# Patient Record
Sex: FEMALE | Race: WHITE | NOT HISPANIC OR LATINO | Employment: FULL TIME | URBAN - METROPOLITAN AREA
[De-identification: names, ages, dates, MRNs, and addresses within clinical notes are randomized per-mention and may not be internally consistent; named-entity substitution may affect disease eponyms.]

---

## 2017-01-05 ENCOUNTER — HOSPITAL ENCOUNTER (INPATIENT)
Facility: CLINIC | Age: 32
LOS: 3 days | Discharge: HOME HEALTH | End: 2017-01-08
Attending: OBSTETRICS & GYNECOLOGY | Admitting: OBSTETRICS & GYNECOLOGY
Payer: COMMERCIAL

## 2017-01-05 ENCOUNTER — TELEPHONE (OUTPATIENT)
Dept: NURSING | Facility: CLINIC | Age: 32
End: 2017-01-05

## 2017-01-05 DIAGNOSIS — Z98.891 S/P PRIMARY LOW TRANSVERSE C-SECTION: Primary | ICD-10-CM

## 2017-01-05 PROBLEM — Z36.89 ENCOUNTER FOR TRIAGE IN PREGNANT PATIENT: Status: ACTIVE | Noted: 2017-01-05

## 2017-01-05 LAB
ERYTHROCYTE [DISTWIDTH] IN BLOOD BY AUTOMATED COUNT: 13.7 % (ref 10–15)
FETAL HGB STAIN: NORMAL
FIBRINOGEN PPP-MCNC: 392 MG/DL (ref 200–420)
HCT VFR BLD AUTO: 37.1 % (ref 35–47)
HGB BLD-MCNC: 12.3 G/DL (ref 11.7–15.7)
HGB F BLD QL KLEIH BETKE: NORMAL
INR PPP: 0.91 (ref 0.86–1.14)
MCH RBC QN AUTO: 30.1 PG (ref 26.5–33)
MCHC RBC AUTO-ENTMCNC: 33.2 G/DL (ref 31.5–36.5)
MCV RBC AUTO: 91 FL (ref 78–100)
PLATELET # BLD AUTO: 126 10E9/L (ref 150–450)
RBC # BLD AUTO: 4.08 10E12/L (ref 3.8–5.2)
WBC # BLD AUTO: 8.5 10E9/L (ref 4–11)

## 2017-01-05 PROCEDURE — 86900 BLOOD TYPING SEROLOGIC ABO: CPT | Performed by: OBSTETRICS & GYNECOLOGY

## 2017-01-05 PROCEDURE — 85460 HEMOGLOBIN FETAL: CPT | Performed by: OBSTETRICS & GYNECOLOGY

## 2017-01-05 PROCEDURE — 86850 RBC ANTIBODY SCREEN: CPT | Performed by: OBSTETRICS & GYNECOLOGY

## 2017-01-05 PROCEDURE — 86923 COMPATIBILITY TEST ELECTRIC: CPT | Performed by: OBSTETRICS & GYNECOLOGY

## 2017-01-05 PROCEDURE — 25000125 ZZHC RX 250: Performed by: OBSTETRICS & GYNECOLOGY

## 2017-01-05 PROCEDURE — 36415 COLL VENOUS BLD VENIPUNCTURE: CPT | Performed by: OBSTETRICS & GYNECOLOGY

## 2017-01-05 PROCEDURE — 85384 FIBRINOGEN ACTIVITY: CPT | Performed by: OBSTETRICS & GYNECOLOGY

## 2017-01-05 PROCEDURE — 86901 BLOOD TYPING SEROLOGIC RH(D): CPT | Performed by: OBSTETRICS & GYNECOLOGY

## 2017-01-05 PROCEDURE — 25800025 ZZH RX 258: Performed by: OBSTETRICS & GYNECOLOGY

## 2017-01-05 PROCEDURE — 85610 PROTHROMBIN TIME: CPT | Performed by: OBSTETRICS & GYNECOLOGY

## 2017-01-05 PROCEDURE — 85027 COMPLETE CBC AUTOMATED: CPT | Performed by: OBSTETRICS & GYNECOLOGY

## 2017-01-05 PROCEDURE — 12000028 ZZH R&B OB UMMC

## 2017-01-05 RX ORDER — NUTRITIONAL TX FOR PKU NO.31 10G-42/13G
2 POWDER (GRAM) ORAL
Status: DISCONTINUED | OUTPATIENT
Start: 2017-01-05 | End: 2017-01-06

## 2017-01-05 RX ORDER — PENICILLIN G POTASSIUM 5000000 [IU]/1
5 INJECTION, POWDER, FOR SOLUTION INTRAMUSCULAR; INTRAVENOUS ONCE
Status: COMPLETED | OUTPATIENT
Start: 2017-01-05 | End: 2017-01-05

## 2017-01-05 RX ORDER — LIDOCAINE 40 MG/G
CREAM TOPICAL
Status: DISCONTINUED | OUTPATIENT
Start: 2017-01-05 | End: 2017-01-06

## 2017-01-05 RX ORDER — OXYTOCIN 10 [USP'U]/ML
10 INJECTION, SOLUTION INTRAMUSCULAR; INTRAVENOUS
Status: DISCONTINUED | OUTPATIENT
Start: 2017-01-05 | End: 2017-01-06

## 2017-01-05 RX ORDER — NALOXONE HYDROCHLORIDE 0.4 MG/ML
.1-.4 INJECTION, SOLUTION INTRAMUSCULAR; INTRAVENOUS; SUBCUTANEOUS
Status: DISCONTINUED | OUTPATIENT
Start: 2017-01-05 | End: 2017-01-06

## 2017-01-05 RX ORDER — IBUPROFEN 800 MG/1
800 TABLET, FILM COATED ORAL
Status: DISCONTINUED | OUTPATIENT
Start: 2017-01-05 | End: 2017-01-06

## 2017-01-05 RX ORDER — METHYLERGONOVINE MALEATE 0.2 MG/ML
200 INJECTION INTRAVENOUS
Status: DISCONTINUED | OUTPATIENT
Start: 2017-01-05 | End: 2017-01-06

## 2017-01-05 RX ORDER — SODIUM CHLORIDE, SODIUM LACTATE, POTASSIUM CHLORIDE, CALCIUM CHLORIDE 600; 310; 30; 20 MG/100ML; MG/100ML; MG/100ML; MG/100ML
INJECTION, SOLUTION INTRAVENOUS CONTINUOUS
Status: DISCONTINUED | OUTPATIENT
Start: 2017-01-05 | End: 2017-01-06

## 2017-01-05 RX ORDER — OXYTOCIN/0.9 % SODIUM CHLORIDE 30/500 ML
100-340 PLASTIC BAG, INJECTION (ML) INTRAVENOUS CONTINUOUS PRN
Status: DISCONTINUED | OUTPATIENT
Start: 2017-01-05 | End: 2017-01-06

## 2017-01-05 RX ORDER — OXYTOCIN/0.9 % SODIUM CHLORIDE 30/500 ML
1-24 PLASTIC BAG, INJECTION (ML) INTRAVENOUS CONTINUOUS
Status: DISCONTINUED | OUTPATIENT
Start: 2017-01-05 | End: 2017-01-06

## 2017-01-05 RX ORDER — FENTANYL CITRATE 50 UG/ML
50-100 INJECTION, SOLUTION INTRAMUSCULAR; INTRAVENOUS
Status: DISCONTINUED | OUTPATIENT
Start: 2017-01-05 | End: 2017-01-06

## 2017-01-05 RX ORDER — CARBOPROST TROMETHAMINE 250 UG/ML
250 INJECTION, SOLUTION INTRAMUSCULAR
Status: DISCONTINUED | OUTPATIENT
Start: 2017-01-05 | End: 2017-01-06

## 2017-01-05 RX ORDER — ACETAMINOPHEN 325 MG/1
650 TABLET ORAL EVERY 4 HOURS PRN
Status: DISCONTINUED | OUTPATIENT
Start: 2017-01-05 | End: 2017-01-06

## 2017-01-05 RX ORDER — OXYCODONE AND ACETAMINOPHEN 5; 325 MG/1; MG/1
1 TABLET ORAL
Status: DISCONTINUED | OUTPATIENT
Start: 2017-01-05 | End: 2017-01-06

## 2017-01-05 RX ORDER — ONDANSETRON 2 MG/ML
4 INJECTION INTRAMUSCULAR; INTRAVENOUS EVERY 6 HOURS PRN
Status: DISCONTINUED | OUTPATIENT
Start: 2017-01-05 | End: 2017-01-06

## 2017-01-05 RX ADMIN — OXYTOCIN-SODIUM CHLORIDE 0.9% IV SOLN 30 UNIT/500ML 2 MILLI-UNITS/MIN: 30-0.9/5 SOLUTION at 11:31

## 2017-01-05 RX ADMIN — PENICILLIN G POTASSIUM 5 MILLION UNITS: 5000000 POWDER, FOR SOLUTION INTRAMUSCULAR; INTRAPLEURAL; INTRATHECAL; INTRAVENOUS at 06:30

## 2017-01-05 RX ADMIN — Medication 2.5 MILLION UNITS: at 10:34

## 2017-01-05 RX ADMIN — Medication 2.5 MILLION UNITS: at 23:31

## 2017-01-05 RX ADMIN — SODIUM CHLORIDE, POTASSIUM CHLORIDE, SODIUM LACTATE AND CALCIUM CHLORIDE: 600; 310; 30; 20 INJECTION, SOLUTION INTRAVENOUS at 22:56

## 2017-01-05 RX ADMIN — Medication 2.5 MILLION UNITS: at 18:34

## 2017-01-05 RX ADMIN — SODIUM CHLORIDE, POTASSIUM CHLORIDE, SODIUM LACTATE AND CALCIUM CHLORIDE: 600; 310; 30; 20 INJECTION, SOLUTION INTRAVENOUS at 13:22

## 2017-01-05 RX ADMIN — Medication 2.5 MILLION UNITS: at 14:31

## 2017-01-05 RX ADMIN — SODIUM CHLORIDE, POTASSIUM CHLORIDE, SODIUM LACTATE AND CALCIUM CHLORIDE: 600; 310; 30; 20 INJECTION, SOLUTION INTRAVENOUS at 06:30

## 2017-01-05 NOTE — H&P
"[facility]  History and Physical    CC: Labor admission    HPI: Laila Cuvea is a 31 year old  who presents at 39w4d by 8w4d the US in a pregnancy complicated by PKU and, possible von Willebrand disease (See below for details).     She is feeling well today. She presents with two day history of vaginal bleeding that has shown up as a 50 cent piece size of blood on a pantyliner. She feels some contractions. Notes thin, watery discharge. Denies bit gush of fluid.    Complications of pregnancy:  1. PKU - levels WNL at 2-4 mg/dL  2. Possible vWD, history of heavy periods, patient with low normal levels in pregnancy, concerning for possible vWD, need to re-check levels at 6-8 weeks pp.  3. Thrombocytopenia - plt of 126, likely gestational thrombocytopenia    US review:  2016: Dating US @8w4d  8/10/2016: Anatomy US @ 18w3d, anterior placenta, normal anatomy, cervix 4.13 cm  2016: F/u for PKU, LUCERO 15, EFW 3834g at 37w3d      Lab review:  Rh +, Antibody Negative, Rubella immune, HIV NR, RPR Neg, ,    OB Hx:  G1: current    Review Of Systems  Eyes: No vision changes  Ears/Nose/Throat: No rhinorrhea, throat soreness  Respiratory: No shortness of breath, dyspnea on exertion, cough, or hemoptysis  Cardiovascular: negative for, chest pain and exertional chest pain or pressure  Gastrointestinal: negative for, nausea, vomiting, constipation and diarrhea  Genitourinary: negative for and dysuria  Musculoskeletal: negative for, back pain and joint pain  Neurologic: negative for headaches  Hematologic/Lymphatic/Immunologic: negative for, anemia and bleeding disorder  Endocrine: negative for, thyroid disorder and diabetes    PMH:  PKU    PSH:   [place] teeth, toenails    Social Hx:  Lives with  sidra    Meds:   [place], PNV    Allergies:  Splenda    Physical Exam:  /88 mmHg  Temp(Src) 98.3  F (36.8  C) (Oral)  Resp 16  Ht 1.609 m (5' 3.35\")  Wt 76 kg (167 lb 8.8 oz)  BMI " 29.36 kg/m2  LMP 04/10/2016 (Approximate)   Constitutional: healthy, alert and no distress  Head: Normocephalic. No masses, lesions, tenderness or abnormalities  Cardiovascular: negative, RRR. No murmurs, clicks gallops or rub  Respiratory: negative. Good diaphragmatic excursion. Lungs clear  Gastrointestinal: Abdomen soft, non-tender. Gravid  : Pelvic - EG: normal female, vulva reveals no erythema or lesions.   BUS: within normal limits.  Vagina: well rugated, no lesions polyps. +Pooling, thin fluid, with brown discharge.  Cervix: no lesions, polyps discharge or CMT.  Musculoskeletal: extremities normal- no gross deformities noted and normal muscle tone  Psychiatric: mentation appears normal and affect normal/bright    Results for orders placed or performed during the hospital encounter of 17   CBC with platelets   Result Value Ref Range    WBC 8.5 4.0 - 11.0 10e9/L    RBC Count 4.08 3.8 - 5.2 10e12/L    Hemoglobin 12.3 11.7 - 15.7 g/dL    Hematocrit 37.1 35.0 - 47.0 %    MCV 91 78 - 100 fl    MCH 30.1 26.5 - 33.0 pg    MCHC 33.2 31.5 - 36.5 g/dL    RDW 13.7 10.0 - 15.0 %    Platelet Count 126 (L) 150 - 450 10e9/L   Fibrinogen activity   Result Value Ref Range    Fibrinogen 392 200 - 420 mg/dL   INR   Result Value Ref Range    INR 0.91 0.86 - 1.14   ABO and Rh   Result Value Ref Range    ABO Pending     RH(D) Pending     Specimen Expires 2017      BSUS: Cephalic, LUCERO 4    FHT: Baseline 145 Bpm, moderat variability, + accels, single, prolonged decel  [place]: ctx q2 min      A/P:  31 year old  at 39w4d here with bleeding and likely PROM.    1. PROM:  - currently valarie too frequently to augment, will monitor  - GBS +, start PCN now  - Cephalic BSUS    2. Frequent contractions, r/u abruption:  - fibrinogen low normal at 329, will watch for KB and continue close monitoring, FHT overall reassuring    3. PKU  - continue Kuvan    4. Thrombocytopenia, if patient is having abruption, could be  consumptive, but otherwise, likely gestational  - cbc PP    5. Possible vWD, avoid operative delivery  - vWD testing at 6-8 weeks pp.    Patient Discussed with Dr. Carver    Anticipated stay >2 days    Beverly Harvey MD 5:14 AM 1/5/2017

## 2017-01-05 NOTE — PLAN OF CARE
Problem: Labor (Cervical Ripen, Induct, Augment) (Adult,Obstetrics,Pediatric)  Goal: Signs and Symptoms of Listed Potential Problems Will be Absent or Manageable (Labor)  Signs and symptoms of listed potential problems will be absent or manageable by discharge/transition of care (reference Labor (Cervical Ripen, Induct, Augment) (Adult,Obstetrics,Pediatric) CPG).  Outcome: Improving  Data: [facility]   Reason for maternal/fetal assessment per patient is Vaginal Bleeding  .  Patient is a . Prenatal record reviewed.      Obstetric History       T0      TAB0   SAB0   E0   M0   L0        # Outcome Date GA Lbr Tarik/2nd Weight Sex Delivery Anes PTL Lv   1 Current                         . Medical history:   Past Medical History   Diagnosis Date     Atypical PKU (H)         since birth, manage with diet      Von Willebrand disease (H)         affects mainly menses     Raynaud phenomenon         affects fingers and toes     Von Willebrand disease (H)     . Gestational Age 39w4d. VSS. Fetal movement present. Patient denies cramping, backache, vaginal discharge, pelvic pressure, UTI symptoms, GI problems, edema, headache, visual disturbances, epigastric or URQ pain, abdominal pain, rupture of membranes. Support persons Tray present.  Action: Verbal consent for EFM. Triage assessment completed. EFM applied for labor eval. Uterine assessment frequent contractions every 2 minutes. Fetal assessment: Presumed adequate fetal oxygenation documented (see flow record).   Response: Dr. Carver informed of arrival and complaints. Plan per provider is admit for low juan/srom. Patient verbalized agreement with plan. Patient transferred to room 481 ambulatory, oriented to room and call light. Begin antibiotics for gbs+.

## 2017-01-05 NOTE — IP AVS SNAPSHOT
[facility]    [address] [address]    Phone:  [phone]                                       After Visit Summary   1/5/2017    Laila Cueva    MRN: [id number]           After Visit Summary Signature Page     I have received my discharge instructions, and my questions have been answered. I have discussed any challenges I see with this plan with the nurse or doctor.    ..........................................................................................................................................  Patient/Patient Representative Signature      ..........................................................................................................................................  Patient Representative Print Name and Relationship to Patient    ..................................................               ................................................  Date                                            Time    ..........................................................................................................................................  Reviewed by Signature/Title    ...................................................              ..............................................  Date                                                            Time

## 2017-01-05 NOTE — IP AVS SNAPSHOT
MRN:[id number]                      After Visit Summary   2017    Laila Cueva    MRN: [id number]           Thank you!     Thank you for choosing [facility] for your care. Our goal is always to provide you with excellent care. Hearing back from our patients is one way we can continue to improve our services. Please take a few minutes to complete the written survey that you may receive in the mail after you visit with us. Thank you!        Patient Information     Date Of Birth          1985        About your hospital stay     You were [facility] on:  [facility]    You were discharged on:  2017       Who to Call     For medical emergencies, please call 911.  For non-urgent questions about your medical care, please call your primary care provider or clinic, [phone]  For questions related to your surgery, please call your surgery clinic        Attending Provider     Provider    Kelly Carver MD Eckhardt, Elizabeth A, MD       Primary Care Provider Office Phone # Fax #    Billie Phillips JEREMY Mathe[facility] [phone]       [address] [address]        Your next 10 appointments already scheduled     Mar 27, 2017 12:30 PM   Return Metabolic Visit with Bishop Monroy MD   Peds Metabolism ([facility])    [facility]  [address] [address]  [address] [place] [id number] [phone]              Further instructions from your care team       Postop  Birth Instructions    Activity       Do not lift more than 10 pounds for 6 weeks after surgery.  Ask family and friends for help when you need it.    No driving until you have stopped taking your pain medications (usually two weeks after surgery).    No heavy exercise or activity for 6 weeks.  Don't do anything that will put a strain on your surgery site.    Don't strain when using the toilet.   Your care team may prescribe a stool softener if you have problems with your bowel movements.     To care for your incision:       Keep the incision clean and dry.    Do not soak your incision in water. No swimming or hot tubs until it has fully healed. You may soak in the bathtub if the water level is below your incision.    Do not use peroxide, gel, cream, lotion, or ointment on your incision.    Adjust your clothes to avoid pressure on your surgery site (check the elastic in your underwear for example).     You may see a small amount of clear or pink drainage and this is normal.  Check with your health care provider:       If the drainage increases or has an odor.    If the incision reddens, you have swelling, or develop a rash.    If you have increased pain and the medicine we prescribed doesn't help.    If you have a fever above 100.4 F (38 C) with or without chills when placing thermometer under your tongue.   The area around your incision (surgery wound), will feel numb.  This is normal. The numbness should go away in less than a year.     Keep your hands clean:  Always wash your hands before touching your incision (surgery wound). This helps reduce your risk of infection. If your hands aren't dirty, you may use an alcohol hand-rub to clean your hands. Keep your nails clean and short.    Call your healthcare provider if you have any of these symptoms:       You soak a sanitary pad with blood within 1 hour, or you see blood clots larger than a golf ball.    Bleeding that lasts more than 6 weeks.    Vaginal discharge that smells bad.    Severe pain, cramping or tenderness in your lower belly area.    A need to urinate more frequently (use the toilet more often), more urgently (use the toilet very quickly), or it burns when you urinate.    Nausea and vomiting.    Redness, swelling or pain around a vein in your leg.    Problems breastfeeding or a red or painful area on your breast.    Chest pain and cough or are  "gasping for air.    Problems with coping with sadness, anxiety or depression. If you have concerns about hurting yourself or the baby, call your provider immediately.      You have questions or concerns after you return home.                  Pending Results     No orders found from 2017 to 2017.            Statement of Approval     Ordered          17 0905  I have reviewed and agree with all the recommendations and orders detailed in this document.   EFFECTIVE NOW     Approved and electronically signed by:  Erika De La Paz MD             Admission Information        Provider Department Dept Phone    2017 JUAN C CABALLERO MD [facility] [phone]      Your Vitals Were     Blood Pressure Pulse Temperature Respirations    135/81 mmHg 75 98.6  F (37  C) (Oral) 16    Height Weight BMI (Body Mass Index) Pulse Oximetry    1.609 m (5' 3.35\") 76 kg (167 lb 8.8 oz) 29.36 kg/m2 100%    Last Period             04/10/2016 (Approximate)         MyChart Information     [facility] lets you send messages to your doctor, view your test results, renew your prescriptions, schedule appointments and more. To sign up, go to [email or web address] . Click on \"Log in\" on the left side of the screen, which will take you to the Welcome page. Then click on \"Sign up Now\" on the right side of the page.     You will be asked to enter the access code listed below, as well as some personal information. Please follow the directions to create your username and password.     Your access code is: [email or web address]  Expires: 3/28/2017  4:02 PM     Your access code will  in 90 days. If you need help or a new code, please call your [facility] clinic or [phone].        Care EveryWhere ID     This is your Care EveryWhere ID. This could be used by other organizations to access your [facility] medical records  [id number]           Review of your medicines      UNREVIEWED medicines. Ask your doctor about these medicines        Dose / " Directions    PHENYLADE ESSENTIAL DRINK MIX Pack   Used for:  Classical phenylketonuria (H)        Dose:  2 Package   Take 2 Packages by mouth daily   Quantity:  62 packet   Refills:  3         START taking        Dose / Directions    ferrous sulfate 325 (65 FE) MG tablet   Commonly known as:  IRON   Used for:  S/P primary low transverse         Dose:  325 mg   Take 1 tablet (325 mg) by mouth daily (with breakfast)   Quantity:  60 tablet   Refills:  0       ibuprofen 600 MG tablet   Commonly known as:  ADVIL/MOTRIN   Used for:  S/P primary low transverse         Dose:  600 mg   Take 1 tablet (600 mg) by mouth every 6 hours as needed for moderate pain   Quantity:  60 tablet   Refills:  1       oxyCODONE-acetaminophen 5-325 MG per tablet   Commonly known as:  PERCOCET   Used for:  S/P primary low transverse         Dose:  1-2 tablet   Take 1-2 tablets by mouth every 4 hours as needed for moderate to severe pain   Quantity:  30 tablet   Refills:  0       senna-docusate 8.6-50 MG per tablet   Commonly known as:  SENOKOT-S;PERICOLACE   Used for:  S/P primary low transverse         Dose:  1-2 tablet   Take 1-2 tablets by mouth 2 times daily as needed for constipation   Quantity:  100 tablet   Refills:  0         CONTINUE these medicines which have NOT CHANGED        Dose / Directions    prenatal multivitamin  plus iron 27-0.8 MG Tabs per tablet   Indication:  Pregnancy        Dose:  1 tablet   Take 1 tablet by mouth daily   Refills:  0       sapropterin dihydrochloride 100 MG Tbso   Commonly known as:  KUVAN   Used for:  Phenylketonuria (PKU) (H)        Take 12 tablets by mouth once daily with food for a total daily dose of 1200 mg. Take each dose with a meal.   Quantity:  360 tablet   Refills:  11            Where to get your medicines      These medications were sent to [facility] [place], [place] - [address]  [address] [address]      Phone:  [phone]    - ferrous sulfate 325 (65 FE) MG tablet  - ibuprofen 600 MG tablet  - senna-docusate 8.6-50 MG per tablet      Some of these will need a paper prescription and others can be bought over the counter. Ask your nurse if you have questions.     Bring a paper prescription for each of these medications    - oxyCODONE-acetaminophen 5-325 MG per tablet             Protect others around you: Learn how to safely use, store and throw away your medicines at www.disposemymeds.org.             Medication List: This is a list of all your medications and when to take them. Check marks below indicate your daily home schedule. Keep this list as a reference.      Medications           Morning Afternoon Evening Bedtime As Needed    ferrous sulfate 325 (65 FE) MG tablet   Commonly known as:  IRON   Take 1 tablet (325 mg) by mouth daily (with breakfast)                                ibuprofen 600 MG tablet   Commonly known as:  ADVIL/MOTRIN   Take 1 tablet (600 mg) by mouth every 6 hours as needed for moderate pain   Last time this was given:  800 mg on 1/8/2017  6:38 AM                                oxyCODONE-acetaminophen 5-325 MG per tablet   Commonly known as:  PERCOCET   Take 1-2 tablets by mouth every 4 hours as needed for moderate to severe pain                                PHENYLADE ESSENTIAL DRINK MIX Pack   Take 2 Packages by mouth daily                                prenatal multivitamin  plus iron 27-0.8 MG Tabs per tablet   Take 1 tablet by mouth daily                                sapropterin dihydrochloride 100 MG Tbso   Commonly known as:  KUVAN   Take 12 tablets by mouth once daily with food for a total daily dose of 1200 mg. Take each dose with a meal.   Last time this was given:  1,200 mg on 1/7/2017 12:59 PM                                senna-docusate 8.6-50 MG per tablet   Commonly known as:  SENOKOT-S;PERICOLACE   Take 1-2 tablets by mouth 2 times daily as needed for constipation    Last time this was given:  2 tablets on 1/8/2017  8:15 AM

## 2017-01-05 NOTE — TELEPHONE ENCOUNTER
Call Type: Triage Call    Presenting Problem: 39.5 weeks pregnant, noticed some vaginal  bleeding starting two days ago, has been gradually increasing.  Continued fetal movement.  Triage Note:  Guideline Title: Pregnancy: Vaginal Bleeding, More Than 20 Weeks  Recommended Disposition: See ED Immediately  Original Inclination: Wanted to speak with a nurse  Override Disposition:  Intended Action: Go to Hospital / ED  Physician Contacted: No  Vaginal bleeding more than bloody show ?  YES  Regular contractions less than 5 minutes apart ? NO  Vaginal bleeding AND less than 20 weeks gestation ? NO  Continuous hardening (non-relaxing) of uterus or abdomen, regardless of presence  or severity of pain ? NO  New or worsening signs and symptoms that may indicate shock ? NO  Feeling of baby coming or wanting to push (urge to bear down) ? NO  Unbearable abdominal/pelvic pain ? NO  Umbilical cord or any part of the baby (head, bottom, arm or leg) at the opening  of the vagina ? NO  Continuous bright red vaginal bleeding for more than 15 minutes (more than  spotting) ? NO  Profuse vaginal bleeding not contained by pads ? NO  Physician Instructions:  Care Advice: Call  if any of these occur: profuse bright red vaginal  bleeding  continuous (without relaxation) abdominal pain  the umbilical cord or any fetal part in vagina  bag of jean coming through vagina  feeling of wanting to push or have a bowel movement.

## 2017-01-05 NOTE — PROGRESS NOTES
"Subjective:   Contractions: very comfortable  Leakage of fluids: ?blood vs fluid        Objective:  Patient Vitals for the past 8 hrs:   BP Temp Temp src Pulse Resp Height Weight   01/05/17 0740 - (!) 87.7  F (30.9  C) Oral - 18 - -   01/05/17 0545 139/84 mmHg 98.3  F (36.8  C) - 67 16 - -   01/05/17 0345 131/88 mmHg - - - - - -   01/05/17 0335 (!) 137/96 mmHg 98  F (36.7  C) Oral - 18 1.609 m (5' 3.35\") 76 kg (167 lb 8.8 oz)     Cervix: not checked, had been closed and long  Membranes: SROM ?2 am today    EFM:   130 baseline, moderate variablility, + accels, no decels, Category I  Tocometer: external monitor and frequency q 2-7 minutes    Assessment:/Plan:   Labor: will be augmented with Pitocin  On PCN for GBS+ and presumed PROM since LUCERO was 15 and now is 4 cm.  Discussed >39 wks and better to get baby out when not sure than wait.  Pitocin discussed and questions answered. She is hoping to avoid epidural.    JUAN C CABALLERO      "

## 2017-01-05 NOTE — PROGRESS NOTES
[facility]  Labor Progress Note    S: FHT strip review    O:   Patient Vitals for the past 4 hrs:   BP Temp Temp src Resp   17 1425 - 97.8  F (36.6  C) Axillary -   17 1207 114/70 mmHg 97.9  F (36.6  C) Oral 16       FHT: Baseline 135, moderate variability, + accelerations, no decelerations  [place]: Every 2-5 minutes     A/P:  Ms. Laila Cueva is a 31 year old  at 39w4d by 8w4d US, here with presumed PROM given decreased LUCERO and pooling.    Labor: - Will augment with pitocin when contractions consistently space   - Pain: PRN per patient wishes.    - Anticipate .  Avoid operative delivery d/t possible vWD and AC>99%ile  FWB: - Category 1 FHT, reactive  PNC: - Rh positive, Rubella immune, GBS pos- PCN @ 0630, , Placenta anterior    Марина Louise Campos MD   OBGYN, PGY-3  2017 3:02 PM

## 2017-01-05 NOTE — PLAN OF CARE
Problem: Labor (Cervical Ripen, Induct, Augment) (Adult,Obstetrics,Pediatric)  Goal: Signs and Symptoms of Listed Potential Problems Will be Absent or Manageable (Labor)  Signs and symptoms of listed potential problems will be absent or manageable by discharge/transition of care (reference Labor (Cervical Ripen, Induct, Augment) (Adult,Obstetrics,Pediatric) CPG).   Pt feeling more contractions, ambulating in halls, and using the birthing ball. On 3 milliunits of Pitocin.  Continue present cares.

## 2017-01-05 NOTE — PROGRESS NOTES
[facility]  Labor Progress Note    S:Patient is feeling some cramping.    O:   Patient Vitals for the past 4 hrs:   BP Temp Temp src Resp   17 1036 115/75 mmHg 98.2  F (36.8  C) Oral 16     SVE: Deferred    FHT: Baseline 135, moderate variability, + accelerations, no decelerations  [place]: Every 2 minute contractions, uterine irritability    A/P:  Ms. Laila Ceuva is a 31 year old  at 39w4d by 8w4d US, here with presumed PROM given decreased LUCERO and pooling.    Labor: - Expectant management at this time, as patient continues to contract too frequently.  Will start IV pitocin as able.  Patient agrees to plan.   - Pain: PRN per patient wishes.    - Anticipate .  Avoid operative delivery d/t possible vWD.   FWB: - Category 1 FHT, reactive  PNC: - Rh positive, Rubella immune, GBS pos- PCN @ 0630, , Placenta anterior    Laxmi Ngo MD  Ob/Gyn, PGY-2  2017, 11:50 AM

## 2017-01-05 NOTE — PLAN OF CARE
Problem: Labor (Cervical Ripen, Induct, Augment) (Adult,Obstetrics,Pediatric)  Goal: Signs and Symptoms of Listed Potential Problems Will be Absent or Manageable (Labor)  Signs and symptoms of listed potential problems will be absent or manageable by discharge/transition of care (reference Labor (Cervical Ripen, Induct, Augment) (Adult,Obstetrics,Pediatric) CPG).   Outcome: Therapy, progress toward functional goals as expected  Pt feeling more crampy with her contractions

## 2017-01-06 ENCOUNTER — ANESTHESIA (OUTPATIENT)
Dept: OBGYN | Facility: CLINIC | Age: 32
End: 2017-01-06
Payer: COMMERCIAL

## 2017-01-06 ENCOUNTER — ANESTHESIA EVENT (OUTPATIENT)
Dept: OBGYN | Facility: CLINIC | Age: 32
End: 2017-01-06
Payer: COMMERCIAL

## 2017-01-06 PROBLEM — Z98.891 S/P PRIMARY LOW TRANSVERSE C-SECTION: Status: ACTIVE | Noted: 2017-01-06

## 2017-01-06 LAB
ABO + RH BLD: NORMAL
BLD GP AB SCN SERPL QL: NORMAL
BLD GP AB SCN SERPL QL: NORMAL
BLD PROD TYP BPU: NORMAL
BLOOD BANK CMNT PATIENT-IMP: NORMAL
NUM BPU REQUESTED: 1
SPECIMEN EXP DATE BLD: NORMAL

## 2017-01-06 PROCEDURE — 25000132 ZZH RX MED GY IP 250 OP 250 PS 637

## 2017-01-06 PROCEDURE — 25800025 ZZH RX 258: Performed by: STUDENT IN AN ORGANIZED HEALTH CARE EDUCATION/TRAINING PROGRAM

## 2017-01-06 PROCEDURE — 27210794 ZZH OR GENERAL SUPPLY STERILE: Performed by: OBSTETRICS & GYNECOLOGY

## 2017-01-06 PROCEDURE — 36000057 ZZH SURGERY LEVEL 3 1ST 30 MIN - UMMC: Performed by: OBSTETRICS & GYNECOLOGY

## 2017-01-06 PROCEDURE — 12000028 ZZH R&B OB UMMC

## 2017-01-06 PROCEDURE — 25000132 ZZH RX MED GY IP 250 OP 250 PS 637: Performed by: OBSTETRICS & GYNECOLOGY

## 2017-01-06 PROCEDURE — 40000977 ZZH STATISTIC ATTENDANCE AT DELIVERY

## 2017-01-06 PROCEDURE — 25000125 ZZHC RX 250: Performed by: ANESTHESIOLOGY

## 2017-01-06 PROCEDURE — 37000009 ZZH ANESTHESIA TECHNICAL FEE, EACH ADDTL 15 MIN: Performed by: OBSTETRICS & GYNECOLOGY

## 2017-01-06 PROCEDURE — 27110028 ZZH OR GENERAL SUPPLY NON-STERILE: Performed by: OBSTETRICS & GYNECOLOGY

## 2017-01-06 PROCEDURE — 71000015 ZZH RECOVERY PHASE 1 LEVEL 2 EA ADDTL HR: Performed by: OBSTETRICS & GYNECOLOGY

## 2017-01-06 PROCEDURE — 37000008 ZZH ANESTHESIA TECHNICAL FEE, 1ST 30 MIN: Performed by: OBSTETRICS & GYNECOLOGY

## 2017-01-06 PROCEDURE — 25000125 ZZHC RX 250: Performed by: STUDENT IN AN ORGANIZED HEALTH CARE EDUCATION/TRAINING PROGRAM

## 2017-01-06 PROCEDURE — 25000125 ZZHC RX 250: Performed by: OBSTETRICS & GYNECOLOGY

## 2017-01-06 PROCEDURE — 59510 CESAREAN DELIVERY: CPT | Mod: GC | Performed by: OBSTETRICS & GYNECOLOGY

## 2017-01-06 PROCEDURE — 71000014 ZZH RECOVERY PHASE 1 LEVEL 2 FIRST HR: Performed by: OBSTETRICS & GYNECOLOGY

## 2017-01-06 PROCEDURE — 40000170 ZZH STATISTIC PRE-PROCEDURE ASSESSMENT II: Performed by: OBSTETRICS & GYNECOLOGY

## 2017-01-06 PROCEDURE — 36000059 ZZH SURGERY LEVEL 3 EA 15 ADDTL MIN UMMC: Performed by: OBSTETRICS & GYNECOLOGY

## 2017-01-06 PROCEDURE — 25000134 H RX MED IP 250 OP 636 PS 250: Performed by: STUDENT IN AN ORGANIZED HEALTH CARE EDUCATION/TRAINING PROGRAM

## 2017-01-06 PROCEDURE — C1765 ADHESION BARRIER: HCPCS | Performed by: OBSTETRICS & GYNECOLOGY

## 2017-01-06 PROCEDURE — 25000125 ZZHC RX 250

## 2017-01-06 PROCEDURE — 25800025 ZZH RX 258: Performed by: OBSTETRICS & GYNECOLOGY

## 2017-01-06 RX ORDER — NALOXONE HYDROCHLORIDE 0.4 MG/ML
.1-.4 INJECTION, SOLUTION INTRAMUSCULAR; INTRAVENOUS; SUBCUTANEOUS
Status: DISCONTINUED | OUTPATIENT
Start: 2017-01-06 | End: 2017-01-08 | Stop reason: HOSPADM

## 2017-01-06 RX ORDER — HYDROMORPHONE HYDROCHLORIDE 1 MG/ML
.3-.5 INJECTION, SOLUTION INTRAMUSCULAR; INTRAVENOUS; SUBCUTANEOUS EVERY 5 MIN PRN
Status: DISCONTINUED | OUTPATIENT
Start: 2017-01-06 | End: 2017-01-06 | Stop reason: HOSPADM

## 2017-01-06 RX ORDER — LIDOCAINE HYDROCHLORIDE AND EPINEPHRINE 15; 5 MG/ML; UG/ML
INJECTION, SOLUTION EPIDURAL PRN
Status: DISCONTINUED | OUTPATIENT
Start: 2017-01-06 | End: 2018-12-11 | Stop reason: HOSPADM

## 2017-01-06 RX ORDER — LANOLIN 100 %
OINTMENT (GRAM) TOPICAL
Status: DISCONTINUED | OUTPATIENT
Start: 2017-01-06 | End: 2017-01-08 | Stop reason: HOSPADM

## 2017-01-06 RX ORDER — LIDOCAINE HCL/EPINEPHRINE/PF 2%-1:200K
VIAL (ML) INJECTION PRN
Status: DISCONTINUED | OUTPATIENT
Start: 2017-01-06 | End: 2017-01-06

## 2017-01-06 RX ORDER — DIPHENHYDRAMINE HCL 25 MG
25 CAPSULE ORAL EVERY 6 HOURS PRN
Status: DISCONTINUED | OUTPATIENT
Start: 2017-01-06 | End: 2017-01-08 | Stop reason: HOSPADM

## 2017-01-06 RX ORDER — SIMETHICONE 80 MG
80 TABLET,CHEWABLE ORAL 4 TIMES DAILY PRN
Status: DISCONTINUED | OUTPATIENT
Start: 2017-01-06 | End: 2017-01-08 | Stop reason: HOSPADM

## 2017-01-06 RX ORDER — ONDANSETRON 2 MG/ML
INJECTION INTRAMUSCULAR; INTRAVENOUS PRN
Status: DISCONTINUED | OUTPATIENT
Start: 2017-01-06 | End: 2017-01-06

## 2017-01-06 RX ORDER — SODIUM CHLORIDE, SODIUM LACTATE, POTASSIUM CHLORIDE, CALCIUM CHLORIDE 600; 310; 30; 20 MG/100ML; MG/100ML; MG/100ML; MG/100ML
INJECTION, SOLUTION INTRAVENOUS CONTINUOUS PRN
Status: DISCONTINUED | OUTPATIENT
Start: 2017-01-06 | End: 2017-01-06

## 2017-01-06 RX ORDER — CEFAZOLIN SODIUM 1 G/3ML
INJECTION, POWDER, FOR SOLUTION INTRAMUSCULAR; INTRAVENOUS PRN
Status: DISCONTINUED | OUTPATIENT
Start: 2017-01-06 | End: 2017-01-06

## 2017-01-06 RX ORDER — NALOXONE HYDROCHLORIDE 0.4 MG/ML
.1-.4 INJECTION, SOLUTION INTRAMUSCULAR; INTRAVENOUS; SUBCUTANEOUS
Status: DISCONTINUED | OUTPATIENT
Start: 2017-01-06 | End: 2017-01-06

## 2017-01-06 RX ORDER — AMOXICILLIN 250 MG
1-2 CAPSULE ORAL 2 TIMES DAILY
Status: DISCONTINUED | OUTPATIENT
Start: 2017-01-06 | End: 2017-01-08 | Stop reason: HOSPADM

## 2017-01-06 RX ORDER — OXYCODONE HYDROCHLORIDE 5 MG/1
5-10 TABLET ORAL
Status: DISCONTINUED | OUTPATIENT
Start: 2017-01-06 | End: 2017-01-08 | Stop reason: HOSPADM

## 2017-01-06 RX ORDER — NALBUPHINE HYDROCHLORIDE 10 MG/ML
2.5-5 INJECTION, SOLUTION INTRAMUSCULAR; INTRAVENOUS; SUBCUTANEOUS EVERY 6 HOURS PRN
Status: DISCONTINUED | OUTPATIENT
Start: 2017-01-06 | End: 2017-01-06

## 2017-01-06 RX ORDER — MORPHINE SULFATE 1 MG/ML
INJECTION, SOLUTION EPIDURAL; INTRATHECAL; INTRAVENOUS PRN
Status: DISCONTINUED | OUTPATIENT
Start: 2017-01-06 | End: 2017-01-06

## 2017-01-06 RX ORDER — FENTANYL CITRATE 50 UG/ML
25-50 INJECTION, SOLUTION INTRAMUSCULAR; INTRAVENOUS
Status: DISCONTINUED | OUTPATIENT
Start: 2017-01-06 | End: 2017-01-06 | Stop reason: HOSPADM

## 2017-01-06 RX ORDER — KETOROLAC TROMETHAMINE 30 MG/ML
INJECTION, SOLUTION INTRAMUSCULAR; INTRAVENOUS PRN
Status: DISCONTINUED | OUTPATIENT
Start: 2017-01-06 | End: 2017-01-06

## 2017-01-06 RX ORDER — HYDROCORTISONE 2.5 %
CREAM (GRAM) TOPICAL 3 TIMES DAILY PRN
Status: DISCONTINUED | OUTPATIENT
Start: 2017-01-06 | End: 2017-01-08 | Stop reason: HOSPADM

## 2017-01-06 RX ORDER — DEXTROSE, SODIUM CHLORIDE, SODIUM LACTATE, POTASSIUM CHLORIDE, AND CALCIUM CHLORIDE 5; .6; .31; .03; .02 G/100ML; G/100ML; G/100ML; G/100ML; G/100ML
INJECTION, SOLUTION INTRAVENOUS CONTINUOUS
Status: DISCONTINUED | OUTPATIENT
Start: 2017-01-06 | End: 2017-01-08 | Stop reason: HOSPADM

## 2017-01-06 RX ORDER — DIPHENHYDRAMINE HYDROCHLORIDE 50 MG/ML
25 INJECTION INTRAMUSCULAR; INTRAVENOUS EVERY 6 HOURS PRN
Status: DISCONTINUED | OUTPATIENT
Start: 2017-01-06 | End: 2017-01-08 | Stop reason: HOSPADM

## 2017-01-06 RX ORDER — EPHEDRINE SULFATE 50 MG/ML
INJECTION, SOLUTION INTRAMUSCULAR; INTRAVENOUS; SUBCUTANEOUS
Status: DISCONTINUED
Start: 2017-01-06 | End: 2017-01-06 | Stop reason: HOSPADM

## 2017-01-06 RX ORDER — DIMENHYDRINATE 50 MG/ML
25 INJECTION, SOLUTION INTRAMUSCULAR; INTRAVENOUS
Status: DISCONTINUED | OUTPATIENT
Start: 2017-01-06 | End: 2017-01-06 | Stop reason: HOSPADM

## 2017-01-06 RX ORDER — ACETAMINOPHEN 325 MG/1
650 TABLET ORAL EVERY 4 HOURS PRN
Status: DISCONTINUED | OUTPATIENT
Start: 2017-01-09 | End: 2017-01-08 | Stop reason: HOSPADM

## 2017-01-06 RX ORDER — MISOPROSTOL 200 UG/1
400 TABLET ORAL
Status: DISCONTINUED | OUTPATIENT
Start: 2017-01-06 | End: 2017-01-08 | Stop reason: HOSPADM

## 2017-01-06 RX ORDER — ACETAMINOPHEN 325 MG/1
975 TABLET ORAL EVERY 8 HOURS
Status: DISCONTINUED | OUTPATIENT
Start: 2017-01-06 | End: 2017-01-08 | Stop reason: HOSPADM

## 2017-01-06 RX ORDER — EPHEDRINE SULFATE 50 MG/ML
5 INJECTION, SOLUTION INTRAMUSCULAR; INTRAVENOUS; SUBCUTANEOUS
Status: DISCONTINUED | OUTPATIENT
Start: 2017-01-06 | End: 2017-01-06

## 2017-01-06 RX ORDER — LIDOCAINE 40 MG/G
CREAM TOPICAL
Status: DISCONTINUED | OUTPATIENT
Start: 2017-01-06 | End: 2017-01-06

## 2017-01-06 RX ORDER — IBUPROFEN 400 MG/1
400-800 TABLET, FILM COATED ORAL EVERY 6 HOURS PRN
Status: DISCONTINUED | OUTPATIENT
Start: 2017-01-07 | End: 2017-01-08 | Stop reason: HOSPADM

## 2017-01-06 RX ORDER — CHLOROPROCAINE HYDROCHLORIDE 30 MG/ML
INJECTION, SOLUTION EPIDURAL; INFILTRATION; INTRACAUDAL; PERINEURAL PRN
Status: DISCONTINUED | OUTPATIENT
Start: 2017-01-06 | End: 2017-01-06

## 2017-01-06 RX ORDER — OXYTOCIN/0.9 % SODIUM CHLORIDE 30/500 ML
100 PLASTIC BAG, INJECTION (ML) INTRAVENOUS CONTINUOUS
Status: DISCONTINUED | OUTPATIENT
Start: 2017-01-06 | End: 2017-01-08 | Stop reason: HOSPADM

## 2017-01-06 RX ORDER — ONDANSETRON 2 MG/ML
4 INJECTION INTRAMUSCULAR; INTRAVENOUS EVERY 6 HOURS PRN
Status: DISCONTINUED | OUTPATIENT
Start: 2017-01-06 | End: 2017-01-08 | Stop reason: HOSPADM

## 2017-01-06 RX ORDER — KETOROLAC TROMETHAMINE 30 MG/ML
30 INJECTION, SOLUTION INTRAMUSCULAR; INTRAVENOUS EVERY 6 HOURS
Status: DISPENSED | OUTPATIENT
Start: 2017-01-06 | End: 2017-01-07

## 2017-01-06 RX ORDER — LABETALOL HYDROCHLORIDE 5 MG/ML
10 INJECTION, SOLUTION INTRAVENOUS
Status: DISCONTINUED | OUTPATIENT
Start: 2017-01-06 | End: 2017-01-06 | Stop reason: HOSPADM

## 2017-01-06 RX ORDER — SODIUM CHLORIDE, SODIUM LACTATE, POTASSIUM CHLORIDE, CALCIUM CHLORIDE 600; 310; 30; 20 MG/100ML; MG/100ML; MG/100ML; MG/100ML
INJECTION, SOLUTION INTRAVENOUS CONTINUOUS
Status: DISCONTINUED | OUTPATIENT
Start: 2017-01-06 | End: 2017-01-06 | Stop reason: HOSPADM

## 2017-01-06 RX ORDER — BISACODYL 10 MG
10 SUPPOSITORY, RECTAL RECTAL DAILY PRN
Status: DISCONTINUED | OUTPATIENT
Start: 2017-01-08 | End: 2017-01-08 | Stop reason: HOSPADM

## 2017-01-06 RX ORDER — OXYTOCIN 10 [USP'U]/ML
10 INJECTION, SOLUTION INTRAMUSCULAR; INTRAVENOUS
Status: DISCONTINUED | OUTPATIENT
Start: 2017-01-06 | End: 2017-01-08 | Stop reason: HOSPADM

## 2017-01-06 RX ORDER — OXYTOCIN/0.9 % SODIUM CHLORIDE 30/500 ML
340 PLASTIC BAG, INJECTION (ML) INTRAVENOUS CONTINUOUS PRN
Status: DISCONTINUED | OUTPATIENT
Start: 2017-01-06 | End: 2017-01-08 | Stop reason: HOSPADM

## 2017-01-06 RX ADMIN — ONDANSETRON 4 MG: 2 INJECTION INTRAMUSCULAR; INTRAVENOUS at 05:09

## 2017-01-06 RX ADMIN — SODIUM CHLORIDE, POTASSIUM CHLORIDE, SODIUM LACTATE AND CALCIUM CHLORIDE: 600; 310; 30; 20 INJECTION, SOLUTION INTRAVENOUS at 01:57

## 2017-01-06 RX ADMIN — CEFAZOLIN 2 G: 1 INJECTION, POWDER, FOR SOLUTION INTRAMUSCULAR; INTRAVENOUS at 05:00

## 2017-01-06 RX ADMIN — KETOROLAC TROMETHAMINE 30 MG: 30 INJECTION, SOLUTION INTRAMUSCULAR at 12:40

## 2017-01-06 RX ADMIN — Medication 5 ML: at 02:38

## 2017-01-06 RX ADMIN — LIDOCAINE HYDROCHLORIDE,EPINEPHRINE BITARTRATE 3 ML: 15; .005 INJECTION, SOLUTION EPIDURAL; INFILTRATION; INTRACAUDAL; PERINEURAL at 02:34

## 2017-01-06 RX ADMIN — PHENYLEPHRINE HYDROCHLORIDE 150 MCG: 10 INJECTION, SOLUTION INTRAMUSCULAR; INTRAVENOUS; SUBCUTANEOUS at 05:36

## 2017-01-06 RX ADMIN — SODIUM CHLORIDE, POTASSIUM CHLORIDE, SODIUM LACTATE AND CALCIUM CHLORIDE 1000 ML: 600; 310; 30; 20 INJECTION, SOLUTION INTRAVENOUS at 00:55

## 2017-01-06 RX ADMIN — OXYTOCIN-SODIUM CHLORIDE 0.9% IV SOLN 30 UNIT/500ML 300 UNITS/HR: 30-0.9/5 SOLUTION at 05:08

## 2017-01-06 RX ADMIN — KETOROLAC TROMETHAMINE 30 MG: 30 INJECTION, SOLUTION INTRAMUSCULAR at 18:38

## 2017-01-06 RX ADMIN — Medication 10 ML/HR: at 02:44

## 2017-01-06 RX ADMIN — SENNOSIDES AND DOCUSATE SODIUM 1 TABLET: 8.6; 5 TABLET ORAL at 20:10

## 2017-01-06 RX ADMIN — KETOROLAC TROMETHAMINE 30 MG: 30 INJECTION, SOLUTION INTRAMUSCULAR at 05:54

## 2017-01-06 RX ADMIN — SODIUM CHLORIDE, POTASSIUM CHLORIDE, SODIUM LACTATE AND CALCIUM CHLORIDE: 600; 310; 30; 20 INJECTION, SOLUTION INTRAVENOUS at 05:38

## 2017-01-06 RX ADMIN — CHLOROPROCAINE HYDROCHLORIDE 10 ML: 30 INJECTION, SOLUTION EPIDURAL; INFILTRATION; INTRACAUDAL; PERINEURAL at 04:47

## 2017-01-06 RX ADMIN — Medication 5 ML: at 02:43

## 2017-01-06 RX ADMIN — LIDOCAINE HYDROCHLORIDE,EPINEPHRINE BITARTRATE 5 ML: 20; .005 INJECTION, SOLUTION EPIDURAL; INFILTRATION; INTRACAUDAL; PERINEURAL at 05:12

## 2017-01-06 RX ADMIN — Medication 2.5 MILLION UNITS: at 03:34

## 2017-01-06 RX ADMIN — SODIUM CHLORIDE, POTASSIUM CHLORIDE, SODIUM LACTATE AND CALCIUM CHLORIDE: 600; 310; 30; 20 INJECTION, SOLUTION INTRAVENOUS at 04:49

## 2017-01-06 RX ADMIN — OXYTOCIN-SODIUM CHLORIDE 0.9% IV SOLN 30 UNIT/500ML 100 ML/HR: 30-0.9/5 SOLUTION at 07:43

## 2017-01-06 RX ADMIN — ACETAMINOPHEN 975 MG: 325 TABLET, FILM COATED ORAL at 18:38

## 2017-01-06 RX ADMIN — PHENYLEPHRINE HYDROCHLORIDE 150 MCG: 10 INJECTION, SOLUTION INTRAMUSCULAR; INTRAVENOUS; SUBCUTANEOUS at 05:27

## 2017-01-06 RX ADMIN — CHLOROPROCAINE HYDROCHLORIDE 10 ML: 30 INJECTION, SOLUTION EPIDURAL; INFILTRATION; INTRACAUDAL; PERINEURAL at 04:57

## 2017-01-06 RX ADMIN — ACETAMINOPHEN 975 MG: 325 TABLET, FILM COATED ORAL at 10:30

## 2017-01-06 RX ADMIN — SODIUM CITRATE AND CITRIC ACID MONOHYDRATE 30 ML: 500; 334 SOLUTION ORAL at 04:47

## 2017-01-06 RX ADMIN — MORPHINE SULFATE 2 MG: 1 INJECTION EPIDURAL; INTRATHECAL; INTRAVENOUS at 05:26

## 2017-01-06 NOTE — PROVIDER NOTIFICATION
17 0440   FHR   Monitor External US;FSE  (FSE placed)   Variability 6-25 BPM   Baseline Rate (Fetus A) 135 bpm   Baseline Classification Normal   Accelerations Not present   Decelerations PD     Decel noted on central monitor at 0433, 2 RNs entered pt room at 0433. Pt repositioned from left lateral to right lateral, O2 therapy started, pt repositioned back to left lateral and then hands and knees. FHR remained 60s-80s. IV bolus started during this time. FHR inconsistent tracing but audible in 60s-80s with external US. MD Candice and third RN arrived at bedside and informed of prolonged decel with liliana to 60s. SVE per Candice 1.5 cm. MD Jhonatan arrived at bedside. Pitocin stopped. FSE placed per MD Jhonatan. MDs discussed with pt the importance and necessity of doing a  section. Pt signed consent for  section, SCDs placed and bicitra given in pt room. Report given to Aileen GODINEZ RN at 0445. Pt brought to OR. Braun catheter placed in OR per Dr. Harvey, surgical area shaved and simone wiped per Aileen GODINEZ RN in OR.

## 2017-01-06 NOTE — ANESTHESIA PROCEDURE NOTES
Peripheral Nerve Block Procedure Note    Staff:     Anesthesiologist:  CAROL BRAXTON    Resident/CRNA:  ROMULO DAVIS    Block performed by resident/CRNA in the presence of a teaching physician    Location: OR AFTER induction  Procedure Start/Stop TImes:      1/6/2017 5:40 AM     1/6/2017 5:50 AM    patient identified, IV checked, site marked, risks and benefits discussed, informed consent, monitors and equipment checked, pre-op evaluation, at physician/surgeon's request and post-op pain management      Correct Patient: Yes      Correct Position: Yes      Correct Site: Yes      Correct Procedure: Yes      Correct Laterality:  N/A    Site Marked:  N/A  Procedure details:     Procedure:  TAP    ASA:  2    Laterality:  Bilateral    Position:  Supine    Sterile Prep: chloraprep      Local skin infiltration:  None    Needle:  Touhy needle    Needle gauge:  20    Needle length (inches):  4    Ultrasound: Yes      Ultrasound used to identify targeted nerve, plexus, or vascular structure and placed a needle adjacent to it      Permanent Image entered into patiient's record      Abnormal pain on injection: No      Blood Aspirated: No      Paresthesias:  No    Bleeding at site: No      Test dose negative for signs of intravascular injection: Yes      Bolus via:  Needle    Infusion Method:  Single Shot    Blood aspirated via catheter: No      Complications:  None  Assessment/Narrative:      10mL 0.25% bupivacaine with 1:200k epi and 10mL Exparel diluted in 10mL NS injected into each site.    Call with questions or concerns,    Romulo Zuluaga MD  Anesthesiology Resident   [phone]

## 2017-01-06 NOTE — PROGRESS NOTES
"Labor progress note    S: Uncomfortable with contractions, using nitrous oxide    O:   Patient Vitals for the past 24 hrs:   BP Temp Temp src Pulse Resp Height Weight   17 1930 131/81 mmHg 98.4  F (36.9  C) Oral - 16 - -   17 1825 142/84 mmHg 97.8  F (36.6  C) Oral - 16 - -   17 1625 - 98.2  F (36.8  C) Oral - - - -   17 1525 116/66 mmHg - - - 16 - -   17 1425 - 97.8  F (36.6  C) Axillary - - - -   17 1207 114/70 mmHg 97.9  F (36.6  C) Oral - 16 - -   17 1036 115/75 mmHg 98.2  F (36.8  C) Oral - 16 - -   17 0740 - 97.7  F (36.5  C) Oral - 18 - -   17 0545 139/84 mmHg 98.3  F (36.8  C) - 67 16 - -   17 0345 131/88 mmHg - - - - - -   17 0335 (!) 137/96 mmHg 98  F (36.7  C) Oral - 18 1.609 m (5' 3.35\") 76 kg (167 lb 8.8 oz)      Gen: Alert, NAD, easily mobile  Cervix: 1/80/-3  FHT:  BPM, moderate variability, accels present, spontaneous decel  and  to 110-120 BPM, self-limiting with moderate variability    A/P:  Ms. Laila Cueva is a 31 year old  at 39w4d by 8w4d US, here with presumed PROM given decreased LUCERO and pooling. She continues to contract spontaneously.     Labor:   - Now progressing following PROM. Excellent contraction pattern, Pitocin started 1130 today.               - Pain: PRN per patient wishes. Currently nitrous oxide.               - Anticipate .  Avoid operative delivery d/t possible vWD and AC>99%ile   - With prolonged ROM, monitor closely for fevers, fetal/maternal tachycardia  PNC:    - Rh positive, Rubella immune, GBS pos- PCN at 0630, , Placenta anterior    2. Fetal well being  - primarily category I FHT with brief periods of category II. Generally reactive.     3. Elevated blood pressures  - Continue to monitor. Consider HELLP labs if consitently elevated or with other symptoms. Discussed with Dr. Israel.     Amaya Marcelino, PGY2  OB/Gyn  2017, 8:28 PM          "

## 2017-01-06 NOTE — PROGRESS NOTES
Called in to see patient for fetal deceleration.    At 433, patient had a 7 minute deceleration that showed a delayed improvement following re-positioning to hands and knees. cvx 1.5 cm, 90%. FSE applied. Discussed with patient recommendation of c/s. RBA discussed and patient agreed to proceed.    Beverly Harvey MD 1/6/2017 4:45 AM

## 2017-01-06 NOTE — PLAN OF CARE
Problem: Postpartum ( Delivery) (Adult)  Goal: Signs and Symptoms of Listed Potential Problems Will be Absent or Manageable (Postpartum)  Signs and symptoms of listed potential problems will be absent or manageable by discharge/transition of care (reference Postpartum ( Delivery) (Adult) CPG).   Outcome: Improving  Data: Laila Cueva transferred to 71 via cart at 0800. Baby transferred via parent's arms.  Action: Receiving unit notified of transfer: Yes. Patient and family notified of room change. Report given to Rebecca CASTELLANOS RN at 0800. Belongings sent to receiving unit. Accompanied by Registered Nurse. Oriented patient to surroundings. Call light within reach. ID bands double-checked with receiving RN.  Response: Patient tolerated transfer and is stable.

## 2017-01-06 NOTE — PLAN OF CARE
Problem: Goal Outcome Summary  Goal: Goal Outcome Summary  Outcome: Improving  Patient stable through out shift. VSS. Adequate output in young, tolerating orals well and eating well. Patient states adequate pain control. Patient up to side of bed and stood at bedside well. Patient needs a little assistance with breastfeeding, mainly latching and positioning. Patient denies any needs at this time. Continue with plan of care.

## 2017-01-06 NOTE — OR NURSING
Pt gave birth to male via c/s at 0506.  Had TAPS block in OR.  Arrived in PACU at 0558.  Oxytocin placed on pump at 100 mL/hr.  VSS.  Fundus firm, midline, u/1 with scant bleeding.  Braun draining adequate amount of clear urine.  at bedside. Bonding with infant.

## 2017-01-06 NOTE — PLAN OF CARE
Problem: Labor (Cervical Ripen, Induct, Augment) (Adult,Obstetrics,Pediatric)  Goal: Signs and Symptoms of Listed Potential Problems Will be Absent or Manageable (Labor)  Signs and symptoms of listed potential problems will be absent or manageable by discharge/transition of care (reference Labor (Cervical Ripen, Induct, Augment) (Adult,Obstetrics,Pediatric) CPG).   Outcome: Improving  Labor Pain Intervention  DATA:  Patient reports increased labor pain at 0030. Requested to have cervical exam to determine progress and decide if ready for epidural.  INTERVENTIONS:   Epidural administered per MDA at 0240.  ASSESSMENT:   Tolerated well.  Relief complete. Pt reports no labor pain. Able to sleep.  PLAN:  Continue intrapartum cares and assessments.

## 2017-01-06 NOTE — OP NOTE
[facility]  Full Operative Progress Note     Surgery Date:  2017   Surgeon:  Ana Maria Israel MD  Assistants:  Amaya Marcelino MD, PGY-2      Pre-op Diagnosis:    1. Intrauterine pregnancy at 39w5d by 8w4d US   2. PROM at term  3. Category II FHT remote from delivery   4. 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 Possible von Willebrand's disease  6. Mild thrombocytopenia, 126    Post-op Diagnosis:    1. Same    2. Liveborn male infant   Procedure:  Primary low-transverse  section with double layer uterine closure via Pfannenstiel incision    Anesthesia: epidural  EBL:  1000 mL  IVF:  1000 mL crystalloid  UOP:  200 mL clear urine at the end of the case  Drains: Braun Catheter   Specimens:  None  Complications: None     Indications:   Laila Cueva is a 31 year old  at 39w4d admitted for IOL following PROM at term. She received Pitocin for augmentation for 16 hours. Her cervical exam had minimal progression. At 2 cm dilation, she had a prolonged deceleration, ultimately with recovery to baseline, but remote from delivery. The risks, benefits, and alternatives of  section were discussed with the patient, and she agreed to proceed.     Findings:   1. Meconium stained amniotic fluid  2. Livebornmale infant in CODY presentation with nuchal cord. Apgars 8 at 1 minute & 9 at 5 minutes. Weight 3969 g.  3. Normal uterus, fallopian tubes, and ovaries.     Procedure Details:   The patient was brought to the OR, where adequate epidural anesthesia was administered.  She was placed in the dorsal supine position with a slight leftward tilt. She was prepped and draped in the usual sterile fashion. A surgical time out was performed. A Pfannenstiel skin incision was made with the scalpel, and carried down to the underlying fascia with sharp and blunt dissection. The fascia was incised in the midline, and the incision was extended laterally with the Chin scissors. The superior  aspect of the fascia was grasped with the Kocher clamps and dissected off of the underlying rectus muscles with blunt and sharp dissection. Attention was then turned to the inferior aspect of the fascia, which was similarly dissected off of the underlying rectus muscles. The rectus muscles were  in the midline, and the peritoneum was entered bluntly, and the opening was extended with digital pressure. The bladder blade was placed. A transverse hysterotomy was made with the scalpel in the lower uterine segment, and the incision was extended with digital pressure. The infant was noted to be in the CODY position, and was delivered atraumatically. The shoulders delivered easily.  A single nuchal cord was noted. The cord was doubly clamped and cut after 1 minutes' delay, and the infant was handed off to the awaiting nursery staff. A segment of cord was cut and saved if needed. The placenta was delivered with gentle traction on the umbilical cord and uterine massage. The uterus was exteriorized and cleared of all clots and debris. Uterine tone was noted to be adequate with 30 units of Pitocin given through the running IV and uterine massage.  The hysterotomy was closed with a running locked suture of 0 Monocryl. The hysterotomy was then imbricated using an 0 Monocryl suture. The hysterotomy was noted to be hemostatic. The posterior cul-de-sac was irrigated and cleared of all clots and debris. The uterus was returned to the abdomen. The pericolic gutters were also cleared of all clots and debris. The hysterotomy was reexamined and noted to be hemostatic. The fascia and rectus muscles were examined and areas of oozing were controlled with electrocautery. Seprafilm was placed over the hysterotomy and rectus muscles. The fascia was closed with a running 0 Vicryl suture. The subcutaneous tissue was irrigated and areas of oozing were controlled with electrocautery. The subcutaneous tissue was less than 2 cm in  thickness, and was therefore not closed. The skin was closed with 4-0 Monocryl and covered with a sterile dressing.    All sponge, needle, and instrument counts were correct. The patient tolerated the procedure well, and was transferred to recovery in stable condition. Dr. Israel was present and scrubbed for the entirety of the procedure.     Amaya Marcelino MD  Ob/Gyn, PGY-2  1/6/2017, 5:45 AM      Ana Maria Israel MD

## 2017-01-06 NOTE — DISCHARGE SUMMARY
[facility] Discharge Summary    Laila Cueva MRN# [id number]   Age: 31 year old YOB: 1985     Date of Admission:  2017  Date of Discharge:  2017   Admitting Physician:  Kelly Carver MD  Discharge Physician:  Dr. De La Paz    Admit Dx:   - Intrauterine pregnancy at 39w4d  - PROM  - Phenylketonuria   - Possible (unconfirmed) mild von Willebrand's disease   - Thrombocytopenia, likely gestational     Discharge Dx:  - Same as above, s/p primary low transverse  section for Category 2 FHT  - Asymptomatic surgical blood loss anemia    Procedures:  - Primary low transverse  section with double layer uterine closure via Pfannenstiel incision  - Spinal analgesia    Admit HPI:  Laila Cueva is a 32 yo  who presented at 39w4d confirmed by 824d US for vaginal spotting of 2 days duration and frequent non-painful contractions. An US confirmed a significant decrease in LUCERO consistent with PROM and tocometer confirmed frequent but contractions so she was admitted for continuous monitoring. Abruption labs were wnl. She was allowed to labor   Please see her admit H&P for full details of her PMH, PSH, Meds, Allergies and exam on admit.    Operative Course:  Surgery was uncomplicated. EBL from the delivery was 1000 cc. Please see her  Section Operative Note for full details regarding her delivery.    Operative Findings:   1. Meconium stained amniotic fluid  2. Liveborn male infant in CODY presentation with nuchal cord. Apgars 8 at 1 minute & 9 at 5 minutes. Weight 3969 g.  3. Normal uterus, fallopian tubes, and ovaries.    Postoperative Course:  Her postoperative course was uncomplicated. She did have gestational thrombocytopenia after admission with platelets of 126, and post-delivery platelets of 105. On POD#2, she was meeting all of her postpartum goals and deemed stable for discharge. She was voiding without difficulty, tolerating a regular  diet without nausea and vomiting, her pain was well controlled on oral pain medicines and her lochia was appropriate. Her hemoglobin prior to delivery was 12.3 and after delivery was 9.7. Her Rh status was positive and Rhogam was not indicated.    Discharge Medications:  Discharge Medication List as of 1/8/2017  9:41 AM      START taking these medications    Details   senna-docusate (SENOKOT-S;PERICOLACE) 8.6-50 MG per tablet Take 1-2 tablets by mouth 2 times daily as needed for constipation, Disp-100 tablet, R-0, E-Prescribe      ibuprofen (ADVIL/MOTRIN) 600 MG tablet Take 1 tablet (600 mg) by mouth every 6 hours as needed for moderate pain, Disp-60 tablet, R-1, E-Prescribe      oxyCODONE-acetaminophen (PERCOCET) 5-325 MG per tablet Take 1-2 tablets by mouth every 4 hours as needed for moderate to severe pain, Disp-30 tablet, R-0, Local Print      ferrous sulfate (IRON) 325 (65 FE) MG tablet Take 1 tablet (325 mg) by mouth daily (with breakfast), Disp-60 tablet, R-0, E-Prescribe         CONTINUE these medications which have NOT CHANGED    Details   sapropterin dihydrochloride (KUVAN) 100 MG TBSO Take 12 tablets by mouth once daily with food for a total daily dose of 1200 mg. Take each dose with a meal., Disp-360 tablet, R-11, Fax      Prenatal Vit-Fe Fumarate-FA (PRENATAL MULTIVITAMIN  PLUS IRON) 27-0.8 MG TABS Take 1 tablet by mouth daily, Historical      Nutritional Supplements (PHENYLADE ESSENTIAL DRINK MIX) PACK Take 2 Packages by mouth daily, Disp-62 packet, R-3, FaxPlease do benefit check for patient and notify [address] of determined coverage               Discharge/Disposition:  Laila Cueva was discharged to home in stable condition with the following instructions/medications:  1) Call for temperature > 100.4, bright red vaginal bleeding >1 pad an hour x 2 hours, foul smelling vaginal discharge, pain not controlled by usual oral pain meds, persistent nausea and vomiting not controlled on  medications, drainage or redness from incision site  2) She was undecided for contraception.  3) For feeding she decided to breastfeed.  4) She was instructed to follow-up with her primary OB in 6 weeks for a routine postpartum visit. She will also need repeat testing for von Willebrand disease.  5) Discharge activity:  No heavy lifting >15 lbs or strenuous activity for 6 weeks, pelvic rest for 6 weeks, no driving or operating machinery while on narcotics.    Laila Iqbal MD PGY3  Department of OB/GYN  1/8/2017 6:48 PM

## 2017-01-06 NOTE — PROGRESS NOTES
"Labor progress note    S: Increasingly uncomfortable, considering epidural. Requests recheck earlier than planned.     O:   Patient Vitals for the past 24 hrs:   BP Temp Temp src Pulse Resp Height Weight   17 2317 135/84 mmHg 98.4  F (36.9  C) Oral - 18 - -   17 2230 128/84 mmHg - - - 16 - -   17 2145 136/78 mmHg - - - 16 - -   17 2130 157/84 mmHg 98.3  F (36.8  C) Oral - 16 - -   17 2030 140/88 mmHg - - - 16 - -   17 1930 131/81 mmHg 98.4  F (36.9  C) Oral - 16 - -   17 1825 142/84 mmHg 97.8  F (36.6  C) Oral - 16 - -   17 1625 - 98.2  F (36.8  C) Oral - - - -   17 1525 116/66 mmHg - - - 16 - -   17 1425 - 97.8  F (36.6  C) Axillary - - - -   17 1207 114/70 mmHg 97.9  F (36.6  C) Oral - 16 - -   17 1036 115/75 mmHg 98.2  F (36.8  C) Oral - 16 - -   17 0740 - 97.7  F (36.5  C) Oral - 18 - -   17 0545 139/84 mmHg 98.3  F (36.8  C) - 67 16 - -   17 0345 131/88 mmHg - - - - - -   17 0335 (!) 137/96 mmHg 98  F (36.7  C) Oral - 18 1.609 m (5' 3.35\") 76 kg (167 lb 8.8 oz)      Gen: Alert, NAD, easily mobile  Cervix: /-3, although looser   FHT:  BPM, moderate variability, accels present, currently no decelerations    A/P:  Ms. Laila Cueva is a 31 year old  at 39w4d by 8w4d US, here with presumed PROM given decreased LUCERO and pooling. She continues to contract spontaneously.     Labor:   - Minimal progress following PROM. Excellent contraction pattern, Pitocin started 1130 today.               - Pain: PRN per patient wishes. Currently nitrous oxide.               - Anticipate .  Avoid operative delivery d/t possible vWD and AC>99%ile   - With prolonged ROM, monitor closely for fevers, fetal/maternal tachycardia  PNC:    - Rh positive, Rubella immune, GBS pos- PCN at 0630, , Placenta anterior    2. Fetal well being  - Currently category I FHT, reactive.     3. Elevated blood pressures  - Continue to " monitor. Consider HELLP labs if consitently elevated or with other symptoms. Discussed with Dr. Israel.     Amaya Marcelino, PGY2  OB/Gyn  1/6/2017, 12:54 AM

## 2017-01-06 NOTE — ANESTHESIA PROCEDURE NOTES
Epidural Procedure Note    Staff:     Anesthesiologist:  CAROL BRAXTON  Location: OB   Pre-procedure checklist:   patient identified, IV checked, site marked, risks and benefits discussed, informed consent, monitors and equipment checked, pre-op evaluation, at physician/surgeon's request and post-op pain management      Correct Patient: Yes      Correct Position: Yes      Correct Site: Yes      Correct Procedure: Yes      Correct Laterality:  Yes    Site Marked:  Yes  Procedure:     Procedure:  Epidural catheter    ASA:  2    Diagnosis:  Active labor    Position:  Sitting    Sterile Prep: chloraprep, mask, sterile gloves and patient draped      Insertion site:  L3-4    Local skin infiltration:  1% lidocaine    amount (mL):  3    Approach:  Midline    Needle gauge (G):  17    Needle Length (in):  3.5    Block Needle Type:  Yeisonuhjeane    Injection Technique:  LORT saline    TIARRA at (cm):  4    Attempts:  1    Redirects:  0    Catheter gauge (G):  19    Threaded to cm at skin:  10    Threaded in epidural space (cm):  6    Paresthesias:  No    Aspiration negative for Heme or CSF: Yes      Test dose (mL):  3     Local anesthetic:  Lidocaine 1.5% w/ 1:200,000 epinephrine    Test dose time:  02:34    Test dose negative for signs of intravascular, subdural or intrathecal injection: Yes

## 2017-01-06 NOTE — PROGRESS NOTES
Labor Shift Note  Data: Contraction frequency q 1.5-3 minutes and irregular, palpate moderate. Fetal assessment category one.   Filed Vitals:    17 1930 17 2030 17 2130 17 2145   BP: 131/81 140/88 157/84 136/78   Pulse:       Temp: 98.4  F (36.9  C)  98.3  F (36.8  C)    TempSrc: Oral  Oral    Resp: 16 16 16 16   Height:       Weight:       . I/O this shift:  In: 400 [P.O.:400]  Out: 925 [Urine:925].  Leaking scant  amounts of clear fluid.  Signs and symptoms of infection absent .  Blood pressures WDL. Signs and symptoms of pre-eclampsia: absent. Support person Tray and mother present.  Interventions: Continue uterine/fetal assessment continuous. Vital Signs per order set. Comfort measures tub/sling/birthball/heat packs.  Medicated for nitrous oxide.  Plan: Anticipate . Provide labor/coping assistance as needed by patient and support person.  Observe for and notify care provider of indications of progressing labor, need for pain medications,  or signs of fetal/maternal compromise.

## 2017-01-06 NOTE — ANESTHESIA PREPROCEDURE EVALUATION
Anesthesia Evaluation       history and physical reviewed . Pt has not had prior anesthetic     No history of anesthetic complications     ROS/MED HX    ENT/Pulmonary:  - neg pulmonary ROS     Neurologic:  - neg neurologic ROS     Cardiovascular: Comment: Raynaud phenomenon  - neg cardiovascular ROS   (+) ----. : . . . :. . No previous cardiac testing       METS/Exercise Tolerance:  >4 METS   Hematologic:     (+) Other Hematologic Disorder-Von willebrand disease      Musculoskeletal:  - neg musculoskeletal ROS       GI/Hepatic:  - neg GI/hepatic ROS       Renal/Genitourinary:  - ROS Renal section negative       Endo:  - neg endo ROS       Psychiatric:  - neg psychiatric ROS       Infectious Disease:  - neg infectious disease ROS       Malignancy:      - no malignancy   Other:    (+) Possibly pregnant   - neg other ROS         Procedure: * No surgery found *    HPI: Laila Cueva is a 31 year old female  at 39w4d gestation, past medical history notable for PKU and, possible von Willebrand disease under recent diagnostic workup. Requests an epidural catheter placement for labor analgesia.        PMHx/PSHx:  Past Medical History   Diagnosis Date     Atypical PKU (H)      since birth, manage with diet      Von Willebrand disease (H)      affects mainly menses     Raynaud phenomenon      affects fingers and toes     Von Willebrand disease (H)        Past Surgical History   Procedure Laterality Date     Biopsy of skin lesion       2 benign nevus removed     Hc tooth extraction w/forcep           No current facility-administered medications on file prior to encounter.  Current Outpatient Prescriptions on File Prior to Encounter:  sapropterin dihydrochloride (KUVAN) 100 MG TBSO Take 12 tablets by mouth once daily with food for a total daily dose of 1200 mg. Take each dose with a meal.   Prenatal Vit-Fe Fumarate-FA (PRENATAL MULTIVITAMIN  PLUS IRON) 27-0.8 MG TABS Take 1 tablet by mouth daily   Nutritional  Supplements (PHENYLADE ESSENTIAL DRINK MIX) PACK Take 2 Packages by mouth daily       Social Hx:   Social History   Substance Use Topics     Smoking status: Never Smoker      Smokeless tobacco: Never Used     Alcohol Use: No         Allergies:   Allergies   Allergen Reactions     Aspartame      Nuts Other (See Comments)       Labs:    Blood Bank:  ABO        A   1/5/2017  RH       Pos   1/5/2017  AS       Neg   6/2/2016  BMP:  Recent Labs   Lab Test  08/10/15   0912   NA  139   POTASSIUM  3.8   CHLORIDE  107   CO2  24   BUN  6*   CR  0.87   GLC  84   MIGUE  9.2     CBC:   Recent Labs   Lab Test  01/05/17   0525   WBC  8.5   RBC  4.08   HGB  12.3   HCT  37.1   MCV  91   MCH  30.1   MCHC  33.2   RDW  13.7   PLT  126*     Coags:  Recent Labs   Lab Test  01/05/17   0525   INR  0.91   FIBR  392       Physical Exam  Normal systems: cardiovascular, pulmonary and dental    Airway   Mallampati: II  TM distance: > 3 FB  Neck ROM: full  Mouth opening: > 3 cm    Dental     Cardiovascular       Pulmonary    breath sounds clear to auscultation          neg OB ROS                 Anesthesia Plan      History & Physical Review  History and physical reviewed and following examination; no interval change.    ASA Status:  2 .  OB Epidural Asa: 2 and emergent   NPO Status:  Full stomach    Plan for Epidural with Other induction. Maintenance will be Other.           Postoperative Care      Consents  Anesthetic plan, risks, benefits and alternatives discussed with:  Patient..

## 2017-01-06 NOTE — ANESTHESIA POSTPROCEDURE EVALUATION
Patient: Laila Cueva     SECTION (N/A Abdomen)  Additional InformationProcedure(s):   - Wound Class: II-Clean Contaminated    Diagnosis:pregnant  Diagnosis Additional Information: No value filed.    Anesthesia Type:  Epidural    Note:  Anesthesia Post Evaluation    Patient location during evaluation: PACU and Bedside  Patient participation: Able to fully participate in evaluation  Level of consciousness: awake and alert  Pain management: adequate  Airway patency: patent  Cardiovascular status: acceptable  Respiratory status: acceptable  Hydration status: acceptable  PONV: none     Anesthetic complications: None          Last vitals:  Filed Vitals:    17 0600 17 0615 17 0630   BP: 103/85 104/90 127/86   Pulse:      Temp: 36.6  C (97.8  F)     Resp: 14 13 13   SpO2: 97% 97% 97%       Electronically Signed By: Supriya Mann MD  2017  6:49 AM

## 2017-01-06 NOTE — ANESTHESIA PREPROCEDURE EVALUATION
Anesthesia Evaluation       history and physical reviewed . Pt has not had prior anesthetic     No history of anesthetic complications     ROS/MED HX    ENT/Pulmonary:  - neg pulmonary ROS     Neurologic:  - neg neurologic ROS     Cardiovascular: Comment: Raynaud phenomenon  - neg cardiovascular ROS   (+) ----. : . . . :. . No previous cardiac testing       METS/Exercise Tolerance:  >4 METS   Hematologic:     (+) Other Hematologic Disorder-Von willebrand disease      Musculoskeletal:  - neg musculoskeletal ROS       GI/Hepatic:  - neg GI/hepatic ROS       Renal/Genitourinary:  - ROS Renal section negative       Endo:  - neg endo ROS       Psychiatric:  - neg psychiatric ROS       Infectious Disease:  - neg infectious disease ROS       Malignancy:      - no malignancy   Other:    (+) Possibly pregnant   - neg other ROS         Procedure: Procedure(s):   - Wound Class: I-Clean    HPI: Laila Cueva is a 31 year old female  at 39w4d gestation, past medical history notable for PKU and, possible von Willebrand disease under recent diagnostic workup. Requests an epidural catheter placement for labor analgesia.        PMHx/PSHx:  Past Medical History   Diagnosis Date     Atypical PKU (H)      since birth, manage with diet      Von Willebrand disease (H)      affects mainly menses     Raynaud phenomenon      affects fingers and toes     Von Willebrand disease (H)        Past Surgical History   Procedure Laterality Date     Biopsy of skin lesion       2 benign nevus removed     Hc tooth extraction w/forcep           No current facility-administered medications on file prior to encounter.  Current Outpatient Prescriptions on File Prior to Encounter:  sapropterin dihydrochloride (KUVAN) 100 MG TBSO Take 12 tablets by mouth once daily with food for a total daily dose of 1200 mg. Take each dose with a meal.   Prenatal Vit-Fe Fumarate-FA (PRENATAL MULTIVITAMIN  PLUS IRON) 27-0.8 MG TABS Take 1 tablet by mouth daily    Nutritional Supplements (PHENYLADE ESSENTIAL DRINK MIX) PACK Take 2 Packages by mouth daily       Social Hx:   Social History   Substance Use Topics     Smoking status: Never Smoker      Smokeless tobacco: Never Used     Alcohol Use: No         Allergies:   Allergies   Allergen Reactions     Aspartame      Nuts Other (See Comments)       Labs:    Blood Bank:  ABO        A   1/5/2017  RH       Pos   1/5/2017  AS       Neg   6/2/2016  BMP:  Recent Labs   Lab Test  08/10/15   0912   NA  139   POTASSIUM  3.8   CHLORIDE  107   CO2  24   BUN  6*   CR  0.87   GLC  84   MIGUE  9.2     CBC:   Recent Labs   Lab Test  01/05/17   0525   WBC  8.5   RBC  4.08   HGB  12.3   HCT  37.1   MCV  91   MCH  30.1   MCHC  33.2   RDW  13.7   PLT  126*     Coags:  Recent Labs   Lab Test  01/05/17   0525   INR  0.91   FIBR  392       Physical Exam  Normal systems: cardiovascular, pulmonary and dental    Airway   Mallampati: II  TM distance: > 3 FB  Neck ROM: full  Mouth opening: > 3 cm    Dental     Cardiovascular       Pulmonary    breath sounds clear to auscultation          neg OB ROS                   Anesthesia Plan      History & Physical Review  History and physical reviewed and following examination; no interval change.    ASA Status:  2 .  OB Epidural Asa: 2 and emergent   NPO Status:  Full stomach    Plan for Epidural with Other induction. Maintenance will be Other.      Patient taken to the OR for emergency c-sectio due to fetal HR decelerations. Plan to use in situ epidural catheter.      Postoperative Care      Consents  Anesthetic plan, risks, benefits and alternatives discussed with:  Patient..

## 2017-01-06 NOTE — ANESTHESIA CARE TRANSFER NOTE
Patient: Laila Cueva     SECTION (N/A Abdomen)  Additional InformationProcedure(s):   - Wound Class: II-Clean Contaminated    Diagnosis: pregnant  Diagnosis Additional Information: No value filed.    Anesthesia Type:   Epidural     Note:  Airway :Room Air  Patient transferred to:PACU  Comments: Tolerated the procedure under epidural anesthesia without immediate complication. Hemodynamically normal and in no acute distress; denies nausea or pain. Ventilating and oxygenating appropriately at room air. Bilateral TAP blocks performed intraoperatively.       Call with questions or concerns,    Romulo Zuluaga MD  Anesthesiology Resident   [phone]                 Vitals: (Last set prior to Anesthesia Care Transfer)              Electronically Signed By: Romulo Powell MD, MD  2017  6:07 AM

## 2017-01-07 LAB
BLD PROD TYP BPU: NORMAL
BLD UNIT ID BPU: 0
BLOOD PRODUCT CODE: NORMAL
BPU ID: NORMAL
HGB BLD-MCNC: 9.7 G/DL (ref 11.7–15.7)
PLATELET # BLD AUTO: 105 10E9/L (ref 150–450)
TRANSFUSION STATUS PATIENT QL: NORMAL
TRANSFUSION STATUS PATIENT QL: NORMAL

## 2017-01-07 PROCEDURE — 36415 COLL VENOUS BLD VENIPUNCTURE: CPT | Performed by: OBSTETRICS & GYNECOLOGY

## 2017-01-07 PROCEDURE — 12000028 ZZH R&B OB UMMC

## 2017-01-07 PROCEDURE — 25000125 ZZHC RX 250: Performed by: OBSTETRICS & GYNECOLOGY

## 2017-01-07 PROCEDURE — 85018 HEMOGLOBIN: CPT | Performed by: OBSTETRICS & GYNECOLOGY

## 2017-01-07 PROCEDURE — 25000132 ZZH RX MED GY IP 250 OP 250 PS 637: Performed by: OBSTETRICS & GYNECOLOGY

## 2017-01-07 PROCEDURE — 85049 AUTOMATED PLATELET COUNT: CPT | Performed by: OBSTETRICS & GYNECOLOGY

## 2017-01-07 RX ADMIN — IBUPROFEN 800 MG: 400 TABLET ORAL at 21:59

## 2017-01-07 RX ADMIN — ACETAMINOPHEN 975 MG: 325 TABLET, FILM COATED ORAL at 03:06

## 2017-01-07 RX ADMIN — OXYCODONE HYDROCHLORIDE 5 MG: 5 TABLET ORAL at 09:18

## 2017-01-07 RX ADMIN — KETOROLAC TROMETHAMINE 30 MG: 30 INJECTION, SOLUTION INTRAMUSCULAR at 00:59

## 2017-01-07 RX ADMIN — ACETAMINOPHEN 975 MG: 325 TABLET, FILM COATED ORAL at 10:50

## 2017-01-07 RX ADMIN — ACETAMINOPHEN 975 MG: 325 TABLET, FILM COATED ORAL at 18:25

## 2017-01-07 RX ADMIN — IBUPROFEN 800 MG: 400 TABLET ORAL at 07:11

## 2017-01-07 RX ADMIN — SENNOSIDES AND DOCUSATE SODIUM 2 TABLET: 8.6; 5 TABLET ORAL at 19:28

## 2017-01-07 RX ADMIN — OXYCODONE HYDROCHLORIDE 5 MG: 5 TABLET ORAL at 18:25

## 2017-01-07 RX ADMIN — OXYCODONE HYDROCHLORIDE 5 MG: 5 TABLET ORAL at 15:07

## 2017-01-07 RX ADMIN — IBUPROFEN 800 MG: 400 TABLET ORAL at 12:59

## 2017-01-07 RX ADMIN — SENNOSIDES AND DOCUSATE SODIUM 1 TABLET: 8.6; 5 TABLET ORAL at 09:12

## 2017-01-07 RX ADMIN — OXYCODONE HYDROCHLORIDE 5 MG: 5 TABLET ORAL at 21:59

## 2017-01-07 NOTE — PLAN OF CARE
Problem: Goal Outcome Summary  Goal: Goal Outcome Summary  Outcome: No Change  VSS and assessments WDL. Pain well managed with scheduled medication. Ambulating without difficulty and independent with infant and self care. Braun removed, voided x1. Breastfeeding infant independently, latch checked.  by bedside and supportive. No concerns at this time, continue with current plan of care.

## 2017-01-07 NOTE — PROGRESS NOTES
POD # 1    SUBJECTIVE: Feels well, ambulating.  Tolerating oral intake.     OBJECTIVE:   Patient Vitals for the past 24 hrs:   BP Temp Temp src Pulse Heart Rate Resp SpO2   01/07/17 0830 132/89 mmHg 98.3  F (36.8  C) Oral 104 - 16 -   01/07/17 0430 - 98.6  F (37  C) Oral - - 16 100 %   01/07/17 0030 120/79 mmHg 98.6  F (37  C) Oral 85 - 16 100 %   01/06/17 2009 134/83 mmHg 98.5  F (36.9  C) Oral 77 - 16 98 %   01/06/17 1842 - - - - - 16 100 %   01/06/17 1535 119/86 mmHg 97.8  F (36.6  C) Axillary - 77 18 100 %   01/06/17 1330 - - - - - 18 100 %   01/06/17 1128 122/77 mmHg - - - 80 18 98 %   01/06/17 1026 133/88 mmHg - - - 80 18 97 %     Abdomen soft, non-tender.  Fundus firm.  Incision healing.    ASSESSMENT: Stable POD # 1.    PLAN: Routine care.  See ronna Kearney MD

## 2017-01-07 NOTE — PLAN OF CARE
Problem: Goal Outcome Summary  Goal: Goal Outcome Summary  Outcome: Improving  Doing well POD1. Up ambulating in room caring for self and baby. Voiding w/o difficulty, offered assistance to shower for first time post-op, pt declines. Incision ZUHAIR, no drainage, using abdominal binder. Breastfeeding independently with good latch observed, bonding well with baby boy. Incisional pain well controlled with alternating ibuprofen, rajni, and tylenol. Post-op Hgb 9.7, saline lock removed. Planning to nap now.

## 2017-01-08 VITALS
WEIGHT: 167.55 LBS | HEIGHT: 63 IN | DIASTOLIC BLOOD PRESSURE: 81 MMHG | RESPIRATION RATE: 16 BRPM | BODY MASS INDEX: 29.69 KG/M2 | TEMPERATURE: 98.6 F | HEART RATE: 75 BPM | SYSTOLIC BLOOD PRESSURE: 135 MMHG | OXYGEN SATURATION: 100 %

## 2017-01-08 PROCEDURE — 25000132 ZZH RX MED GY IP 250 OP 250 PS 637: Performed by: OBSTETRICS & GYNECOLOGY

## 2017-01-08 RX ORDER — FERROUS SULFATE 325(65) MG
325 TABLET ORAL
Qty: 60 TABLET | Refills: 0 | Status: SHIPPED | OUTPATIENT
Start: 2017-01-08 | End: 2017-07-10

## 2017-01-08 RX ORDER — AMOXICILLIN 250 MG
1-2 CAPSULE ORAL 2 TIMES DAILY PRN
Qty: 100 TABLET | Refills: 0 | Status: SHIPPED | OUTPATIENT
Start: 2017-01-08 | End: 2017-02-17

## 2017-01-08 RX ORDER — IBUPROFEN 600 MG/1
600 TABLET, FILM COATED ORAL EVERY 6 HOURS PRN
Qty: 60 TABLET | Refills: 1 | Status: SHIPPED | OUTPATIENT
Start: 2017-01-08 | End: 2017-07-10

## 2017-01-08 RX ORDER — OXYCODONE AND ACETAMINOPHEN 5; 325 MG/1; MG/1
1-2 TABLET ORAL EVERY 4 HOURS PRN
Qty: 30 TABLET | Refills: 0 | Status: SHIPPED | OUTPATIENT
Start: 2017-01-08 | End: 2017-02-17

## 2017-01-08 RX ADMIN — OXYCODONE HYDROCHLORIDE 5 MG: 5 TABLET ORAL at 10:56

## 2017-01-08 RX ADMIN — OXYCODONE HYDROCHLORIDE 5 MG: 5 TABLET ORAL at 06:38

## 2017-01-08 RX ADMIN — SENNOSIDES AND DOCUSATE SODIUM 2 TABLET: 8.6; 5 TABLET ORAL at 08:15

## 2017-01-08 RX ADMIN — OXYCODONE HYDROCHLORIDE 5 MG: 5 TABLET ORAL at 02:21

## 2017-01-08 RX ADMIN — ACETAMINOPHEN 975 MG: 325 TABLET, FILM COATED ORAL at 10:55

## 2017-01-08 RX ADMIN — IBUPROFEN 800 MG: 400 TABLET ORAL at 06:38

## 2017-01-08 RX ADMIN — ACETAMINOPHEN 975 MG: 325 TABLET, FILM COATED ORAL at 02:21

## 2017-01-08 NOTE — PLAN OF CARE
Home care referral sent to [facility] for first time breastfeeding/early discharge.

## 2017-01-08 NOTE — PLAN OF CARE
Problem: Goal Outcome Summary  Goal: Goal Outcome Summary  Outcome: Improving  Data: Vital signs within normal limits, pt had one elevated BP but pt has been up walking in the hallways and in her room all shift.  Postpartum checks within normal limits - see flow record. Patient eating and drinking normally. Patient able to empty bladder independently and is up ambulating. No apparent signs of infection. Incision healing well. Patient performing self cares and is able to care for infant.  Action: Patient medicated during the shift for pain. See MAR. Patient reassessed within 1 hour after each medication and pain was improved - patient stated she was comfortable. Patient education done about hand expression, cord care for infant, and demonstrated how to give infant a bath.  Response: Positive attachment behaviors observed with infant. Support person, spouse: Tray, present.   Plan: Continue with the plan of care.

## 2017-01-08 NOTE — PLAN OF CARE
Problem: Goal Outcome Summary  Goal: Goal Outcome Summary  Outcome: Improving  Stable, has good pain control with her current pian regimen. Voiding freely and passing gas. Breastfeeding independently on demand and latch was verified. Will continue with plan of care.

## 2017-01-08 NOTE — PLAN OF CARE
Problem: Goal Outcome Summary  Goal: Goal Outcome Summary  Outcome: Adequate for Discharge Date Met:  01/08/17  Data: Vital signs within normal limits. Postpartum checks within normal limits - see flow record. Patient eating and drinking normally. Patient able to empty bladder independently and is up ambulating. No apparent signs of infection. Incision healing well. Patient performing self cares and is able to care for infant.  Action: Patient medicated during the shift for cramping. See MAR. Patient reassessed within 1 hour after each medication and pain was improved - patient stated she was comfortable. Patient education done about discharge instructions.   Response: Positive attachment behaviors observed with infant. Support persons Tray  present.   Plan: Discharge to home today with baby 1/8/2017

## 2017-01-08 NOTE — PLAN OF CARE
Problem: Goal Outcome Summary  Goal: Goal Outcome Summary  Outcome: Improving  Pt doing well. VSS. Postpartum checks WDL. Breastfeeding independently, pt needs some assistance with hand expression. Ambulating without difficulty. Passing gas, no BM. Pain has been adequately managed with Tylenol and Oxycodone. Continue with the plan of care.

## 2017-01-08 NOTE — PROGRESS NOTES
"S; Feeling good, no c/o.  Pain well controlled with po meds.  Tolerating a general diet.  Passing flatus.  Ambulating without difficulty.  Breastfeeding.  Urinating OK.  Would like d/c home today.    O: /81 mmHg  Pulse 75  Temp(Src) 98.6  F (37  C) (Oral)  Resp 16  Ht 1.609 m (5' 3.35\")  Wt 76 kg (167 lb 8.8 oz)  BMI 29.36 kg/m2  SpO2 100%  LMP 04/10/2016 (Approximate)  Breastfeeding? Unknown       Abd: SNT, mildly distended.  Appropriately TTP  Wound: incision C/D/I  Ex: no calf tenderness    Hgb: 9.7    A/P;  1) POD #2 S/P LTCS   Doing well, ok for d/c.  Instructions given.  Rx for ibuprofen, percocet, senna, feso4.  RTC 6 weeks.    SANTOS ABBASI MD    "

## 2017-01-08 NOTE — DISCHARGE INSTRUCTIONS

## 2017-01-09 ENCOUNTER — TELEPHONE (OUTPATIENT)
Dept: PEDIATRICS | Facility: CLINIC | Age: 32
End: 2017-01-09

## 2017-01-09 DIAGNOSIS — O92.70 LACTATION DISORDER: Primary | ICD-10-CM

## 2017-01-09 RX ORDER — MUPIROCIN 20 MG/G
OINTMENT TOPICAL 3 TIMES DAILY
Qty: 22 G | Refills: 1 | Status: SHIPPED | OUTPATIENT
Start: 2017-01-09 | End: 2017-01-14

## 2017-01-11 ENCOUNTER — DOCUMENTATION ONLY (OUTPATIENT)
Dept: CARE COORDINATION | Facility: CLINIC | Age: 32
End: 2017-01-11

## 2017-01-11 NOTE — PROGRESS NOTES
[facility] utilizes a different electronic medical record.  The documentation below has been copied from the home care chart following a Maternal Child Home Health visit.  DATE OF ASSESSMENT:   1/11/17                                                            DATE OF DELIVERY: 1/6/17  SAFETY CONCERNS/CAREGIVER SUPPORT: none, safe home, working smoke detector, supportive ,  family and friends are supportive with mom and baby care   OTHER CHILDREN IN HOME: no  TYPE OF DELIVERY : unscheduled c/section for fetal distress, incision clean, dry and intact.                                                                                                                                                                       OTHER PERTINENT DIAGNOSES: Maternal PKU  VITAL SIGNS                    BLOOD PRESSURE:  128/78                                                    HEART RATE:  92                    TEMPERATURE:   98                                                         RESPIRATION RATE: 16  PAIN: 3-4 out 10 incisional                         INTERVENTIONS: 600mg of ibuprofen every 6 hours  pain is 1-2 out of 10 after interventions  CARDIOPULMONARY:Normal cardiac rate and rhythm, clear breath sounds   GI ASSESSMENT: having bowel movement's, taking stool softners as needed   ASSESSMENT: voiding without difficulty, no s/s of infection  APPETITE: Stated she is eating regular meals and snacks.  BREAST ASSESSMENT: milk is in, Nipples were cracked not bleeding, she recieved Rx nipple cream from .  Discussed using soft shells and hyrdogel pads  Discussed breast massage, pumping and taking warm showers.       LACTATION:breasts feel full with milk, mildly tender, no engorgement at this times, spoke about engorgement prevention and mastitis.  No issues with latch.   Mom milk has come in, explained let down relflex and benefits of pumping after nursing. Pumping after feeds per personal preference, proper storage  of milk reviewed for counter, fridge, and freezer. Continues to take prenatal vitamin.  REPRODUCTIVE:      Knows to call provider for large clots or soaking through a pad in an hour                                                                                                              LOCHIA: pink               AMOUNT:  light-moderate  WOUND ASSESSMENT . C section:  incision clean, dry and intact.  No s/s of infection   NEUROLOGICAL: denies numbness, tingling.  Alert and oriented.    MUSCULOSKELETAL no issues, active range of motion in all extremities, discussed DVT prevention  SKIN negative for pigmentation, brusing, lesions, or rashes.  COPING SKILLS/MOOD/ANXIETY Postpartum mood disorders discussed, left screening sheet with patient to fill out. Patient states she feels well supported right now. Difference between postpartum blues, depression, and psychosis all discussed.  EMOTIONAL/SOCIAL/SPIRITUAL CONCERNS: none  EDUCATION PROVIDED see above remarks, [place] breastfeeding resource guide given with LC phone numbers, breastfeeding positions reviewed, [place] parson pamphlet, postpartum depression screening, proper hand hygeine/ infection control,  sleep patterns, perineum care.  Discussed supplementing infant after feedings and when to call the Dr.  Discusssed calling the  for s/s of infection or a temp 100.4 or greater.  Patient verbalized understanding   REFERRALS/PLAN OF CARE: Will call to set up her 6 week postpartum check with Dr. De La Paz  EMERGENCY PLAN call provider office with any questions or concerns. Mom knows OB and PEDS nurse triage line phone number for any questions or concerns. Agrees to call 911 for any life threatening emergency

## 2017-02-17 ENCOUNTER — PRENATAL OFFICE VISIT (OUTPATIENT)
Dept: OBGYN | Facility: CLINIC | Age: 32
End: 2017-02-17
Payer: COMMERCIAL

## 2017-02-17 VITALS
TEMPERATURE: 97.1 F | SYSTOLIC BLOOD PRESSURE: 107 MMHG | DIASTOLIC BLOOD PRESSURE: 73 MMHG | HEIGHT: 63 IN | BODY MASS INDEX: 25.96 KG/M2 | WEIGHT: 146.5 LBS | HEART RATE: 71 BPM

## 2017-02-17 DIAGNOSIS — N89.8 VAGINAL DISCHARGE: ICD-10-CM

## 2017-02-17 PROBLEM — Z36.89 ENCOUNTER FOR TRIAGE IN PREGNANT PATIENT: Status: RESOLVED | Noted: 2017-01-05 | Resolved: 2017-02-17

## 2017-02-17 PROBLEM — Z98.891 S/P PRIMARY LOW TRANSVERSE C-SECTION: Status: RESOLVED | Noted: 2017-01-06 | Resolved: 2017-02-17

## 2017-02-17 LAB
MICRO REPORT STATUS: NORMAL
SPECIMEN SOURCE: NORMAL
WET PREP SPEC: NORMAL

## 2017-02-17 PROCEDURE — 99207 ZZC POST PARTUM EXAM: CPT | Performed by: OBSTETRICS & GYNECOLOGY

## 2017-02-17 PROCEDURE — 87210 SMEAR WET MOUNT SALINE/INK: CPT | Performed by: OBSTETRICS & GYNECOLOGY

## 2017-02-17 PROCEDURE — G0145 SCR C/V CYTO,THINLAYER,RESCR: HCPCS | Performed by: OBSTETRICS & GYNECOLOGY

## 2017-02-17 NOTE — MR AVS SNAPSHOT
"              After Visit Summary   2/17/2017    Laila Cueva    MRN: [id number]           Patient Information     Date Of Birth          1985        Visit Information        Provider Department      2/17/2017 2:00 PM Erika De La Paz MD [facility]        Today's Diagnoses     Routine postpartum follow-up    -  1    Vaginal discharge           Follow-ups after your visit        Your next 10 appointments already scheduled     Mar 27, 2017 12:30 PM CDT   Return Metabolic Visit with Bishop Monroy MD   Peds Metabolism ([facility])    [facility]  [address]  [address] [facility] [address] [phone]              Who to contact     If you have questions or need follow up information about today's clinic visit or your schedule please contact [facility] directly at [phone].  Normal or non-critical lab and imaging results will be communicated to you by MyChart, letter or phone within 4 business days after the clinic has received the results. If you do not hear from us within 7 days, please contact the clinic through MyChart or phone. If you have a critical or abnormal lab result, we will notify you by phone as soon as possible.  Submit refill requests through [employer] or call your pharmacy and they will forward the refill request to us. Please allow 3 business days for your refill to be completed.          Additional Information About Your Visit        MyChart Information     [employer] lets you send messages to your doctor, view your test results, renew your prescriptions, schedule appointments and more. To sign up, go to [email or web address] . Click on \"Log in\" on the left side of the screen, which will take you to the Welcome page. Then click on \"Sign up Now\" on the right side of the page.     You will be asked to enter the access code listed below, as well as some personal information. Please follow the directions to create your " "username and password.     Your access code is: [email or web address]  Expires: 3/28/2017  4:02 PM     Your access code will  in 90 days. If you need help or a new code, please call your [facility] or [phone].        Care EveryWhere ID     This is your Care EveryWhere ID. This could be used by other organizations to access your [facility] medical records  [id number]        Your Vitals Were     Pulse Temperature Height Breastfeeding? BMI (Body Mass Index)       71 97.1  F (36.2  C) (Oral) 5' 3.46\" (1.612 m) Yes 25.58 kg/m2        Blood Pressure from Last 3 Encounters:   17 107/73   17 135/81   16 124/78    Weight from Last 3 Encounters:   17 146 lb 8 oz (66.5 kg)   17 167 lb 8.8 oz (76 kg)   16 167 lb 11.2 oz (76.1 kg)              We Performed the Following     Pap imaged thin layer screen reflex to HPV if ASCUS - recommend age 25 - 29     Wet prep        Primary Care Provider Office Phone # Fax #    Billie JEREMY Wilson [facility] [phone]       [address] [address]        Thank you!     Thank you for choosing [facility]  for your care. Our goal is always to provide you with excellent care. Hearing back from our patients is one way we can continue to improve our services. Please take a few minutes to complete the written survey that you may receive in the mail after your visit with us. Thank you!             Your Updated Medication List - Protect others around you: Learn how to safely use, store and throw away your medicines at www.disposemymeds.org.          This list is accurate as of: 17  2:47 PM.  Always use your most recent med list.                   Brand Name Dispense Instructions for use    ferrous sulfate 325 (65 FE) MG tablet    IRON    60 tablet    Take 1 tablet (325 mg) by mouth daily (with breakfast)       ibuprofen 600 MG tablet    ADVIL/MOTRIN    60 tablet    Take 1 tablet (600 " mg) by mouth every 6 hours as needed for moderate pain       PHENYLADE ESSENTIAL DRINK MIX Pack     62 packet    Take 2 Packages by mouth daily       prenatal multivitamin  plus iron 27-0.8 MG Tabs per tablet      Take 1 tablet by mouth daily       sapropterin dihydrochloride 100 MG Tbso    KUVAN    360 tablet    Take 12 tablets by mouth once daily with food for a total daily dose of 1200 mg. Take each dose with a meal.

## 2017-02-17 NOTE — NURSING NOTE
"Chief Complaint   Patient presents with     Post Partum Exam     post ob and pap       Initial /73  Pulse 71  Temp 97.1  F (36.2  C) (Oral)  Ht 5' 3.46\" (1.612 m)  Wt 146 lb 8 oz (66.5 kg)  Breastfeeding? Yes  BMI 25.58 kg/m2 Estimated body mass index is 25.58 kg/(m^2) as calculated from the following:    Height as of this encounter: 5' 3.46\" (1.612 m).    Weight as of this encounter: 146 lb 8 oz (66.5 kg).  BP completed using cuff size: regular        The following HM Due: pap smear      The following patient reported/Care Every where data was sent to:  P ABSTRACT QUALITY INITIATIVES [[id number]]       patient has appointment for today  Rebecca Moulton               "

## 2017-02-17 NOTE — LETTER
[place] [id number] [place] [address]  [phone]      February 22, 2017    Laila Cueva  [address] [place] [address]          Dear Laila,    I am happy to inform you that your recent cervical cancer screening test (PAP smear) was normal.      Preventative screening such as this helps insure your health for years to come.  This test should be repeated in 3 years unless otherwise directed.      You will still need to return to the clinic every year for your annual exam and other preventive tests.    Please contact the clinic if you have further questions.      Sincerely,    Erika De La Paz MD/[facility]

## 2017-02-17 NOTE — PROGRESS NOTES
Laila is here for a 6-week postpartum checkup.    She had a c/s for abnormal FHT of a viable boy, weight 8 pounds 12 oz., with no complications.  Since delivery, she has been breast feeding.  She has no signs of infection, bleeding or other complications.  She is not pregnant.  We discussed contraceptions and she has chosen possibly Mirena IUD and condoms.  She denies feeling sad, depressed or too overwhelmed.    PHQ-9 SCORE 12/13/2016   Total Score 3     She has noted some increased yellow vaginal d/c.    EXAM:    HEENT: grossly normal.  NECK: no lymphadenopathy or thyroidomegaly.  LUNGS: CTA X 2, no rales or crackles.  BACK: No spinal or CVA tenderness.  HEART: RRR without murmurs clicks or gallops.  ABDOMEN: soft, non tender, good bowel sounds, without masses rebound, guarding or tenderness. Incision well healed.    PELVIC:    External genitalia: normal without lesion.                            Vagina: normal mucosa and rugae, no discharge.  Cervix: multiparous, well healed, without lesion.  Uterus: non pregnant in size, firm , mobile, no lesions.  Adnexa: non tender, without masses    EXTREMITIES: Warm to touch, good pulses, no ankle edema or calf tenderness.  NEUROLOGIC: grossly normal.    ASSESSMENT:   Normal 6-week postpartum exam after c/s for abnormal FHT.    PLAN:  Pap smear done and condoms for contraception.  Wet prep negative. Handouts for IUD given and options discussed.  If she decides to go ahead with IUD, will rtc after 2 weeks for placement.    SANTOS ABBASI MD

## 2017-02-18 ASSESSMENT — PATIENT HEALTH QUESTIONNAIRE - PHQ9: SUM OF ALL RESPONSES TO PHQ QUESTIONS 1-9: 4

## 2017-02-21 LAB
COPATH REPORT: NORMAL
PAP: NORMAL

## 2017-03-23 DIAGNOSIS — E70.1 PHENYLKETONURIA (PKU) (H): ICD-10-CM

## 2017-03-23 PROCEDURE — 84510 ASSAY OF TYROSINE: CPT | Performed by: PEDIATRICS

## 2017-03-27 ENCOUNTER — OFFICE VISIT (OUTPATIENT)
Dept: PEDIATRICS | Facility: CLINIC | Age: 32
End: 2017-03-27

## 2017-03-27 ENCOUNTER — OFFICE VISIT (OUTPATIENT)
Dept: PEDIATRICS | Facility: CLINIC | Age: 32
End: 2017-03-27
Attending: GENETIC COUNSELOR, MS
Payer: COMMERCIAL

## 2017-03-27 ENCOUNTER — ALLIED HEALTH/NURSE VISIT (OUTPATIENT)
Dept: PEDIATRICS | Facility: CLINIC | Age: 32
End: 2017-03-27
Attending: DIETITIAN, REGISTERED
Payer: COMMERCIAL

## 2017-03-27 ENCOUNTER — OFFICE VISIT (OUTPATIENT)
Dept: PEDIATRICS | Facility: CLINIC | Age: 32
End: 2017-03-27
Attending: PEDIATRICS
Payer: COMMERCIAL

## 2017-03-27 VITALS
HEIGHT: 63 IN | BODY MASS INDEX: 24.92 KG/M2 | DIASTOLIC BLOOD PRESSURE: 64 MMHG | HEART RATE: 79 BPM | WEIGHT: 140.65 LBS | SYSTOLIC BLOOD PRESSURE: 106 MMHG

## 2017-03-27 DIAGNOSIS — E70.1 PHENYLKETONURIA (PKU) (H): Primary | ICD-10-CM

## 2017-03-27 DIAGNOSIS — E70.1 PHENYLKETONURIA (PKU) (H): ICD-10-CM

## 2017-03-27 PROCEDURE — 99212 OFFICE O/P EST SF 10 MIN: CPT | Mod: ZF

## 2017-03-27 PROCEDURE — 97803 MED NUTRITION INDIV SUBSEQ: CPT | Mod: ZF | Performed by: DIETITIAN, REGISTERED

## 2017-03-27 PROCEDURE — 40000072 ZZH STATISTIC GENETIC COUNSELING, < 16 MIN: Mod: ZF | Performed by: GENETIC COUNSELOR, MS

## 2017-03-27 ASSESSMENT — PAIN SCALES - GENERAL: PAINLEVEL: NO PAIN (0)

## 2017-03-27 NOTE — LETTER
3/27/2017      RE: Laila Cueva  [address] [place] [address]                 [facility]  [facility] [address]  [address] [facility] [address]  Phone: [phone]  Fax: [phone]  Date: 2017      Patient:  Laila Cueva   :   1985   MRN:     [id number]      Laila Cueva  [address] [place] [address]    Dear Dr. Mathesw  and Laila Cueva,    CHIEF COMPLAINT:     I had the pleasure of seeing Laila Cueva in the PKU and Maternal PKU Clinic at the [facility] regarding phenylketonuria (PKU). This patient is a 31 year old and comes for evaluation and treatment.      Since the last visit, there have been no hospitalizations, emergency department visits, or other major changes in medical care.    PAST MEDICAL HISTORY:    These list of past medical problems includes:    Patient Active Problem List   Diagnosis     Acne     CARDIOVASCULAR SCREENING; LDL GOAL LESS THAN 160     Dermatofibroma of left lower leg     Phenylketonuria (PKU) (H)     Von Willebrand disease (H)     Laila has done extremely well, maintaining blood phenylalanine levels of 2 - 4 mg/dl consistently.    FAMILY HISTORY: A brief family medical history was reviewed.  MEDICATIONS:   Current Outpatient Prescriptions   Medication Sig     Nutritional Supplements (PHENYLADE ESSENTIAL DRINK MIX) PACK Take 2 Packages by mouth daily     sapropterin dihydrochloride (KUVAN) 100 MG TBSO Take 12 tablets by mouth once daily with food for a total daily dose of 1200 mg. Take each dose with a meal.     Prenatal Vit-Fe Fumarate-FA (PRENATAL MULTIVITAMIN  PLUS IRON) 27-0.8 MG TABS Take 1 tablet by mouth daily     ibuprofen (ADVIL/MOTRIN) 600 MG tablet Take 1 tablet (600 mg) by mouth every 6 hours as needed for moderate pain (Patient not taking: Reported on 3/27/2017)     ferrous sulfate (IRON) 325 (65 FE) MG tablet Take 1 tablet (325 mg) by mouth daily (with breakfast) (Patient not taking:  "Reported on 3/27/2017)     No current facility-administered medications for this visit.      Facility-Administered Medications Ordered in Other Visits   Medication     lidocaine-EPINEPHrine 1.5 %-1:234187 injection     bupivacaine (MARCAINE) 0.125 % injection (diluted from stock concentration by MD or CRNA)     REVIEW OF SYSTEMS: The review of systems negative for new eye, ear, heart, lung, liver, spleen, gastrointestinal, bone, muscle, integumentary, endocrinologic, brain or psychiatric issues except as noted above.  PHYSICAL EXAMINATION:  Demographics: /64  Pulse 79  Ht 5' 3.19\" (160.5 cm)  Wt 140 lb 10.5 oz (63.8 kg)  BMI 24.77 kg/m2  General: The patient is oriented to person, place and time at an age-appropriate manner.   HEENT: The facial features are normal and symmetric.   The gaze is conjugate and extraocular motions are full and intact.The pupils are equal, round and reactive to light.  The ears are of normal position and configuration and hearing is grossly normal.  The oropharynx is benign and the tongue protrudes normally without fasciculations.  Neck: The neck is supple with full range of motion  Chest: The chest is of normal configuration and clear by auscultation.   Heart: A normal S1 and S2 are heard without murmurs or gallops.  Abdomen: The abdomen is soft and benign without organomegaly.   Extremities: The extremities are of normal configuration without contractures nor hyperlaxities. Strength and tone appear to be normal and symmetric. Deep tendon reflexes are normal at the biceps, patellar and ankles, and there is no clonus at the ankles.   Integument: The integument is  of normal appearance without significant changes in pigmentation, birthmarks, or lesions.    LABORATORY RESULTS: Previous studies showed pathologically elevated blood phenylalanine levels as well as specific PAH mutatons and excluded disorders of biopterin recycling. Laboratory studies from the past year were " "reviewed.    ASSESSMENT:  1.) Phenylketonuria (PKU).  2.) Excellent control with blood phe levels aiming at levels of 6 mg/dL or lower. Blood phe levels at 2-4 mg/dl since last visit.    PLAN/RECOMMENDATIONS:    1.) Send blood \"tyrosine and phenylalanine\" levels monthly.  2.) Metabolic PKU Dietician Consult today for regular diet assessment and nutritional management including the patient's prescribed phenylalanine intake.  3.) Clinical Pharmacotherapy consultation with Rosemarie Alvares, AnitaD, regarding sapropterin dihydrochloride medication for PKU  4.) Continue low phenylalanine diet (250-400 mg phenylalanine/day, or as modified by consultation with the Metabolic PKU Dietician considering recent blood phenylalanine levels, diet history, and impact of sapropterin, if taken).  5..) Return to PKU Clinic in 12 months.    FOLLOW-UP PLAN:  If you are returning to clinic to review specific laboratory tests, please call the Genetic Counselor (see phone numbers below) to confirm that we have received all of the results from reference laboratories prior to your appointment. If we have not received all of the test results, please discuss re-scheduling your appointment.    I spent 30 minutes face-to-face with the patient reviewing the chief complaint, past medical history, and obtaining a review of systems as well as doing a physical examination; more than 50% of this time was spent in counseling and education regarding Maternal PKU,  the availability of the local patient support group with information on the [place] PKU Foundation available at www.mnpku.org as well as the importance of sending in regular blood specimens to monitor phe level, and the importance of adhering to therapies that maintain phe levels fo 6 mg/dl or lower.    With warmest regards,     Quan Monroy PhD MD  Professor of Pediatrics, and  [employer]    Appointments: [phone]      Monday: [facility] for Lysosomal " "Diseases Clinic   Tuesday: Metabolism and Genetics Clinic   Thursday: PKU Clinic    Gay Herbert, Registered Dietician: [phone]  Patricia Ayers MS, CGC (Test Results): [phone]  Gay Herbert, Registered Dietician: [phone]  Rosemarie Alvares, PharmD, Pharmacotherapy for Metabolic Disorders (PIMD) Consultant: [phone]  Alexi \"HERMINIO\" Anita CookD, Pharmacotherapy for Metabolic Disorders (PIMD) Consultant: [phone]  SHANNON Mccormack, LICSW,ACM, Clinical , [phone]      Copy to Primary Care Practitioner:  Billie Mathews  [facility] [address] [facility] [address]    Copy  to patient:  NOEL ZIMMERMAN   [address] [place] [address]        "

## 2017-03-27 NOTE — LETTER
3/27/2017    RE: Laila Cueva  [address] [place] [address]     Pharmacotherapy Consultation:     S/O: Laila Cueva is a 31 year old female coming in to clinic for a follow-up visit (evaluation and treatment) of her PKU condition. The patient's last visit to the PKU clinic was on 9/27/2016.      Assessment:     PKU -   PKU Genotype: NA     Laila determined to be Kuvan responder and was on Kuvan therapy for PKU, but discontinued Kuvan during her pregnancy until 13 to 15 weeks of her pregnancy (She said it was around June or July of last year.) After that, she said her phe level went up and it was hard to keep on PKU diet, so she decided to continue her Kuvan regimen with 12 tablets per day. Since then, Laila was on 12 tablets of Kuvan per day. She remembers take Kuvan when she eats, but sometimes forget to take Kuvan. She mentioned that she misses 1 or 2 tablets of Kuvan (in average) in a week. Laila prefers take whole tablet of Kuvan over powder form because she had stomach problem when she crushed tablets to take in the past.     Past phe levels were:  12/2016: 2.5  5/2016: 5.8  9/2015: 6.2  8/2015: 5.8    Laila said she just did her blood phe test on last Thursday.     Other Medications:  Only medications she takes regularly are Kuvan and prenatal vitamins.   Laila took Percocet and ibuprofen for pain after her delivery in January, but no longer taking it.     Pharmacotherapy Plan:  1) Increase Kuvan dose from 12 tablets to 13 tablets per day based on her weight today (20mg/kg x 63.8kg = 1276 mg ~ 1300mg of Kuvan)  2) Continue working with [facility] PKU Dieticians to maintain low-phe diet and help stabilize phe levels within the goal range of 2 - 6 mg/dl.   3) Send in blood spots for monitoring phe levels regularly       Sowmya Cook, AnitaD Postdoctoral Fellow  Pharmacotherapy of Inherited Metabolic Disorders (PIMD)     Pharmacotherapy consultation per collaborative  practice agreement with Dr. Bishop Monroy MD PhD     Sowmya Cook, [facility]

## 2017-03-27 NOTE — NURSING NOTE
"Chief Complaint   Patient presents with     RECHECK     follow up       Initial /64  Pulse 79  Ht 5' 3.19\" (160.5 cm)  Wt 140 lb 10.5 oz (63.8 kg)  BMI 24.77 kg/m2 Estimated body mass index is 24.77 kg/(m^2) as calculated from the following:    Height as of this encounter: 5' 3.19\" (160.5 cm).    Weight as of this encounter: 140 lb 10.5 oz (63.8 kg).  Medication Reconciliation: complete     Alvaro Colin LPN      "

## 2017-03-27 NOTE — PROGRESS NOTES
Pharmacotherapy Consultation:     S/O: Laila Cueva is a 31 year old female coming in to clinic for a follow-up visit (evaluation and treatment) of her PKU condition. The patient's last visit to the PKU clinic was on 9/27/2016.      Assessment:     PKU -   PKU Genotype: SLOANE Ulloa determined to be Kuvan responder and was on Kuvan therapy for PKU, but discontinued Kuvan during her pregnancy until 13 to 15 weeks of her pregnancy (She said it was around June or July of last year.) After that, she said her phe level went up and it was hard to keep on PKU diet, so she decided to continue her Kuvan regimen with 12 tablets per day. Since then, Laila was on 12 tablets of Kuvan per day. She remembers take Kuvan when she eats, but sometimes forget to take Kuvan. She mentioned that she misses 1 or 2 tablets of Kuvan (in average) in a week. Laila prefers take whole tablet of Kuvan over powder form because she had stomach problem when she crushed tablets to take in the past.     Past phe levels were:  12/2016: 2.5  5/2016: 5.8  9/2015: 6.2  8/2015: 5.8    Laila said she just did her blood phe test on last Thursday.     Other Medications:  Only medications she takes regularly are Kuvan and prenatal vitamins.   Laila took Percocet and ibuprofen for pain after her delivery in January, but no longer taking it.     Pharmacotherapy Plan:  1) Increase Kuvan dose from 12 tablets to 13 tablets per day based on her weight today (20mg/kg x 63.8kg = 1276 mg ~ 1300mg of Kuvan)  2) Continue working with [facility] PKU Dieticians to maintain low-phe diet and help stabilize phe levels within the goal range of 2 - 6 mg/dl.   3) Send in blood spots for monitoring phe levels regularly         Sowmya Cook, AnitaD Postdoctoral Fellow  Pharmacotherapy of Inherited Metabolic Disorders (PIMD)     Pharmacotherapy consultation per collaborative practice agreement with Dr. Bishop Monroy MD PhD

## 2017-03-27 NOTE — MR AVS SNAPSHOT
After Visit Summary   3/27/2017    Laila Cueva    MRN: [id number]           Patient Information     Date Of Birth          1985        Visit Information        Provider Department      3/27/2017 1:00 PM Patricia Ayers GC Peds Metabolism        Today's Diagnoses     Encounter for genetic counseling    -  1    Phenylketonuria (PKU) (H)           Follow-ups after your visit        Who to contact     Please call your clinic at [phone] to:    Ask questions about your health    Make or cancel appointments    Discuss your medicines    Learn about your test results    Speak to your doctor   If you have compliments or concerns about an experience at your clinic, or if you wish to file a complaint, please contact [facility] Patient Relations at [phone] or email us at [email or web address]         Additional Information About Your Visit        MyChart Information     [employer] is an electronic gateway that provides easy, online access to your medical records. With [email or web address], you can request a clinic appointment, read your test results, renew a prescription or communicate with your care team.     To sign up for [employer] visit the website at [email or web address]   You will be asked to enter the access code listed below, as well as some personal information. Please follow the directions to create your username and password.     Your access code is: [email or web address]  Expires: 2017 11:27 AM     Your access code will  in 90 days. If you need help or a new code, please contact your [facility] or call [phone] for assistance.        Care EveryWhere ID     This is your Care EveryWhere ID. This could be used by other organizations to access your [place] medical records  [id number]         Blood Pressure from Last 3 Encounters:   17 106/64   17 107/73   17 135/81    Weight from Last 3 Encounters:    03/27/17 140 lb 10.5 oz (63.8 kg)   02/17/17 146 lb 8 oz (66.5 kg)   01/05/17 167 lb 8.8 oz (76 kg)              Today, you had the following     No orders found for display         Today's Medication Changes          These changes are accurate as of: 3/27/17 11:59 PM.  If you have any questions, ask your nurse or doctor.               These medicines have changed or have updated prescriptions.        Dose/Directions    sapropterin dihydrochloride 100 MG Tbso   Commonly known as:  KUVAN   This may have changed:  additional instructions   Used for:  Phenylketonuria (PKU) (H)   Changed by:  Sowmya Cook RPH        Take 13 tablets by mouth once daily with food.   Quantity:  360 tablet   Refills:  11            Where to get your medicines      Some of these will need a paper prescription and others can be bought over the counter.  Ask your nurse if you have questions.     You don't need a prescription for these medications     sapropterin dihydrochloride 100 MG Tbso                Primary Care Provider Office Phone # Fax #    Billie JEREMY [facility] [phone]       [address] [address]        Thank you!     Thank you for choosing [facility]  for your care. Our goal is always to provide you with excellent care. Hearing back from our patients is one way we can continue to improve our services. Please take a few minutes to complete the written survey that you may receive in the mail after your visit with us. Thank you!             Your Updated Medication List - Protect others around you: Learn how to safely use, store and throw away your medicines at www.disposemymeds.org.          This list is accurate as of: 3/27/17 11:59 PM.  Always use your most recent med list.                   Brand Name Dispense Instructions for use    ferrous sulfate 325 (65 FE) MG tablet    IRON    60 tablet    Take 1 tablet (325 mg) by mouth daily (with breakfast)        ibuprofen 600 MG tablet    ADVIL/MOTRIN    60 tablet    Take 1 tablet (600 mg) by mouth every 6 hours as needed for moderate pain       PHENYLADE ESSENTIAL DRINK MIX Pack     62 packet    Take 2 Packages by mouth daily       prenatal multivitamin  plus iron 27-0.8 MG Tabs per tablet      Take 1 tablet by mouth daily       sapropterin dihydrochloride 100 MG Tbso    KUVAN    360 tablet    Take 13 tablets by mouth once daily with food.

## 2017-03-27 NOTE — PROGRESS NOTES
Advanced [facility]  [facility] [address] [facility] [address]  Phone: [phone]  Fax: [phone]  Date: 2017      Patient:  Laila Cueva   :   1985   MRN:     [id number]      Laila Cueva  [address] [place] [address]    Dear Dr. Mathews  and Laila Cueva,    CHIEF COMPLAINT:     I had the pleasure of seeing Laila Cueva in the PKU and Maternal PKU Clinic at the [facility] regarding phenylketonuria (PKU). This patient is a 31 year old and comes for evaluation and treatment.      Since the last visit, there have been no hospitalizations, emergency department visits, or other major changes in medical care.    PAST MEDICAL HISTORY:    These list of past medical problems includes:    Patient Active Problem List   Diagnosis     Acne     CARDIOVASCULAR SCREENING; LDL GOAL LESS THAN 160     Dermatofibroma of left lower leg     Phenylketonuria (PKU) (H)     Von Willebrand disease (H)     Laila has done extremely well, maintaining blood phenylalanine levels of 2 - 4 mg/dl consistently.    FAMILY HISTORY: A brief family medical history was reviewed.  MEDICATIONS:   Current Outpatient Prescriptions   Medication Sig     Nutritional Supplements (PHENYLADE ESSENTIAL DRINK MIX) PACK Take 2 Packages by mouth daily     sapropterin dihydrochloride (KUVAN) 100 MG TBSO Take 12 tablets by mouth once daily with food for a total daily dose of 1200 mg. Take each dose with a meal.     Prenatal Vit-Fe Fumarate-FA (PRENATAL MULTIVITAMIN  PLUS IRON) 27-0.8 MG TABS Take 1 tablet by mouth daily     ibuprofen (ADVIL/MOTRIN) 600 MG tablet Take 1 tablet (600 mg) by mouth every 6 hours as needed for moderate pain (Patient not taking: Reported on 3/27/2017)     ferrous sulfate (IRON) 325 (65 FE) MG tablet Take 1 tablet (325 mg) by mouth daily (with breakfast) (Patient not taking: Reported on 3/27/2017)     No current facility-administered medications for this visit.   "    Facility-Administered Medications Ordered in Other Visits   Medication     lidocaine-EPINEPHrine 1.5 %-1:081419 injection     bupivacaine (MARCAINE) 0.125 % injection (diluted from stock concentration by MD or CRNA)     REVIEW OF SYSTEMS: The review of systems negative for new eye, ear, heart, lung, liver, spleen, gastrointestinal, bone, muscle, integumentary, endocrinologic, brain or psychiatric issues except as noted above.  PHYSICAL EXAMINATION:  Demographics: /64  Pulse 79  Ht 5' 3.19\" (160.5 cm)  Wt 140 lb 10.5 oz (63.8 kg)  BMI 24.77 kg/m2  General: The patient is oriented to person, place and time at an age-appropriate manner.   HEENT: The facial features are normal and symmetric.   The gaze is conjugate and extraocular motions are full and intact.The pupils are equal, round and reactive to light.  The ears are of normal position and configuration and hearing is grossly normal.  The oropharynx is benign and the tongue protrudes normally without fasciculations.  Neck: The neck is supple with full range of motion  Chest: The chest is of normal configuration and clear by auscultation.   Heart: A normal S1 and S2 are heard without murmurs or gallops.  Abdomen: The abdomen is soft and benign without organomegaly.   Extremities: The extremities are of normal configuration without contractures nor hyperlaxities. Strength and tone appear to be normal and symmetric. Deep tendon reflexes are normal at the biceps, patellar and ankles, and there is no clonus at the ankles.   Integument: The integument is  of normal appearance without significant changes in pigmentation, birthmarks, or lesions.    LABORATORY RESULTS: Previous studies showed pathologically elevated blood phenylalanine levels as well as specific PAH mutatons and excluded disorders of biopterin recycling. Laboratory studies from the past year were reviewed.    ASSESSMENT:  1.) Phenylketonuria (PKU).  2.) Excellent control with blood phe levels " "aiming at levels of 6 mg/dL or lower. Blood phe levels at 2-4 mg/dl since last visit.    PLAN/RECOMMENDATIONS:    1.) Send blood \"tyrosine and phenylalanine\" levels monthly.  2.) Metabolic PKU Dietician Consult today for regular diet assessment and nutritional management including the patient's prescribed phenylalanine intake.  3.) Clinical Pharmacotherapy consultation with Rosemarie Alvares, AnitaD, regarding sapropterin dihydrochloride medication for PKU  4.) Continue low phenylalanine diet (250-400 mg phenylalanine/day, or as modified by consultation with the Metabolic PKU Dietician considering recent blood phenylalanine levels, diet history, and impact of sapropterin, if taken).  5..) Return to PKU Clinic in 12 months.    FOLLOW-UP PLAN:  If you are returning to clinic to review specific laboratory tests, please call the Genetic Counselor (see phone numbers below) to confirm that we have received all of the results from reference laboratories prior to your appointment. If we have not received all of the test results, please discuss re-scheduling your appointment.    I spent 30 minutes face-to-face with the patient reviewing the chief complaint, past medical history, and obtaining a review of systems as well as doing a physical examination; more than 50% of this time was spent in counseling and education regarding Maternal PKU,  the availability of the local patient support group with information on the [place] PKU Foundation available at www.mnpku.org as well as the importance of sending in regular blood specimens to monitor phe level, and the importance of adhering to therapies that maintain phe levels fo 6 mg/dl or lower.    With warmest regards,     Quan Monroy PhD MD  Professor of Pediatrics, and  [employer]    Appointments: [phone]      Monday: [facility] for Lysosomal Diseases Clinic   Tuesday: Metabolism and Genetics Clinic   Thursday: PKU Clinic    Gay Herbert, " "Registered Dietician: [phone]  Patricia Ayers MS, [facility] (Test Results): [phone]  Gay Herbert, Registered Dietician: [phone]  Rosemarie Alvares, PharmD, Pharmacotherapy for Metabolic Disorders (PIMD) Consultant: [phone]  Alexi \"HERMINIO\" Anita CookD, Pharmacotherapy for Metabolic Disorders (PIMD) Consultant: [phone]  SHANNON Mccormack, [facility],ACM, Clinical , [phone]      Copy to Primary Care Practitioner:  Billie Mathews  [facility] [address] [place] [address]    Copy  to patient:  NOEL ZIMMERMAN   [address] [place] [address]    "

## 2017-03-27 NOTE — LETTER
3/27/2017      RE: Laila Cueva  [address] [place] [address]       Presenting Information:  Laila Cueva is a 31-year-old female with phenylketonuria (PKU).  Her genotype is unknown.  Laila returned to clinic today for a follow-up appointment with Dr. Quan Monroy.  She was accompanied by her  Tray and her new son, Leroy.  I met with the family at the request of Dr. Monroy to review the options for genetic testing for the family.    Discussion:  I had previously met with Laila Feliz in 2015, and several of my genetic counseling colleagues had met with the couple during Laila's recent pregnancy.  They are therefore very familiar with the genetics and inheritance of PKU.  We reviewed that because Laila has PKU, her son Leroy is an obligate carrier for PKU.     We re-addressed the option of genetic testing for Laila and Tray.  For Laila this is important because it would allow for other family members including Leroy to have genetic carrier testing to determine which mutation he carries.  Genetic testing for Tray would be valuable to determine if the couple has a chance to have a child with PKU.  If Tray is a carrier, any child the couple has would have a 50% chance to have PKU.  Depending on the mutations, a child may be at risk to have more severe PKU than what Laila has experienced.  Genetic testing is also necessary if the couple wished to pursue any reproductive options like pre-implantation.  The family declined genetic testing today but it remains an option in the future.      Laila and Tray were also counseled throughout the pregnancy about the risks of maternal PKU.  Fortunately Laila's levels were under good control throughout the pregnancy with the help of Lynnette.  A fetal echocardiogram was performed during the pregnancy which was reportedly normal.  Their son Leroy was born and had a normal [place]  screen.  He is not  microcephalic and per the family's report, is doing well.      At the conclusion of our visit my contact information was shared.  They were encouraged to contact me with any additional questions and if they would like to pursue genetic testing.    Plan:  1.  Genetic testing for Laila and Tray continues to be declined but it remains an option in the future  2.  Follow-up as recommended by Dr. Monroy and Gay Ayers [facility]  Genetic Counselor  Division of Genetics and Metabolism    Total time spent in consultation with the family was approximately 15 minutes    Cc: No letter      Patricia Ayers GC

## 2017-03-27 NOTE — LETTER
Date:April 4, 2017      Provider requested that no letter be sent. Do not send.       [facility] Health Information

## 2017-03-27 NOTE — MR AVS SNAPSHOT
MRN:[id number]                      After Visit Summary   3/27/2017    Laila Cueva    MRN: [id number]           Visit Information        Provider Department      3/27/2017 2:00 PM Gay Lopez RD Peds Metabolism        MyChart Information     [employer] is an electronic gateway that provides easy, online access to your medical records. With Soonr, you can request a clinic appointment, read your test results, renew a prescription or communicate with your care team.     To sign up for Soonr visit the website at [email or web address]   You will be asked to enter the access code listed below, as well as some personal information. Please follow the directions to create your username and password.     Your access code is: [email or web address]  Expires: 2017 11:27 AM     Your access code will  in 90 days. If you need help or a new code, please contact your [facility] or call [phone] for assistance.        Care EveryWhere ID     This is your Care EveryWhere ID. This could be used by other organizations to access your [place] medical records  [id number]

## 2017-03-27 NOTE — MR AVS SNAPSHOT
After Visit Summary   3/27/2017    Laila Cueva    MRN: [id number]           Patient Information     Date Of Birth          1985        Visit Information        Provider Department      3/27/2017 12:30 PM Bishop Monroy MD Peds Metabolism        Today's Diagnoses     Phenylketonuria (PKU) (H)    -  1       Follow-ups after your visit        Follow-up notes from your care team     Return in about 1 year (around 3/27/2018).      Who to contact     Please call your clinic at [phone] to:    Ask questions about your health    Make or cancel appointments    Discuss your medicines    Learn about your test results    Speak to your doctor   If you have compliments or concerns about an experience at your clinic, or if you wish to file a complaint, please contact [facility] Patient Relations at [phone] or email us at [email or web address]         Additional Information About Your Visit        MyChart Information     [employer] is an electronic gateway that provides easy, online access to your medical records. With Apps4Pro, you can request a clinic appointment, read your test results, renew a prescription or communicate with your care team.     To sign up for [employer] visit the website at [email or web address]   You will be asked to enter the access code listed below, as well as some personal information. Please follow the directions to create your username and password.     Your access code is: [email or web address]  Expires: 2017 11:27 AM     Your access code will  in 90 days. If you need help or a new code, please contact your [facility] or call [phone] for assistance.        Care EveryWhere ID     This is your Care EveryWhere ID. This could be used by other organizations to access your [place] medical records  [id number]        Your Vitals Were     Pulse Height BMI (Body Mass Index)             79 1.605 m  "(5' 3.19\") 24.77 kg/m2          Blood Pressure from Last 3 Encounters:   No data found for BP    Weight from Last 3 Encounters:   No data found for Wt              Today, you had the following     No orders found for display         Today's Medication Changes          These changes are accurate as of: 3/27/17 11:59 PM.  If you have any questions, ask your nurse or doctor.               These medicines have changed or have updated prescriptions.        Dose/Directions    sapropterin dihydrochloride 100 MG Tbso   Commonly known as:  KUVAN   This may have changed:  additional instructions   Used for:  Phenylketonuria (PKU) (H)   Changed by:  Sowmya Cook RPH        Take 13 tablets by mouth once daily with food.   Quantity:  360 tablet   Refills:  11            Where to get your medicines      Some of these will need a paper prescription and others can be bought over the counter.  Ask your nurse if you have questions.     You don't need a prescription for these medications     sapropterin dihydrochloride 100 MG Tbso                Primary Care Provider Office Phone # Fax #    Billie JEREMY [facility] [phone]       [address] [address]        Thank you!     Thank you for choosing PEDS METABOLISM  for your care. Our goal is always to provide you with excellent care. Hearing back from our patients is one way we can continue to improve our services. Please take a few minutes to complete the written survey that you may receive in the mail after your visit with us. Thank you!             Your Updated Medication List - Protect others around you: Learn how to safely use, store and throw away your medicines at www.disposemymeds.org.          This list is accurate as of: 3/27/17 11:59 PM.  Always use your most recent med list.                   Brand Name Dispense Instructions for use    ferrous sulfate 325 (65 FE) MG tablet    IRON    60 tablet    Take 1 " tablet (325 mg) by mouth daily (with breakfast)       ibuprofen 600 MG tablet    ADVIL/MOTRIN    60 tablet    Take 1 tablet (600 mg) by mouth every 6 hours as needed for moderate pain       PHENYLADE ESSENTIAL DRINK MIX Pack     62 packet    Take 2 Packages by mouth daily       prenatal multivitamin  plus iron 27-0.8 MG Tabs per tablet      Take 1 tablet by mouth daily       sapropterin dihydrochloride 100 MG Tbso    KUVAN    360 tablet    Take 13 tablets by mouth once daily with food.

## 2017-03-27 NOTE — MR AVS SNAPSHOT
After Visit Summary   3/27/2017    Laila Cueva    MRN: [id number]           Patient Information     Date Of Birth          1985        Visit Information        Provider Department      3/27/2017 3:59 PM Sowmya Cook RPH Peds PKU        Today's Diagnoses     Phenylketonuria (PKU) (H)    -  1       Follow-ups after your visit        Who to contact     Please call your clinic at [phone] to:    Ask questions about your health    Make or cancel appointments    Discuss your medicines    Learn about your test results    Speak to your doctor   If you have compliments or concerns about an experience at your clinic, or if you wish to file a complaint, please contact [facility] Patient Relations at [phone] or email us at [email or web address]         Additional Information About Your Visit        MyChart Information     myThingst is an electronic gateway that provides easy, online access to your medical records. With [employer], you can request a clinic appointment, read your test results, renew a prescription or communicate with your care team.     To sign up for myThingst visit the website at [email or web address]   You will be asked to enter the access code listed below, as well as some personal information. Please follow the directions to create your username and password.     Your access code is: [email or web address]  Expires: 3/28/2017  5:02 PM     Your access code will  in 90 days. If you need help or a new code, please contact your [facility] or call [phone] for assistance.        Care EveryWhere ID     This is your Care EveryWhere ID. This could be used by other organizations to access your [place] medical records  [id number]         Blood Pressure from Last 3 Encounters:   17 106/64   17 107/73   17 135/81    Weight from Last 3 Encounters:   17 140 lb 10.5 oz (63.8 kg)   17 146  lb 8 oz (66.5 kg)   01/05/17 167 lb 8.8 oz (76 kg)              Today, you had the following     No orders found for display       Primary Care Provider Office Phone # Fax #    JEREMY Alberts ALONDRA [phone]       [facility] [address] [facility]  [facility] [address]        Thank you!     Thank you for choosing PEDS PKU  for your care. Our goal is always to provide you with excellent care. Hearing back from our patients is one way we can continue to improve our services. Please take a few minutes to complete the written survey that you may receive in the mail after your visit with us. Thank you!             Your Updated Medication List - Protect others around you: Learn how to safely use, store and throw away your medicines at www.disposemymeds.org.          This list is accurate as of: 3/27/17  4:02 PM.  Always use your most recent med list.                   Brand Name Dispense Instructions for use    ferrous sulfate 325 (65 FE) MG tablet    IRON    60 tablet    Take 1 tablet (325 mg) by mouth daily (with breakfast)       ibuprofen 600 MG tablet    ADVIL/MOTRIN    60 tablet    Take 1 tablet (600 mg) by mouth every 6 hours as needed for moderate pain       PHENYLADE ESSENTIAL DRINK MIX Pack     62 packet    Take 2 Packages by mouth daily       prenatal multivitamin  plus iron 27-0.8 MG Tabs per tablet      Take 1 tablet by mouth daily       sapropterin dihydrochloride 100 MG Tbso    KUVAN    360 tablet    Take 12 tablets by mouth once daily with food for a total daily dose of 1200 mg. Take each dose with a meal.

## 2017-03-28 NOTE — PROGRESS NOTES
Presenting Information:  Laila Cueva is a 31-year-old female with phenylketonuria (PKU).  Her genotype is unknown.  Laila returned to clinic today for a follow-up appointment with Dr. Quan Monroy.  She was accompanied by her  Tray and her new son, Leroy.  I met with the family at the request of Dr. Monroy to review the options for genetic testing for the family.    Discussion:  I had previously met with Laila Feliz in 2015, and several of my genetic counseling colleagues had met with the couple during Laila's recent pregnancy.  They are therefore very familiar with the genetics and inheritance of PKU.  We reviewed that because Laila has PKU, her son Leroy is an obligate carrier for PKU.     We re-addressed the option of genetic testing for Laila and Tray.  For Laila this is important because it would allow for other family members including Leroy to have genetic carrier testing to determine which mutation he carries.  Genetic testing for Tray would be valuable to determine if the couple has a chance to have a child with PKU.  If Tray is a carrier, any child the couple has would have a 50% chance to have PKU.  Depending on the mutations, a child may be at risk to have more severe PKU than what Laila has experienced.  Genetic testing is also necessary if the couple wished to pursue any reproductive options like pre-implantation.  The family declined genetic testing today but it remains an option in the future.      Laila and Tray were also counseled throughout the pregnancy about the risks of maternal PKU.  Fortunately Laila's levels were under good control throughout the pregnancy with the help of Lynnette.  A fetal echocardiogram was performed during the pregnancy which was reportedly normal.  Their son Leryo was born and had a normal [place]  screen.  He is not microcephalic and per the family's report, is doing well.      At the conclusion of our visit my  contact information was shared.  They were encouraged to contact me with any additional questions and if they would like to pursue genetic testing.    Plan:  1.  Genetic testing for Laila and Tray continues to be declined but it remains an option in the future  2.  Follow-up as recommended by Dr. Monroy and Gay Ayers [facility]  Genetic Counselor  Division of Genetics and Metabolism    Total time spent in consultation with the family was approximately 15 minutes    Cc: No letter

## 2017-03-31 NOTE — PROGRESS NOTES
CLINICAL NUTRITION SERVICES - PEDIATRIC ASSESSMENT NOTE     REASON FOR ASSESSMENT  Laila Cueva is a 31 year old female seen by the dietitian for consult regarding mild PKU/pregnancy.     ANTHROPOMETRICS  Height: 160.5 cm  Weight: 63.8 kg  BMI: 24.8     Pre-pregnancy BMI: 25.5     NUTRITION HISTORY  Patient is between 30-45 grams protein/day     Patient gave birth in early January.  She has continued on her diet and Kuvan since.  Usual intake remains:     Breakfast: Raisin bran, dry  Snack: 1-2 slices butter/jelly on white bread  Lunch: BLT (petersen, lettuce, tomato sandwich), Tostitos chips and salsa  Dinner: Pasta with pesto sauce, root beer  Snack: fruit such as apples or oranges     She has also done stuffed shells, egg on white bread, baked beans, salad, ice cream cake.  She has not been doing any PKU formula (was not previously on, but it was discussed as a possibility during pregnancy for more complete nutrition).     LABS  Labs reviewed; no levels since giving birth, but patient state she has taken one at home and just needs to mail it in      MEDICATIONS  Medications reviewed; includes Vitafusion gummy prenatal vitamin; includes Kuvan      ASSESSED NUTRITION NEEDS:  Estimated Energy Needs: 30-35  kcal/kg  Estimated Protein Needs: 0.8-1.2 g/kg  Micronutrient Needs: RDA     NUTRITION DIAGNOSIS:  Impaired nutrient utilization related to diagnosis of mild PKU as evidenced by elevated PHE levels.     INTERVENTIONS  Nutrition Prescription  Meet 100% estimated kcal, protein needs through moderate-protein restricted diet    Nutrition Education:   Provided education on goals for nutrition for PKU/post-pregnancy.     Patient states she is now set up with [facility].  She has maintained her protein restriction/goal and feels her blood levels are within typical range (~2-3 mg/dL for patient).  She continues on 12 tablets of Kuvan per day.  Reviewed importance of keeping blood levels within treatment  range for concentration/mood and other benefits.  Reviewed no changes to nutrition in regards to patient breastfeeding/nursing; encouraged adequate fluids, frequent snacks, and variety of food groups including fruits/vegetables.     Goals  1. Stable weight/BMI  2. Meet >85% estimated nutrition needs through low/moderate protein diet  3. PHE levels 2-6 mg/dL    FOLLOW UP/MONITORING  Energy Intake  Macronutrient intake  Anthropometric measurements     Time spent with patient: 15 minutes

## 2017-04-07 ENCOUNTER — TELEPHONE (OUTPATIENT)
Dept: NUTRITION | Facility: CLINIC | Age: 32
End: 2017-04-07

## 2017-04-07 LAB
PHE SERPL-MCNC: 3 MG/DL (ref 0.5–1.6)
TYROSINE SERPL-MCNC: 1.5 MG/DL (ref 0.6–2.4)

## 2017-07-10 ENCOUNTER — APPOINTMENT (OUTPATIENT)
Dept: GENERAL RADIOLOGY | Facility: CLINIC | Age: 32
End: 2017-07-10
Attending: FAMILY MEDICINE
Payer: COMMERCIAL

## 2017-07-10 ENCOUNTER — HOSPITAL ENCOUNTER (EMERGENCY)
Facility: CLINIC | Age: 32
Discharge: HOME | End: 2017-07-10
Attending: FAMILY MEDICINE | Admitting: FAMILY MEDICINE
Payer: COMMERCIAL

## 2017-07-10 ENCOUNTER — NURSE TRIAGE (OUTPATIENT)
Dept: NURSING | Facility: CLINIC | Age: 32
End: 2017-07-10

## 2017-07-10 VITALS
OXYGEN SATURATION: 100 % | SYSTOLIC BLOOD PRESSURE: 106 MMHG | HEART RATE: 78 BPM | RESPIRATION RATE: 16 BRPM | DIASTOLIC BLOOD PRESSURE: 78 MMHG | TEMPERATURE: 98.5 F

## 2017-07-10 DIAGNOSIS — M54.41 ACUTE BILATERAL LOW BACK PAIN WITH RIGHT-SIDED SCIATICA: ICD-10-CM

## 2017-07-10 LAB
ALBUMIN UR-MCNC: 10 MG/DL
APPEARANCE UR: CLEAR
BILIRUB UR QL STRIP: NEGATIVE
COLOR UR AUTO: YELLOW
GLUCOSE UR STRIP-MCNC: NEGATIVE MG/DL
HCG UR QL: NEGATIVE
HGB UR QL STRIP: ABNORMAL
INTERNAL QC OK POCT: YES
KETONES UR STRIP-MCNC: NEGATIVE MG/DL
LEUKOCYTE ESTERASE UR QL STRIP: ABNORMAL
MUCOUS THREADS #/AREA URNS LPF: PRESENT /LPF
NITRATE UR QL: NEGATIVE
PH UR STRIP: 6.5 PH (ref 5–7)
RBC #/AREA URNS AUTO: >182 /HPF (ref 0–2)
SP GR UR STRIP: 1.01 (ref 1–1.03)
SQUAMOUS #/AREA URNS AUTO: 2 /HPF (ref 0–1)
URN SPEC COLLECT METH UR: ABNORMAL
UROBILINOGEN UR STRIP-MCNC: NORMAL MG/DL (ref 0–2)
WBC #/AREA URNS AUTO: 24 /HPF (ref 0–2)

## 2017-07-10 PROCEDURE — 25000132 ZZH RX MED GY IP 250 OP 250 PS 637: Performed by: FAMILY MEDICINE

## 2017-07-10 PROCEDURE — 87088 URINE BACTERIA CULTURE: CPT | Performed by: FAMILY MEDICINE

## 2017-07-10 PROCEDURE — 72100 X-RAY EXAM L-S SPINE 2/3 VWS: CPT

## 2017-07-10 PROCEDURE — 99284 EMERGENCY DEPT VISIT MOD MDM: CPT

## 2017-07-10 PROCEDURE — 81001 URINALYSIS AUTO W/SCOPE: CPT | Performed by: FAMILY MEDICINE

## 2017-07-10 PROCEDURE — 81025 URINE PREGNANCY TEST: CPT | Performed by: FAMILY MEDICINE

## 2017-07-10 PROCEDURE — 87086 URINE CULTURE/COLONY COUNT: CPT | Performed by: FAMILY MEDICINE

## 2017-07-10 PROCEDURE — 99284 EMERGENCY DEPT VISIT MOD MDM: CPT | Mod: Z6 | Performed by: FAMILY MEDICINE

## 2017-07-10 RX ORDER — CYCLOBENZAPRINE HCL 10 MG
10 TABLET ORAL ONCE
Status: COMPLETED | OUTPATIENT
Start: 2017-07-10 | End: 2017-07-10

## 2017-07-10 RX ORDER — OXYCODONE HYDROCHLORIDE 5 MG/1
5 TABLET ORAL EVERY 6 HOURS PRN
Qty: 10 TABLET | Refills: 0 | Status: SHIPPED | OUTPATIENT
Start: 2017-07-10 | End: 2017-10-25

## 2017-07-10 RX ORDER — IBUPROFEN 800 MG/1
800 TABLET, FILM COATED ORAL EVERY 8 HOURS PRN
Qty: 20 TABLET | Refills: 0 | Status: SHIPPED | OUTPATIENT
Start: 2017-07-10 | End: 2017-07-10

## 2017-07-10 RX ORDER — CYCLOBENZAPRINE HCL 10 MG
10 TABLET ORAL 3 TIMES DAILY PRN
Qty: 20 TABLET | Refills: 0 | Status: SHIPPED | OUTPATIENT
Start: 2017-07-10 | End: 2017-07-16

## 2017-07-10 RX ORDER — ACETAMINOPHEN 500 MG
1000 TABLET ORAL ONCE
Status: COMPLETED | OUTPATIENT
Start: 2017-07-10 | End: 2017-07-10

## 2017-07-10 RX ADMIN — CYCLOBENZAPRINE HYDROCHLORIDE 10 MG: 10 TABLET, FILM COATED ORAL at 09:28

## 2017-07-10 RX ADMIN — ACETAMINOPHEN 1000 MG: 500 TABLET ORAL at 09:28

## 2017-07-10 NOTE — ED AVS SNAPSHOT
[facility], Emergency Department    [address]    [place] [address]    Phone:  [phone]    Fax:  [phone]                                       Laila Cueva   MRN: [id number]    Department:  [facility], Emergency Department   Date of Visit:  7/10/2017           Patient Information     Date Of Birth          1985        Your diagnoses for this visit were:     Acute bilateral low back pain with right-sided sciatica        You were seen by Mikal Dangelo MD.        Discharge Instructions       Thank you for choosing [facility].     Please closely monitor for further symptoms. Return to the Emergency Department if you develop any new or worsening signs or symptoms.    If you received any opiate pain medications or sedatives during your visit, please do not drive for at least 8 hours.     Labs, cultures or final xray interpretations may still need to be reviewed.  We will call you if your plan of care needs to be changed.    Please follow up with your primary care physician or clinic in the next week to 10 days if not completely resolved.  Avoid ibuprofen or aspirin until you have discussed considerations regarding potential mild von Willebrand's disease with your primary physician.  Use Tylenol or oxycodone if needed for symptom relief along with Flexeril.      Possible Causes of Low Back or Leg Pain    The symptoms in your back or leg may be due to pressure on a nerve. This pressure may be caused by a damaged disk or by abnormal bone growth. Either way, you may feel pain, burning, tingling, or numbness. If you have pressure on a nerve that connects to the sciatic nerve, pain may shoot down your leg.    Pressure from the disk  Constant wear and tear can weaken a disk over time and cause back pain. The disk can then be damaged by a sudden movement or injury. If its soft center begins to bulge, the disk may press on a nerve. Or the outside of the disk may  tear, and the soft center may squeeze through and pinch a nerve.    Pressure from bone  As a disk wears out, the vertebrae right above and below the disk begin to touch. This can put pressure on a nerve. Often, abnormal bone (called bone spurs) grows where the vertebrae rub against each other. This can cause the foramen or the spinal canal to narrow (called stenosis) and press against a nerve.  Date Last Reviewed: 10/4/2015    7573-3125 [facility]. [address] [place] [address]. All rights reserved. This information is not intended as a substitute for professional medical care. Always follow your healthcare professional's instructions.          Back Care Tips    Caring for your back  These are things you can do to prevent a recurrence of acute back pain and to reduce symptoms from chronic back pain:    Maintain a healthy weight. If you are overweight, losing weight will help most types of back pain.    Exercise is an important part of recovery from most types of back pain. The muscles behind and in front of the spine support the back. This means strengthening both the back muscles and the abdominal muscles will provide better support for your spine.     Swimming and brisk walking are good overall exercises to improve your fitness level.    Practice safe lifting methods (below).    Practice good posture when sitting, standing and walking. Avoid prolonged sitting. This puts more stress on the lower back than standing or walking.    Wear quality shoes with sufficient arch support. Foot and ankle alignment can affect back symptoms. Women should avoid wearing high heels.    Therapeutic massage can help relax the back muscles without stretching them.    During the first 24 to 72 hours after an acute injury or flare-up of chronic back pain, apply an ice pack to the painful area for 20 minutes and then remove it for 20 minutes, over a period of 60 to 90 minutes, or several times a day. As a safety  precaution, do not use a heating pad at bedtime. Sleeping on a heating pad can lead to skin burns or tissue damage.    You can alternate ice and heat therapies.  Medications  Talk to your healthcare provider before using medicines, especially if you have other medical problems or are taking other medicines.    You may use acetaminophen or ibuprofen to control pain, unless your healthcare provider prescribed other pain medicine. If you have chronic conditions like diabetes, liver or kidney disease, stomach ulcers, or gastrointestinal bleeding, or are taking blood thinners, talk with your healthcare provider before taking any medicines.    Be careful if you are given prescription pain medicines, narcotics, or medicine for muscle spasm. They can cause drowsiness, affect your coordination, reflexes, and judgment. Do not drive or operate heavy machinery while taking these types of medicines. Take prescription pain medicine only as prescribed by your healthcare provider.  Lumbar stretch  Here is a simple stretching exercise that will help relax muscle spasm and keep your back more limber. If exercise makes your back pain worse, don t do it.    Lie on your back with your knees bent and both feet on the ground.    Slowly raise your left knee to your chest as you flatten your lower back against the floor. Hold for 5 seconds.    Relax and repeat the exercise with your right knee.    Do 10 of these exercises for each leg.  Safe lifting method    Don t bend over at the waist to lift an object off the floor.  Instead, bend your knees and hips in a squat.     Keep your back and head upright    Hold the object close to your body, directly in front of you.    Straighten your legs to lift the object.     Lower the object to the floor in the reverse fashion.    If you must slide something across the floor, push it.  Posture tips  Sitting  Sit in chairs with straight backs or low-back support. Keep your knees lower than your hips,  with your feet flat on the floor.  When driving, sit up straight. Adjust the seat forward so you are not leaning toward the steering wheel.  A small pillow or rolled towel behind your lower back may help if you are driving long distances.   Standing  When standing for long periods, shift most of your weight to one leg at a time. Alternate legs every few minutes.   Sleeping  The best way to sleep is on your side with your knees bent. Put a low pillow under your head to support your neck in a neutral spine position. Avoid thick pillows that bend your neck to one side. Put a pillow between your legs to further relax your lower back. If you sleep on your back, put pillows under your knees to support your legs in a slightly flexed position. Use a firm mattress. If your mattress sags, replace it, or use a 1/2-inch plywood board under the mattress to add support.  Follow-up care  Follow up with your healthcare provider, or as advised.  If X-rays, a CT scan or an MRI scan were taken, they will be reviewed by a radiologist. You will be notified of any new findings that may affect your care.  Call 911  Seek emergency medical care if any of the following occur:    Trouble breathing    Confusion    Very drowsy    Fainting or loss of consciousness    Rapid or very slow heart rate    Loss of  bowel or bladder control  When to seek medical care  Call your healthcare provider if any of the following occur:    Pain becomes worse or spreads to your arms or legs    Weakness or numbness in one or both arms or legs    Numbness in the groin area  Date Last Reviewed: 6/1/2016 2000-2017 [employer]. [address] [place] [address]. All rights reserved. This information is not intended as a substitute for professional medical care. Always follow your healthcare professional's instructions.          24 Hour Appointment Hotline       To make an appointment at any [facility], call [phone]  ([phone]). If you don't have a family doctor or clinic, we will help you find one. [facility] are conveniently located to serve the needs of you and your family.             Review of your medicines      START taking        Dose / Directions Last dose taken    cyclobenzaprine 10 MG tablet   Commonly known as:  FLEXERIL   Dose:  10 mg   Quantity:  20 tablet        Take 1 tablet (10 mg) by mouth 3 times daily as needed for muscle spasms   Refills:  0        oxyCODONE 5 MG IR tablet   Commonly known as:  ROXICODONE   Dose:  5 mg   Quantity:  10 tablet        Take 1 tablet (5 mg) by mouth every 6 hours as needed for pain   Refills:  0          Our records show that you are taking the medicines listed below. If these are incorrect, please call your family doctor or clinic.        Dose / Directions Last dose taken    PHENYLADE ESSENTIAL DRINK MIX Pack   Dose:  2 Package   Quantity:  62 packet        Take 2 Packages by mouth daily   Refills:  3        prenatal multivitamin  plus iron 27-0.8 MG Tabs per tablet   Dose:  1 tablet   Indication:  Pregnancy        Take 1 tablet by mouth daily   Refills:  0        sapropterin dihydrochloride 100 MG Tbso   Commonly known as:  KUVAN   Quantity:  360 tablet        Take 13 tablets by mouth once daily with food.   Refills:  11          STOP taking        Dose Reason for stopping Comments    ibuprofen 600 MG tablet   Commonly known as:  ADVIL/MOTRIN                      Prescriptions were sent or printed at these locations (2 Prescriptions)                   Other Prescriptions                Printed at Department/Unit printer (2 of 2)         cyclobenzaprine (FLEXERIL) 10 MG tablet               oxyCODONE (ROXICODONE) 5 MG IR tablet                Procedures and tests performed during your visit     Lumbar spine XR, 2-3 views    UA with Microscopic reflex to Culture    hCG qual urine POCT      Orders Needing Specimen Collection     None      Pending Results     Date and  Time Order Name Status Description    7/10/2017 0858 UA with Microscopic reflex to Culture In process             Pending Culture Results     Date and Time Order Name Status Description    7/10/2017 0858 UA with Microscopic reflex to Culture In process             Pending Results Instructions     If you had any lab results that were not finalized at the time of your Discharge, you can call the ED Lab Result RN at [phone]. You will be contacted by this team for any positive Lab results or changes in treatment. The nurses are available 7 days a week from 10A to 6:30P.  You can leave a message 24 hours per day and they will return your call.        Thank you for choosing [facility]       Thank you for choosing [facility] for your care. Our goal is always to provide you with excellent care. Hearing back from our patients is one way we can continue to improve our services. Please take a few minutes to complete the written survey that you may receive in the mail after you visit with us. Thank you!        [facility] Information     [employer] gives you secure access to your electronic health record. If you see a primary care provider, you can also send messages to your care team and make appointments. If you have questions, please call your primary care clinic.  If you do not have a primary care provider, please call [phone] and they will assist you.        Care EveryWhere ID     This is your Care EveryWhere ID. This could be used by other organizations to access your [facility] medical records  [id number]        Equal Access to Services     JAI FLORES : Nahed Pierce, waaxda dean, qaybta kaaljillian walsh. So [facility] [phone].    ATENCIÓN: Si habla español, tiene a coats disposición servicios gratuitos de asistencia lingüística. Llame al [phone].    We comply with applicable federal civil rights laws and [place] laws. We do not discriminate on the  basis of race, color, national origin, age, disability sex, sexual orientation or gender identity.            After Visit Summary       This is your record. Keep this with you and show to your community pharmacist(s) and doctor(s) at your next visit.

## 2017-07-10 NOTE — TELEPHONE ENCOUNTER
"  Reason for Disposition    Weakness of a leg or foot (e.g., unable to bear weight, dragging foot)    Additional Information    Negative: Passed out (i.e., lost consciousness, collapsed and was not responding)    Negative: Shock suspected (e.g., cold/pale/clammy skin, too weak to stand, low BP, rapid pulse)    Negative: Sounds like a life-threatening emergency to the triager    Negative: Major injury to the back (e.g., MVA, fall > 10 feet or 3 meters, penetrating injury, etc.)    Negative: Followed a tailbone injury    Negative: [1] Pain in the upper back over the ribs (rib cage) AND [2] radiates (travels, goes) into chest    Negative: [1] Pain in the upper back over the ribs (rib cage) AND [2] worsened by coughing (or clearly increases with breathing)    Negative: Back pain during pregnancy    Negative: Pain mainly in flank (i.e., in the side, over the lower ribs or just below the ribs)    Negative: [1] SEVERE back pain (e.g., excruciating) AND [2] sudden onset AND [3] age > 60    Negative: [1] Unable to urinate (or only a few drops) > 4 hours AND     [2] bladder feels very full (e.g., palpable bladder or strong urge to urinate)    Negative: [1] Urinary or bowel incontinence (i.e., loss of bladder or bowel control) AND [2] new onset    Negative: Numbness in groin or rectal area (i.e., loss of sensation)    Negative: [1] SEVERE abdominal pain AND [2] present > 1 hour    Negative: [1] Abdominal pain AND [2] age > 60    Protocols used: BACK PAIN-ADULT-  Patient states has had lower back pain for past 3 days and is now worse and lower extremities are \"tingly\".  FNA advised to go to ED.  "

## 2017-07-10 NOTE — ED AVS SNAPSHOT
[facility], Emergency Department    [address]    [facility] [address]    Phone:  [phone]    Fax:  [phone]                                       Laila Cueva   MRN: [id number]    Department:  [facility], Emergency Department   Date of Visit:  7/10/2017           After Visit Summary Signature Page     I have received my discharge instructions, and my questions have been answered. I have discussed any challenges I see with this plan with the nurse or doctor.    ..........................................................................................................................................  Patient/Patient Representative Signature      ..........................................................................................................................................  Patient Representative Print Name and Relationship to Patient    ..................................................               ................................................  Date                                            Time    ..........................................................................................................................................  Reviewed by Signature/Title    ...................................................              ..............................................  Date                                                            Time

## 2017-07-10 NOTE — DISCHARGE INSTRUCTIONS
Thank you for choosing [facility].     Please closely monitor for further symptoms. Return to the Emergency Department if you develop any new or worsening signs or symptoms.    If you received any opiate pain medications or sedatives during your visit, please do not drive for at least 8 hours.     Labs, cultures or final xray interpretations may still need to be reviewed.  We will call you if your plan of care needs to be changed.    Please follow up with your primary care physician or clinic in the next week to 10 days if not completely resolved.  Avoid ibuprofen or aspirin until you have discussed considerations regarding potential mild von Willebrand's disease with your primary physician.  Use Tylenol or oxycodone if needed for symptom relief along with Flexeril.      Possible Causes of Low Back or Leg Pain    The symptoms in your back or leg may be due to pressure on a nerve. This pressure may be caused by a damaged disk or by abnormal bone growth. Either way, you may feel pain, burning, tingling, or numbness. If you have pressure on a nerve that connects to the sciatic nerve, pain may shoot down your leg.    Pressure from the disk  Constant wear and tear can weaken a disk over time and cause back pain. The disk can then be damaged by a sudden movement or injury. If its soft center begins to bulge, the disk may press on a nerve. Or the outside of the disk may tear, and the soft center may squeeze through and pinch a nerve.    Pressure from bone  As a disk wears out, the vertebrae right above and below the disk begin to touch. This can put pressure on a nerve. Often, abnormal bone (called bone spurs) grows where the vertebrae rub against each other. This can cause the foramen or the spinal canal to narrow (called stenosis) and press against a nerve.  Date Last Reviewed: 10/4/2015    5175-6715 [employer]. [address] [place] [address], [place] [address]. All rights reserved.  This information is not intended as a substitute for professional medical care. Always follow your healthcare professional's instructions.          Back Care Tips    Caring for your back  These are things you can do to prevent a recurrence of acute back pain and to reduce symptoms from chronic back pain:    Maintain a healthy weight. If you are overweight, losing weight will help most types of back pain.    Exercise is an important part of recovery from most types of back pain. The muscles behind and in front of the spine support the back. This means strengthening both the back muscles and the abdominal muscles will provide better support for your spine.     Swimming and brisk walking are good overall exercises to improve your fitness level.    Practice safe lifting methods (below).    Practice good posture when sitting, standing and walking. Avoid prolonged sitting. This puts more stress on the lower back than standing or walking.    Wear quality shoes with sufficient arch support. Foot and ankle alignment can affect back symptoms. Women should avoid wearing high heels.    Therapeutic massage can help relax the back muscles without stretching them.    During the first 24 to 72 hours after an acute injury or flare-up of chronic back pain, apply an ice pack to the painful area for 20 minutes and then remove it for 20 minutes, over a period of 60 to 90 minutes, or several times a day. As a safety precaution, do not use a heating pad at bedtime. Sleeping on a heating pad can lead to skin burns or tissue damage.    You can alternate ice and heat therapies.  Medications  Talk to your healthcare provider before using medicines, especially if you have other medical problems or are taking other medicines.    You may use acetaminophen or ibuprofen to control pain, unless your healthcare provider prescribed other pain medicine. If you have chronic conditions like diabetes, liver or kidney disease, stomach ulcers, or  gastrointestinal bleeding, or are taking blood thinners, talk with your healthcare provider before taking any medicines.    Be careful if you are given prescription pain medicines, narcotics, or medicine for muscle spasm. They can cause drowsiness, affect your coordination, reflexes, and judgment. Do not drive or operate heavy machinery while taking these types of medicines. Take prescription pain medicine only as prescribed by your healthcare provider.  Lumbar stretch  Here is a simple stretching exercise that will help relax muscle spasm and keep your back more limber. If exercise makes your back pain worse, don t do it.    Lie on your back with your knees bent and both feet on the ground.    Slowly raise your left knee to your chest as you flatten your lower back against the floor. Hold for 5 seconds.    Relax and repeat the exercise with your right knee.    Do 10 of these exercises for each leg.  Safe lifting method    Don t bend over at the waist to lift an object off the floor.  Instead, bend your knees and hips in a squat.     Keep your back and head upright    Hold the object close to your body, directly in front of you.    Straighten your legs to lift the object.     Lower the object to the floor in the reverse fashion.    If you must slide something across the floor, push it.  Posture tips  Sitting  Sit in chairs with straight backs or low-back support. Keep your knees lower than your hips, with your feet flat on the floor.  When driving, sit up straight. Adjust the seat forward so you are not leaning toward the steering wheel.  A small pillow or rolled towel behind your lower back may help if you are driving long distances.   Standing  When standing for long periods, shift most of your weight to one leg at a time. Alternate legs every few minutes.   Sleeping  The best way to sleep is on your side with your knees bent. Put a low pillow under your head to support your neck in a neutral spine position. Avoid  thick pillows that bend your neck to one side. Put a pillow between your legs to further relax your lower back. If you sleep on your back, put pillows under your knees to support your legs in a slightly flexed position. Use a firm mattress. If your mattress sags, replace it, or use a 1/2-inch plywood board under the mattress to add support.  Follow-up care  Follow up with your healthcare provider, or as advised.  If X-rays, a CT scan or an MRI scan were taken, they will be reviewed by a radiologist. You will be notified of any new findings that may affect your care.  Call 911  Seek emergency medical care if any of the following occur:    Trouble breathing    Confusion    Very drowsy    Fainting or loss of consciousness    Rapid or very slow heart rate    Loss of  bowel or bladder control  When to seek medical care  Call your healthcare provider if any of the following occur:    Pain becomes worse or spreads to your arms or legs    Weakness or numbness in one or both arms or legs    Numbness in the groin area  Date Last Reviewed: 6/1/2016 2000-2017 [employer]. [address] [place] [address]. All rights reserved. This information is not intended as a substitute for professional medical care. Always follow your healthcare professional's instructions.

## 2017-07-10 NOTE — ED PROVIDER NOTES
"  History     Chief Complaint   Patient presents with     Back Pain     low back pain started this am, no trauma     HPI  Laila Cueva is a 32 year old female with a history of atypical PKU, Von Willebrand disease, and Raynaud phenomenon who presents to the Emergency Department for evaluation of back pain beginning this morning. Patient reports pain in the center of her lower back which radiates to her right hip. She notes that the pain significantly increases with movement. She rates that pain at a 2/10 while lying still and a 6-7/10 while moving. While laying, she complains of some paresthesia in her legs. She adds that she has a 6 month old son, who she finds very difficult to life and take care of with this pain. She can walk, but requires assistance. She took oxycodone for the pain around 8:00PM yesterday and 600 mg of ibuprofen around 7:30AM today. She denies loss of bowel or bladder control. She reports a history of pain similar to this while she was in college, and was told by her PCP that she may have a pinched nerve and that the pain would resolve on it's own. She notes a maternal family history of back problems and surgeries. She is not currently using any other drugs. She is not anticoagulated.  There is a reported history of \"very mild\" von Willebrand disease which mainly causes the menses.  She has never had any bleeding complications and at one point was told that she may not have von Willebrand's.       Past Medical History:   Diagnosis Date     Atypical PKU (H)     since birth, manage with diet      Raynaud phenomenon     affects fingers and toes     Von Willebrand disease (H)     affects mainly menses     Von Willebrand disease (H)        Past Surgical History:   Procedure Laterality Date     BIOPSY OF SKIN LESION      2 benign nevus removed      SECTION N/A 2017    Procedure:  SECTION;  Surgeon: Ana Maria Israel MD;  Location:  L+D      TOOTH EXTRACTION " W/FORCEP  2013       Family History   Problem Relation Age of Onset     Melanoma Maternal Grandmother       from another cancer type     Anemia Maternal Grandmother      Myocardial Infarction Father 64     alive     Anemia Mother      Hypertension Father        Social History   Substance Use Topics     Smoking status: Never Smoker     Smokeless tobacco: Never Used     Alcohol use No       No current facility-administered medications for this encounter.      Current Outpatient Prescriptions   Medication     cyclobenzaprine (FLEXERIL) 10 MG tablet     oxyCODONE (ROXICODONE) 5 MG IR tablet     sapropterin dihydrochloride (KUVAN) 100 MG TBSO     Prenatal Vit-Fe Fumarate-FA (PRENATAL MULTIVITAMIN  PLUS IRON) 27-0.8 MG TABS     Nutritional Supplements (PHENYLADE ESSENTIAL DRINK MIX) PACK     Facility-Administered Medications Ordered in Other Encounters   Medication     lidocaine-EPINEPHrine 1.5 %-1:[id number] injection     bupivacaine (MARCAINE) 0.125 % injection (diluted from stock concentration by MD or CRNA)        Allergies   Allergen Reactions     Aspartame      Nuts Other (See Comments)         I have reviewed the Medications, Allergies, Past Medical and Surgical History, and Social History in the Epic system.    Review of Systems   ROS: 10 point ROS neg other than the symptoms noted above in the HPI.    Physical Exam   BP: 115/72  Pulse: 82  Temp: 98.3  F (36.8  C)  Resp: 16  SpO2: 100 %  Physical Exam   Constitutional: She is oriented to person, place, and time. She appears well-developed and well-nourished.   HENT:   Head: Normocephalic and atraumatic.   Mouth/Throat: Oropharynx is clear and moist.   Eyes: EOM are normal. Pupils are equal, round, and reactive to light.   Neck: Normal range of motion. Neck supple. No tracheal deviation present. No thyromegaly present.   Cardiovascular: Normal rate, regular rhythm, normal heart sounds and intact distal pulses.  Exam reveals no gallop and no friction rub.    No  murmur heard.  Pulmonary/Chest: Effort normal and breath sounds normal. She exhibits no tenderness.   Abdominal: Soft. Bowel sounds are normal. She exhibits no distension and no mass. There is no tenderness.   Musculoskeletal: She exhibits no edema.        Lumbar back: She exhibits tenderness, bony tenderness (he does have diffuse midline tenderness of her lumbar spine.  No point vertebral tenderness.), pain and spasm. She exhibits no swelling and normal pulse.   No bruising swelling or ecchymosis is noted.   Neurological: She is alert and oriented to person, place, and time. She has normal strength. No cranial nerve deficit. She exhibits normal muscle tone. Coordination normal.   Reflex Scores:       Patellar reflexes are 2+ on the right side and 2+ on the left side.       Achilles reflexes are 2+ on the right side and 2+ on the left side.  Skin: Skin is warm and dry. No rash noted.   Psychiatric: She has a normal mood and affect. Her behavior is normal.   Nursing note and vitals reviewed.      ED Course   9:03 AM  The patient was seen and examined by Julius Dangelo MD in Room 20.     ED Course     Procedures             Critical Care time:  none               Labs Ordered and Resulted from Time of ED Arrival Up to the Time of Departure from the ED   HCG QUAL URINE POCT            Assessments & Plan (with Medical Decision Making)   Patient presented with nontraumatic lower back pain.   Differential diagnosis did consider life threatening causes of acute lower back pain such as retroperitoneal bleed, pancreatitis, pyelonephritis, vertebral osteomyelitis, discitis, epidural hematoma or abscess, malignancy, other causes of severe spinal stenosis and cauda equina syndrome, as well as pyelonephritis, ovarian cyst, dysmenorrhea and other potential causes of referred pain.  She has normal vital signs, afebrile, completely normal abdominal pelvic exam, no CVA tenderness, and denies any urinary symptoms whatsoever.  She is  menstruating, which can explain the cellular urinalysis.  She is not pregnant.  Her pain is reproducible with rotation and flexion of the trunk or palpation in the area of the lumbar spine.  She does have a history of PKU which can be associated with osteopenia in adults, and so plain x-ray was obtained which did not show osteopenia compression fracture or other acute abnormality.  There is a history of von Willebrand's disease, however this is apparently very mild with no history of bleeding complications and in this clinical scenario seems very unlikely that spontaneous epidural hematoma would be the cause of her symptoms.  There is definitely no sign of cauda equina syndrome as her strength reflexes and sensation are intact in the lower extremities  At this point I will treat symptomatically for musculoskeletal pain with a component of sciatica.  I discussed the indications for return and follow-up with the patient I feel she is stable for discharge.      I have reviewed the nursing notes.    I have reviewed the findings, diagnosis, plan and need for follow up with the patient.    Discharge Medication List as of 7/10/2017 10:24 AM      START taking these medications    Details   cyclobenzaprine (FLEXERIL) 10 MG tablet Take 1 tablet (10 mg) by mouth 3 times daily as needed for muscle spasms, Disp-20 tablet, R-0, Local Print      oxyCODONE (ROXICODONE) 5 MG IR tablet Take 1 tablet (5 mg) by mouth every 6 hours as needed for pain, Disp-10 tablet, R-0, Local Print             Final diagnoses:   Acute bilateral low back pain with right-sided sciatica   Alma Delia HINOJOSA, joon serving as a trained medical scribe to document services personally performed by Julius Dangelo MD, based on the provider's statements to me.      IJulius MD, was physically present and have reviewed and verified the accuracy of this note documented by Alma Delia Dueñas.      7/10/2017   [facility], [place], EMERGENCY DEPARTMENT     Mikal Dangelo,  MD  07/10/17 1108

## 2017-07-12 LAB
BACTERIA SPEC CULT: ABNORMAL
Lab: ABNORMAL
MICRO REPORT STATUS: ABNORMAL
SPECIMEN SOURCE: ABNORMAL

## 2017-07-26 ENCOUNTER — PRENATAL OFFICE VISIT (OUTPATIENT)
Dept: OBGYN | Facility: CLINIC | Age: 32
End: 2017-07-26
Payer: COMMERCIAL

## 2017-07-26 VITALS
BODY MASS INDEX: 25.68 KG/M2 | HEIGHT: 63 IN | SYSTOLIC BLOOD PRESSURE: 111 MMHG | HEART RATE: 87 BPM | DIASTOLIC BLOOD PRESSURE: 71 MMHG | TEMPERATURE: 98.3 F | WEIGHT: 144.9 LBS

## 2017-07-26 DIAGNOSIS — Z30.430 ENCOUNTER FOR IUD INSERTION: Primary | ICD-10-CM

## 2017-07-26 DIAGNOSIS — Z30.014 ENCOUNTER FOR INITIAL PRESCRIPTION OF INTRAUTERINE CONTRACEPTIVE DEVICE (IUD): ICD-10-CM

## 2017-07-26 LAB — BETA HCG QUAL IFA URINE: NEGATIVE

## 2017-07-26 PROCEDURE — 58300 INSERT INTRAUTERINE DEVICE: CPT | Performed by: OBSTETRICS & GYNECOLOGY

## 2017-07-26 PROCEDURE — 84703 CHORIONIC GONADOTROPIN ASSAY: CPT | Performed by: OBSTETRICS & GYNECOLOGY

## 2017-07-26 NOTE — PATIENT INSTRUCTIONS

## 2017-07-26 NOTE — MR AVS SNAPSHOT
After Visit Summary   7/26/2017    Laila Cueva    MRN: [id number]           Patient Information     Date Of Birth          1985        Visit Information        Provider Department      7/26/2017 9:15 AM Erika De La Paz MD [facility]        Today's Diagnoses     Encounter for IUD insertion    -  1      Care Instructions    What Mirena Users May Expect    What to watch for right after Mirena is placed  Some women may experience uterine cramps, bleeding, and/or dizziness during and right after Mirena is placed. To help minimize the cramps, you may taken ibuprofen 600 mg with food prior to your appointment. These symptoms should improve over the next 24 hours.  Mild cramping may be present for a few days after your placement  As a follow up, you should check your strings on 4 weeks or visit your clinic once in the first 4 to 12 weeks after Mirena is placed to make sure it is in the right position. After that, Mirena can be checked once a year as part of your routine exam.    Please use a back-up method (abstinence or condoms) for 5 days after placement.    Your periods may change  For the first 3 to 6 months, your monthly period may become irregular. You may also have frequent spotting or light bleeding. A few women have heavy bleeding during this time. After your body adjusts, the number of bleeding days is likely to decrease (but may remain irregular), and you may even find that your periods stop altogether for as long as Mirena is in place. Around the end of the third month of use, you may see up to a 75% reduction in the amount of menstrual bleeding. By one year, about 1 out of 5 users may hay have no period at all. At the end of two years, 70% have little or no bleeding. Your periods will return rapidly once Mirena is removed.     Mirena Strings  You may check your own Mirena strings by inserting a finger into the vagina and feeling the strings as they exit the cervix.   The strings will initially feel firm, like fishing line, but will soften over a few weeks.  After the strings have softened, you or your partner should not be able to feel the strings during intercourse.  If you can feel the IUD, see your healthcare provider to have the position confirmed.  You may use tampons with Mirena in place.    Mirena does not protect against HIV or STDs.  Mirena does not prevent the formation of ovarian cysts.  Mirena does not typically reduce acne or cause weight gain or mood changes.    Please call [facility] at [phone] if you have questions or concerns.    For more information:  [email or web address]                  Follow-ups after your visit        Who to contact     If you have questions or need follow up information about today's clinic visit or your schedule please contact [facility] directly at [phone].  Normal or non-critical lab and imaging results will be communicated to you by MyChart, letter or phone within 4 business days after the clinic has received the results. If you do not hear from us within 7 days, please contact the clinic through [facility] or phone. If you have a critical or abnormal lab result, we will notify you by phone as soon as possible.  Submit refill requests through [facility] or call your pharmacy and they will forward the refill request to us. Please allow 3 business days for your refill to be completed.          Additional Information About Your Visit        [facility] Information     [employer] gives you secure access to your electronic health record. If you see a primary care provider, you can also send messages to your care team and make appointments. If you have questions, please call your primary care clinic.  If you do not have a primary care provider, please call [phone] and they will assist you.        Care EveryWhere ID     This is your Care EveryWhere ID. This could be used by other organizations to  "access your [place] medical records  [id number]        Your Vitals Were     Pulse Temperature Height Last Period BMI (Body Mass Index)       87 98.3  F (36.8  C) (Oral) 5' 3.19\" (1.605 m) 07/09/2017 (Exact Date) 25.51 kg/m2        Blood Pressure from Last 3 Encounters:   07/26/17 111/71   07/10/17 106/78   03/27/17 106/64    Weight from Last 3 Encounters:   07/26/17 144 lb 14.4 oz (65.7 kg)   03/27/17 140 lb 10.5 oz (63.8 kg)   02/17/17 146 lb 8 oz (66.5 kg)              We Performed the Following     Beta HCG qual IFA urine        Primary Care Provider Office Phone # Fax #    Billie MilianJEREMY Gutiérrez [facility] [phone]       [place] [phone]        Equal Access to Services     JAI FLORES : Hadii abhi ku hadasho Soomaali, waaxda luqadaha, qaybta kaalmada adeegyada, waxay jodyin shakeel osborne . So [facility] [phone].    ATENCIÓN: Si omero galindo, tiene a coats disposición servicios gratuitos de asistencia lingüística. Llame al [phone].    We comply with applicable federal civil rights laws and [place] laws. We do not discriminate on the basis of race, color, national origin, age, disability sex, sexual orientation or gender identity.            Thank you!     Thank you for choosing [facility]  for your care. Our goal is always to provide you with excellent care. Hearing back from our patients is one way we can continue to improve our services. Please take a few minutes to complete the written survey that you may receive in the mail after your visit with us. Thank you!             Your Updated Medication List - Protect others around you: Learn how to safely use, store and throw away your medicines at www.disposemymeds.org.          This list is accurate as of: 7/26/17  9:51 AM.  Always use your most recent med list.                   Brand Name Dispense Instructions for use Diagnosis    oxyCODONE 5 MG IR tablet    " ROXICODONE    10 tablet    Take 1 tablet (5 mg) by mouth every 6 hours as needed for pain        PHENYLADE ESSENTIAL DRINK MIX Pack     62 packet    Take 2 Packages by mouth daily    Classical phenylketonuria (H)       prenatal multivitamin  plus iron 27-0.8 MG Tabs per tablet      Take 1 tablet by mouth daily        sapropterin dihydrochloride 100 MG Tbso    KUVAN    360 tablet    Take 13 tablets by mouth once daily with food.    Phenylketonuria (PKU) (H)

## 2017-07-26 NOTE — NURSING NOTE
"Chief Complaint   Patient presents with     IUD     iud insertion, benjamin NDC: [id number], EXP: 10/2023, LOT: [id number]       Initial /71  Pulse 87  Temp 98.3  F (36.8  C) (Oral)  Ht 5' 3.19\" (1.605 m)  Wt 144 lb 14.4 oz (65.7 kg)  LMP 2017 (Exact Date)  BMI 25.51 kg/m2 Estimated body mass index is 25.51 kg/(m^2) as calculated from the following:    Height as of this encounter: 5' 3.19\" (1.605 m).    Weight as of this encounter: 144 lb 14.4 oz (65.7 kg).  BP completed using cuff size: regular        The following HM Due: NONE      The following patient reported/Care Every where data was sent to:  P ABSTRACT QUALITY INITIATIVES [[id number]]       patient has appointment for today  Rebecca Moulton               "

## 2017-07-26 NOTE — PROGRESS NOTES
"CC:  IUD insertion  HPI:  Laila Cueva is a 32 year old female Patient's last menstrual period was 07/09/2017 (exact date).  Menses are regular q 28-30 days, lasting 5 days.  No other c/o.  She is here for an IUD insertion. Patient has verbalized understanding of risks and benefits and has signed the consent form.    Past GYN history:  No STD history       Last PAP smear:  Normal    The patient's past medical history, social history and family history is reviewed at our visit and updated in EPIC.    Allergies: Aspartame and Nuts    Current Outpatient Prescriptions   Medication Sig Dispense Refill     oxyCODONE (ROXICODONE) 5 MG IR tablet Take 1 tablet (5 mg) by mouth every 6 hours as needed for pain 10 tablet 0     sapropterin dihydrochloride (KUVAN) 100 MG TBSO Take 13 tablets by mouth once daily with food. 360 tablet 11     Nutritional Supplements (PHENYLADE ESSENTIAL DRINK MIX) PACK Take 2 Packages by mouth daily 62 packet 3     Prenatal Vit-Fe Fumarate-FA (PRENATAL MULTIVITAMIN  PLUS IRON) 27-0.8 MG TABS Take 1 tablet by mouth daily           ROS:  C: NEGATIVE for fever, chills, change in weight  R: NEGATIVE for significant cough or SOB  CV: NEGATIVE for chest pain, palpitations or peripheral edema  GI: NEGATIVE for nausea, abdominal pain, heartburn, or change in bowel habits  : NEGATIVE for frequency, dysuria, hematuria, vaginal discharge, or irregular bleeding      EXAM:  Blood pressure 111/71, pulse 87, temperature 98.3  F (36.8  C), temperature source Oral, height 5' 3.19\" (1.605 m), weight 144 lb 14.4 oz (65.7 kg), last menstrual period 07/09/2017, currently breastfeeding.  General - pleasant female in no acute distress.  Pelvic - EG: normal adult female, BUS: within normal limits, Vagina: well rugated, no discharge, Cervix: no lesions or CMT, Uterus: firm, normal sized and nontender, Adnexae: no masses or tenderness.    PROCEDURE:  After informed consent was obtained from the patient, a speculum was " placed in the vagina to visualized the cervix.  The cervix was then swabbed with a betadine prep.  Tenaculum was placed at the 12 o'clock position on the cervix and the uterus sounded to 9cm.  The Paragard T-380  IUD was then placed in the usual fashion under sterile technique.  Strings were clipped about 3-4 cm from the cervical os.  Tenaculum was removed and cervix was hemostatic. There were no complications. The patient tolerated the procedure well.      ASSESSMENT/PLAN:  1) Contraception, successful IUD placement   The patient should feel for the IUD strings after her next menses.  If unable to locate them, she should return to clinic for a speculum examination for confirmation that the IUD is in place. Bleeding pattern of this particular IUD was discussed with the patient. She is aware that the IUD will need to be removed in 10 years or PRN.  She is to return to clinic for her next annual or PRN.      Erika De La Paz

## 2017-09-20 ENCOUNTER — TELEPHONE (OUTPATIENT)
Dept: CONSULT | Facility: CLINIC | Age: 32
End: 2017-09-20

## 2017-09-20 NOTE — TELEPHONE ENCOUNTER
[facility] Prior Authorization Team   Phone: [phone]  Fax: [phone]    PA Initiation    Medication: sapropterin dihydrochloride (KUVAN) 100 MG TBSO   Insurance Company: [facility] - Phone [phone] Fax [phone]  Pharmacy Filling the Rx: [facility] - [place], [place] - [facility] [address]  Filling Pharmacy Phone: [phone]  Filling Pharmacy Fax: [phone]  Start Date: 9/20/2017

## 2017-10-25 ENCOUNTER — OFFICE VISIT (OUTPATIENT)
Dept: MIDWIFE SERVICES | Facility: CLINIC | Age: 32
End: 2017-10-25
Payer: COMMERCIAL

## 2017-10-25 VITALS
SYSTOLIC BLOOD PRESSURE: 113 MMHG | DIASTOLIC BLOOD PRESSURE: 53 MMHG | HEART RATE: 78 BPM | BODY MASS INDEX: 26.73 KG/M2 | TEMPERATURE: 98.4 F | WEIGHT: 151.8 LBS | OXYGEN SATURATION: 97 %

## 2017-10-25 DIAGNOSIS — R30.0 DYSURIA: ICD-10-CM

## 2017-10-25 DIAGNOSIS — N89.8 VAGINAL IRRITATION: ICD-10-CM

## 2017-10-25 DIAGNOSIS — B37.31 YEAST INFECTION OF THE VAGINA: ICD-10-CM

## 2017-10-25 DIAGNOSIS — N89.8 VAGINAL DISCHARGE: ICD-10-CM

## 2017-10-25 DIAGNOSIS — Z32.00 PREGNANCY EXAMINATION OR TEST, PREGNANCY UNCONFIRMED: ICD-10-CM

## 2017-10-25 DIAGNOSIS — B96.89 BV (BACTERIAL VAGINOSIS): Primary | ICD-10-CM

## 2017-10-25 DIAGNOSIS — N76.0 BV (BACTERIAL VAGINOSIS): Primary | ICD-10-CM

## 2017-10-25 LAB
ALBUMIN UR-MCNC: NEGATIVE MG/DL
APPEARANCE UR: CLEAR
BETA HCG QUAL IFA URINE: NEGATIVE
BILIRUB UR QL STRIP: NEGATIVE
COLOR UR AUTO: YELLOW
GLUCOSE UR STRIP-MCNC: NEGATIVE MG/DL
HGB UR QL STRIP: NEGATIVE
KETONES UR STRIP-MCNC: NEGATIVE MG/DL
LEUKOCYTE ESTERASE UR QL STRIP: NEGATIVE
NITRATE UR QL: NEGATIVE
PH UR STRIP: 6 PH (ref 5–7)
SOURCE: NORMAL
SP GR UR STRIP: 1.01 (ref 1–1.03)
SPECIMEN SOURCE: ABNORMAL
UROBILINOGEN UR STRIP-ACNC: 0.2 EU/DL (ref 0.2–1)
WET PREP SPEC: ABNORMAL

## 2017-10-25 PROCEDURE — 99213 OFFICE O/P EST LOW 20 MIN: CPT | Performed by: ADVANCED PRACTICE MIDWIFE

## 2017-10-25 PROCEDURE — 81003 URINALYSIS AUTO W/O SCOPE: CPT | Performed by: ADVANCED PRACTICE MIDWIFE

## 2017-10-25 PROCEDURE — 87210 SMEAR WET MOUNT SALINE/INK: CPT | Performed by: ADVANCED PRACTICE MIDWIFE

## 2017-10-25 PROCEDURE — 84703 CHORIONIC GONADOTROPIN ASSAY: CPT | Performed by: ADVANCED PRACTICE MIDWIFE

## 2017-10-25 RX ORDER — METRONIDAZOLE 500 MG/1
500 TABLET ORAL 2 TIMES DAILY
Qty: 14 TABLET | Refills: 0 | Status: SHIPPED | OUTPATIENT
Start: 2017-10-25 | End: 2018-02-06

## 2017-10-25 RX ORDER — FLUCONAZOLE 150 MG/1
150 TABLET ORAL
Qty: 4 TABLET | Refills: 0 | Status: SHIPPED | OUTPATIENT
Start: 2017-10-25 | End: 2018-02-06

## 2017-10-25 NOTE — PROGRESS NOTES
SUBJECTIVE:  Laila Cueva is a 32 year old female presents with discharge described as copious, yellow and bloody, local irritation and burning for 10 days. She also complains of some pelvic cramping and mild burning with peeing. States that her last period was different than usual (longer and lighter) and she has not been able to feel her IUD strings after her last two cycles. She is using condoms as well as IUD r/t not having a lot of confidence in IUD with these symptoms.     Contraception IUD Paragard    REVIEW OF SYSTEMS  C: NEGATIVE for fever, chills, change in weight  R: NEGATIVE for significant cough or SOB  CV: NEGATIVE for chest pain, palpitations or peripheral edema  GI: NEGATIVE for nausea, abdominal pain, heartburn, or change in bowel habits  : NEGATIVE for frequency, dysuria, or hematuria      General medical, surgical, OB/Gyn and social histories   reviewed and updated in Histories section of [facility].     OBJECTIVE:   EXAM  /53  Pulse 78  Temp 98.4  F (36.9  C) (Oral)  Wt 151 lb 12.8 oz (68.9 kg)  LMP 10/07/2017  SpO2 97%  BMI 26.73 kg/m2  Patient appears well.    Abdomen normal, soft without tenderness, guarding, mass or organomegaly.   No inguinal adenopathy or CVA tenderness.    PELVIC EXAM:  PELVIC EXAM:  Vulva: BUS WNL, no lesions noted,   Vagina: Discharge Thick and tanish with evidence of blood, no lesions, well rugated, good tone,   Cervix: Smooth, pink, no visible lesions, neg CMT, IUD stings are extending 2cm from cervical os.   Uterus: Normal size and position, non-tender, mobile,   Ovaries: No masses palpable, non-tender, mobile,   Rectal exam: deferred    WET PREP: Positive for clue cells and yeast   UA: Unremarkable  UPT: negative  GC/CHLAMYDIA CULTURE OBTAINED:PATIENT DECLINED    ASSESSMENT:   yeast, bacterial vaginosis.  (R30.0) Dysuria  (primary encounter diagnosis)  Comment:   Plan: UA reflex to Microscopic and Culture            (Z32.00) Pregnancy examination or  test, pregnancy unconfirmed  Comment:   Plan: Beta HCG qual IFA urine            (N89.8) Vaginal discharge  Comment:   Plan:     (N89.8) Vaginal irritation  Comment:   Plan: Wet prep              PLAN:  Treatment plan per orders in [facility].   STD prevention discussed. Birth control needs reviewed.  Abstain from intercourse for duration of treatment.   Return if symptoms do not resolve as anticipated.  Given letter/ handout on healthy vaginal environment.      Areli Calix CNM APRN

## 2017-10-25 NOTE — MR AVS SNAPSHOT
After Visit Summary   10/25/2017    Laila Cueva    MRN: [id number]           Patient Information     Date Of Birth          1985        Visit Information        Provider Department      10/25/2017 4:15 PM Areli Calix APRN CNM [facility]        Today's Diagnoses     BV (bacterial vaginosis)    -  1    Dysuria        Pregnancy examination or test, pregnancy unconfirmed        Vaginal discharge        Vaginal irritation        Yeast infection of the vagina           Follow-ups after your visit        Who to contact     If you have questions or need follow up information about today's clinic visit or your schedule please contact [facility] directly at [phone].  Normal or non-critical lab and imaging results will be communicated to you by Fanzterhart, letter or phone within 4 business days after the clinic has received the results. If you do not hear from us within 7 days, please contact the clinic through Fanzterhart or phone. If you have a critical or abnormal lab result, we will notify you by phone as soon as possible.  Submit refill requests through [employer] or call your pharmacy and they will forward the refill request to us. Please allow 3 business days for your refill to be completed.          Additional Information About Your Visit        MyChart Information     [employer] gives you secure access to your electronic health record. If you see a primary care provider, you can also send messages to your care team and make appointments. If you have questions, please call your primary care clinic.  If you do not have a primary care provider, please call [phone] and they will assist you.        Care EveryWhere ID     This is your Care EveryWhere ID. This could be used by other organizations to access your [place] medical records  [id number]        Your Vitals Were     Pulse Temperature Last Period Pulse Oximetry BMI (Body Mass Index)       78 98.4  F  (36.9  C) (Oral) 10/07/2017 97% 26.73 kg/m2        Blood Pressure from Last 3 Encounters:   10/25/17 113/53   07/26/17 111/71   07/10/17 106/78    Weight from Last 3 Encounters:   10/25/17 151 lb 12.8 oz (68.9 kg)   07/26/17 144 lb 14.4 oz (65.7 kg)   03/27/17 140 lb 10.5 oz (63.8 kg)              We Performed the Following     Beta HCG qual IFA urine     UA reflex to Microscopic and Culture     Wet prep          Today's Medication Changes          These changes are accurate as of: 10/25/17  6:11 PM.  If you have any questions, ask your nurse or doctor.               Start taking these medicines.        Dose/Directions    fluconazole 150 MG tablet   Commonly known as:  DIFLUCAN   Used for:  Yeast infection of the vagina   Started by:  Areli Calix APRN CNM        Dose:  150 mg   Take 1 tablet (150 mg) by mouth every 3 days   Quantity:  4 tablet   Refills:  0       metroNIDAZOLE 500 MG tablet   Commonly known as:  FLAGYL   Used for:  BV (bacterial vaginosis)   Started by:  Areli Calix APRN CNAMANDA        Dose:  500 mg   Take 1 tablet (500 mg) by mouth 2 times daily   Quantity:  14 tablet   Refills:  0            Where to get your medicines      These medications were sent to "[facility] - [place], [place] - [address] AT [facility] [address]  [address], [place] [address]     Phone:  [phone]     fluconazole 150 MG tablet    metroNIDAZOLE 500 MG tablet                Primary Care Provider Office Phone # Fax #    Billie Jacqueline Mathews, JEREMY [facility] [phone] [address] [facility] [address]        Equal Access to Services     MARILYN FLORES AH: Nahed Pierce, estee moran, britton kaalmada marin, jillian burgess. Korin [facility] [phone].    ATENCIÓN: Si habla español, tiene a coats disposición servicios gratuitos de asistencia lingüística. Martha al [phone].    We comply with applicable federal civil  rights laws and [place] laws. We do not discriminate on the basis of race, color, national origin, age, disability, sex, sexual orientation, or gender identity.            Thank you!     Thank you for choosing [facility]  for your care. Our goal is always to provide you with excellent care. Hearing back from our patients is one way we can continue to improve our services. Please take a few minutes to complete the written survey that you may receive in the mail after your visit with us. Thank you!             Your Updated Medication List - Protect others around you: Learn how to safely use, store and throw away your medicines at www.disposemymeds.org.          This list is accurate as of: 10/25/17  6:11 PM.  Always use your most recent med list.                   Brand Name Dispense Instructions for use Diagnosis    fluconazole 150 MG tablet    DIFLUCAN    4 tablet    Take 1 tablet (150 mg) by mouth every 3 days    Yeast infection of the vagina       metroNIDAZOLE 500 MG tablet    FLAGYL    14 tablet    Take 1 tablet (500 mg) by mouth 2 times daily    BV (bacterial vaginosis)       PHENYLADE ESSENTIAL DRINK MIX Pack     62 packet    Take 2 Packages by mouth daily    Classical phenylketonuria (H)       prenatal multivitamin plus iron 27-0.8 MG Tabs per tablet      Take 1 tablet by mouth daily        sapropterin dihydrochloride 100 MG Tbso    KUVAN    360 tablet    Take 13 tablets by mouth once daily with food.    Phenylketonuria (PKU) (H)

## 2017-11-01 NOTE — TELEPHONE ENCOUNTER
Prior Authorization Approval    Authorization Effective Date: 9/21/2017  Authorization Expiration Date: 9/21/2018  Medication: sapropterin dihydrochloride (KUVAN) 100 MG TBSO   Approved Dose/Quantity: UD  Reference #: (Key: [id number])   Insurance Company: [employer] [place] - Phone [phone] Fax [phone]  Expected CoPay: 0.00     CoPay Card Available: No   Foundation Assistance Needed: NA  Which Pharmacy is filling the prescription (Not needed for infusion/clinic administered): [facility] - [place], [place] - [facility] [address]  Pharmacy Notified: Yes  Patient Notified: Yes  LETTER NA

## 2018-02-06 ENCOUNTER — OFFICE VISIT (OUTPATIENT)
Dept: OBGYN | Facility: CLINIC | Age: 33
End: 2018-02-06
Payer: COMMERCIAL

## 2018-02-06 VITALS
HEART RATE: 75 BPM | WEIGHT: 146.7 LBS | TEMPERATURE: 98.3 F | SYSTOLIC BLOOD PRESSURE: 131 MMHG | BODY MASS INDEX: 25.83 KG/M2 | DIASTOLIC BLOOD PRESSURE: 64 MMHG

## 2018-02-06 DIAGNOSIS — N92.0 EXCESSIVE OR FREQUENT MENSTRUATION: Primary | ICD-10-CM

## 2018-02-06 DIAGNOSIS — D25.1 INTRAMURAL LEIOMYOMA OF UTERUS: ICD-10-CM

## 2018-02-06 LAB — HGB BLD-MCNC: 12.6 G/DL (ref 11.7–15.7)

## 2018-02-06 PROCEDURE — 85018 HEMOGLOBIN: CPT | Performed by: OBSTETRICS & GYNECOLOGY

## 2018-02-06 PROCEDURE — 99213 OFFICE O/P EST LOW 20 MIN: CPT | Performed by: OBSTETRICS & GYNECOLOGY

## 2018-02-06 PROCEDURE — 36415 COLL VENOUS BLD VENIPUNCTURE: CPT | Performed by: OBSTETRICS & GYNECOLOGY

## 2018-02-06 NOTE — NURSING NOTE
"Chief Complaint   Patient presents with     Consult     pain down leg during menstrual cycle, very heavy flow 50ml in a day       Initial /64  Pulse 75  Temp 98.3  F (36.8  C) (Oral)  Wt 146 lb 11.2 oz (66.5 kg)  LMP 2018  BMI 25.83 kg/m2 Estimated body mass index is 25.83 kg/(m^2) as calculated from the following:    Height as of 17: 5' 3.19\" (1.605 m).    Weight as of this encounter: 146 lb 11.2 oz (66.5 kg).  BP completed using cuff size: regular        The following HM Due: NONE      The following patient reported/Care Every where data was sent to:  P ABSTRACT QUALITY INITIATIVES [[id number]]        patient has appointment for today  Rebecca Moulton                "

## 2018-02-06 NOTE — MR AVS SNAPSHOT
After Visit Summary   2/6/2018    Laila Cueva    MRN: [id number]           Patient Information     Date Of Birth          1985        Visit Information        Provider Department      2/6/2018 12:30 PM Erika De La Paz MD [facility] [place]        Today's Diagnoses     Excessive or frequent menstruation    -  1    Intramural leiomyoma of uterus           Follow-ups after your visit        Your next 10 appointments already scheduled     Feb 12, 2018  7:00 AM CST   US PELVIC COMPLETE W TRANSVAGINAL with RDUS1   [facility] [place]    [address] [place] [address] [phone]           Please bring a list of your medicines (including vitamins, minerals and over-the-counter drugs). Also, tell your doctor about any allergies you may have. Wear comfortable clothes and leave your valuables at home.  Adults: Drink six 8-ounce glasses of fluid one hour before your exam. Do NOT empty your bladder.  If you need to empty your bladder before your exam, try to release only a little bit of urine. Then, drink another 8oz glass of fluid.  Children: Children who are potty trained should drink at least 4 cups (32 oz) of liquid 45 minutes to one hour prior to the exam. The child s bladder must be full in order to achieve a diagnostic exam. If your child is very uncomfortable or has an urgent need to pee, please notify a technologist; they will try to find out how much longer the wait may be and provide instructions to help relieve the pressure. Occasionally it is medically necessary to insert a urinary catheter to fill the bladder.  Please call the Imaging Department at your exam site with any questions.              Future tests that were ordered for you today     Open Future Orders        Priority Expected Expires Ordered    US Pelvic Complete w Transvaginal Routine  2/6/2019 2/6/2018            Who to contact     If you have questions  or need follow up information about today's clinic visit or your schedule please contact [facility] directly at [phone].  Normal or non-critical lab and imaging results will be communicated to you by Tokopediahart, letter or phone within 4 business days after the clinic has received the results. If you do not hear from us within 7 days, please contact the clinic through Tokopediahart or phone. If you have a critical or abnormal lab result, we will notify you by phone as soon as possible.  Submit refill requests through [employer] or call your pharmacy and they will forward the refill request to us. Please allow 3 business days for your refill to be completed.          Additional Information About Your Visit        Tokopediahar[employer] Storm Information     [employer] gives you secure access to your electronic health record. If you see a primary care provider, you can also send messages to your care team and make appointments. If you have questions, please call your primary care clinic.  If you do not have a primary care provider, please call [phone] and they will assist you.        Care EveryWhere ID     This is your Care EveryWhere ID. This could be used by other organizations to access your [place] medical records  [id number]        Your Vitals Were     Pulse Temperature Last Period BMI (Body Mass Index)          75 98.3  F (36.8  C) (Oral) 02/01/2018 25.83 kg/m2         Blood Pressure from Last 3 Encounters:   02/06/18 131/64   10/25/17 113/53   07/26/17 111/71    Weight from Last 3 Encounters:   02/06/18 146 lb 11.2 oz (66.5 kg)   10/25/17 151 lb 12.8 oz (68.9 kg)   07/26/17 144 lb 14.4 oz (65.7 kg)              We Performed the Following     Hemoglobin        Primary Care Provider Office Phone # Fax #    JEREMY Alberts [phone] [address] [facility] [address]        Equal Access to Services     JAI FLORES : Nahed Pierce, estee moran, britton ahuja  jillian funesroscoesummer la'aan ah. Korin [facility] [phone].    ATENCIÓN: Si habla josie, tiene a coats disposición servicios gratuitos de asistencia lingüística. Martha al [phone].    We comply with applicable federal civil rights laws and [place] laws. We do not discriminate on the basis of race, color, national origin, age, disability, sex, sexual orientation, or gender identity.            Thank you!     Thank you for choosing [facility]  for your care. Our goal is always to provide you with excellent care. Hearing back from our patients is one way we can continue to improve our services. Please take a few minutes to complete the written survey that you may receive in the mail after your visit with us. Thank you!             Your Updated Medication List - Protect others around you: Learn how to safely use, store and throw away your medicines at www.disposemymeds.org.          This list is accurate as of 2/6/18  1:41 PM.  Always use your most recent med list.                   Brand Name Dispense Instructions for use Diagnosis    IRON SUPPLEMENT PO       Excessive or frequent menstruation, Intramural leiomyoma of uterus       PHENYLADE ESSENTIAL DRINK MIX Pack     62 packet    Take 2 Packages by mouth daily    Classical phenylketonuria (H)       prenatal multivitamin plus iron 27-0.8 MG Tabs per tablet      Take 1 tablet by mouth daily        sapropterin dihydrochloride 100 MG Tbso    KUVAN    360 tablet    Take 13 tablets by mouth once daily with food.    Phenylketonuria (PKU) (H)

## 2018-02-06 NOTE — PROGRESS NOTES
CC: heavy period    HPI: Laila Cueva is a 32 year old  who is here to discuss her most recent period.  She started her period on  and it was normal flow, then 2 days ago () she had a very heavy day, soaking through overnight pads about every 3-4 hours with increased cramping that radiated down her legs.  After that, the bleeding improved and is tapering down, almost gone.  The cramping is improved as well.  She does feel a little dizzy, but that is typical during her period.  She has started daily iron supplements. She has a paraguard IUD that was placed about 6 months ago and she has liked it thus far.  She came in because her mom and aunts all have fibroids and endometriosis, so she is worried about that.  She is otherwise without complaints.    ROS:  C: NEGATIVE for fever, chills, change in weight  I: NEGATIVE for worrisome rashes, moles or lesions  E: NEGATIVE for vision changes or irritation  E/M: NEGATIVE for ear, mouth and throat problems  R: NEGATIVE for significant cough or SOB  CV: NEGATIVE for chest pain, palpitations or peripheral edema  GI: NEGATIVE for nausea, abdominal pain, heartburn, or change in bowel habits  : NEGATIVE for frequency, dysuria, hematuria, vaginal discharge  M: NEGATIVE for significant arthralgias or myalgia  N: NEGATIVE for weakness, dizziness or paresthesias  E: NEGATIVE for temperature intolerance, skin/hair changes  P: NEGATIVE for changes in mood or affect    Past Medical History:   Diagnosis Date     Atypical PKU (H)     since birth, manage with diet      Raynaud phenomenon     affects fingers and toes     Von Willebrand disease (H)     affects mainly menses     Von Willebrand disease (H)      Past Surgical History:   Procedure Laterality Date     BIOPSY OF SKIN LESION      2 benign nevus removed      SECTION N/A 2017    Procedure:  SECTION;  Surgeon: Ana Maria Israel MD;  [facility]TRA[facility] W/FORCEP          Obstetric History       T1      L1     SAB0   TAB0   Ectopic0   Multiple0   Live Births1       # Outcome Date GA Lbr Tarik/2nd Weight Sex Delivery Anes PTL Lv   1 Term 17 39w5d  8 lb 12 oz (3.969 kg) M CS-LTranv EPI  JUMA      Name: REJI ARMSTRONG      Apgar1:  8                Apgar5: 9           Allergies   Allergen Reactions     Aspartame      Nuts Other (See Comments)         Current Outpatient Prescriptions:      Ferrous Sulfate (IRON SUPPLEMENT PO), , Disp: , Rfl:      sapropterin dihydrochloride (KUVAN) 100 MG TBSO, Take 13 tablets by mouth once daily with food., Disp: 360 tablet, Rfl: 11     Nutritional Supplements (PHENYLADE ESSENTIAL DRINK MIX) PACK, Take 2 Packages by mouth daily, Disp: 62 packet, Rfl: 3     Prenatal Vit-Fe Fumarate-FA (PRENATAL MULTIVITAMIN  PLUS IRON) 27-0.8 MG TABS, Take 1 tablet by mouth daily, Disp: , Rfl:   No current facility-administered medications for this visit.     Facility-Administered Medications Ordered in Other Visits:      lidocaine-EPINEPHrine 1.5 %-1:197667 injection, , EPIDURAL, PRN, Supriya Mann MD, 3 mL at 17 0234     bupivacaine (MARCAINE) 0.125 % injection (diluted from stock concentration by MD or CRNA), , EPIDURAL, PRMackenzie MESA Lida Cristina, MD, 5 mL at 17 0243    Social History     Social History     Marital status:      Spouse name: N/A     Number of children: N/A     Years of education: 16     Occupational History      [employer]     Social History Main Topics     Smoking status: Never Smoker     Smokeless tobacco: Never Used     Alcohol use No     Drug use: No     Sexual activity: Yes     Partners: Male     Birth control/ protection: None     Other Topics Concern      Service No     Blood Transfusions No     Caffeine Concern No     Occupational Exposure No     Hobby Hazards No     Sleep Concern No     Stress Concern No     Weight Concern No     Special Diet Yes     protein  in diet for PKU     Back Care No     Exercise No     Bike Helmet Yes     Seat Belt Yes     Self-Exams No     does regular skin exams     Social History Narrative    Caffeine intake/servings daily - 0    Calcium intake/servings daily - 3    Exercise 4 times weekly - describe ; walks, kickball,runs    Sunscreen used - Yes    Seatbelts used - Yes    Guns stored in the home - No    Self Breast Exam - Yes    Pap test up to date -  Yes    Eye exam up to date -  Yes    Dental exam up to date -  Yes    DEXA scan up to date -  No    Flex Sig/Colonoscopy up to date -  No    Mammography up to date -  No    Immunizations reviewed and up to date - Yes    Abuse: Current or Past (Physical, Sexual or Emotional) - No    Do you feel safe in your environment - Yes    Do you cope well with stress - Yes    Do you suffer from insomnia - No    Last updated by: Amina Grant  2016             Family History   Problem Relation Age of Onset     Anemia Mother      Myocardial Infarction Father 64     alive     Hypertension Father      Melanoma Maternal Grandmother       from another cancer type     Anemia Maternal Grandmother      Past medical, surgical, social and family history were reviewed and updated in EPIC.    PE: /64  Pulse 75  Temp 98.3  F (36.8  C) (Oral)  Wt 146 lb 11.2 oz (66.5 kg)  LMP 2018  BMI 25.83 kg/m2    Gen: Healthy appearing female in NAD    No further exam done    A/P: Menorrhagia   We discussed that she does have a small fibroid seen during her pregnancy, 1.3cm.  It is possible it has grown and is starting to effect her periods.  The paraguard IUD may also be contributing to the heavier flow and cramping.  We discussed that this may also just be a one time phenomenon and next period goes back to normal, but reassured her that no matter what, nothing sounds overly worrisome.  We will check hgb today and schedule her for an us to recheck fibroid and IUD.  If all normal, she may  consider having the paraguard replaced with a mirena.    SANTOS ABBASI MD      This visit lasted approximately 20 minutes and >50% of the time was spent on counseling about heavy period.

## 2018-02-12 ENCOUNTER — RADIANT APPOINTMENT (OUTPATIENT)
Dept: ULTRASOUND IMAGING | Facility: CLINIC | Age: 33
End: 2018-02-12
Attending: OBSTETRICS & GYNECOLOGY
Payer: COMMERCIAL

## 2018-02-12 DIAGNOSIS — D25.1 INTRAMURAL LEIOMYOMA OF UTERUS: ICD-10-CM

## 2018-02-12 DIAGNOSIS — N92.0 EXCESSIVE OR FREQUENT MENSTRUATION: ICD-10-CM

## 2018-02-12 PROCEDURE — 76830 TRANSVAGINAL US NON-OB: CPT | Performed by: OBSTETRICS & GYNECOLOGY

## 2018-03-08 ENCOUNTER — HOME INFUSION (PRE-WILLOW HOME INFUSION) (OUTPATIENT)
Dept: PHARMACY | Facility: CLINIC | Age: 33
End: 2018-03-08

## 2018-03-19 NOTE — PROGRESS NOTES
This is a recent snapshot of the patient's [facility] medical record.  For current drug dose and complete information and questions, call [phone] or In Basket pool, [facility] ([id number])  CSN Number:  [id number]

## 2018-03-27 ENCOUNTER — HOME INFUSION (PRE-WILLOW HOME INFUSION) (OUTPATIENT)
Dept: PHARMACY | Facility: CLINIC | Age: 33
End: 2018-03-27

## 2018-03-27 NOTE — PROGRESS NOTES
Therapy: PKU Enteral  Insurance: BCBS  Ded: $900  Met: $0    Co-Insurance: 80%  Max Out of Pocket: $3900  Met: $129    Please contact Intake with any questions, [phone] or In Basket pool, [facility] ([id number]).  In reference to clinic referral made on 3/27/18 to check IV ABX Enteral coverage

## 2018-03-28 NOTE — PROGRESS NOTES
This is a recent snapshot of the patient's [facility] medical record.  For current drug dose and complete information and questions, call [phone] or In Basket pool, [facility] ([id number])  CSN Number:  [id number]

## 2018-04-11 ENCOUNTER — OFFICE VISIT (OUTPATIENT)
Dept: OBGYN | Facility: CLINIC | Age: 33
End: 2018-04-11
Payer: COMMERCIAL

## 2018-04-11 VITALS
DIASTOLIC BLOOD PRESSURE: 62 MMHG | HEART RATE: 76 BPM | BODY MASS INDEX: 25.6 KG/M2 | TEMPERATURE: 98.5 F | SYSTOLIC BLOOD PRESSURE: 109 MMHG | WEIGHT: 145.4 LBS

## 2018-04-11 DIAGNOSIS — Z30.432 ENCOUNTER FOR IUD REMOVAL: Primary | ICD-10-CM

## 2018-04-11 PROBLEM — Z30.430 ENCOUNTER FOR IUD INSERTION: Status: RESOLVED | Noted: 2017-07-26 | Resolved: 2018-04-11

## 2018-04-11 LAB — BETA HCG QUAL IFA URINE: NEGATIVE

## 2018-04-11 PROCEDURE — 84703 CHORIONIC GONADOTROPIN ASSAY: CPT | Performed by: OBSTETRICS & GYNECOLOGY

## 2018-04-11 PROCEDURE — 58301 REMOVE INTRAUTERINE DEVICE: CPT | Performed by: OBSTETRICS & GYNECOLOGY

## 2018-04-11 NOTE — PROGRESS NOTES
S; Laila Cueva is a 32 year old  who is here for her IUD removal.  She had a paraguard IUD and continues to have really heavy periods that are painful and would like it out.  Her son is 15 months old and they are considering trying for number 2, so she will plan to use condoms for now and if they decide to hold off on the second baby, she may RTC for mirena IUD placement.  She is otherwise without complaints.    O: /62  Pulse 76  Temp 98.5  F (36.9  C) (Oral)  Wt 145 lb 6.4 oz (66 kg)  LMP 2018 (Exact Date)  BMI 25.6 kg/m2    Psych: normal affect, appropriate eye contact  Resp: no increased work of breathing  CV: no peripheral edema  Abd; SNT, no palpable masses  Lymph: no enlarged inquinal nodes  Pelvic: normal vulva and vagina.  Cervix without lesions.  IUD strings visible.  Small amount dark menstrual blood in vault.  Skin: no visible rashes or lesions.    Procedure:  After verbal consent was obtained, the IUD strings were grasped with a ring forcep, and the IUD was easily removed.  No bleeding was seen after removal.  The speculum was removed. She tolerated the procedure well.    A/P: IUD removal   No issues with removal.  Begin PNV.  RTC if desired mirena IUD.    SANTOS ABBASI MD

## 2018-04-11 NOTE — MR AVS SNAPSHOT
After Visit Summary   4/11/2018    Laila Cueva    MRN: [id number]           Patient Information     Date Of Birth          1985        Visit Information        Provider Department      4/11/2018 3:45 PM Erika De La Paz MD [facility]        Today's Diagnoses     Encounter for IUD removal    -  1       Follow-ups after your visit        Who to contact     If you have questions or need follow up information about today's clinic visit or your schedule please contact [facility] directly at [phone].  Normal or non-critical lab and imaging results will be communicated to you by CloudRunner I/Ohart, letter or phone within 4 business days after the clinic has received the results. If you do not hear from us within 7 days, please contact the clinic through SkyStemt or phone. If you have a critical or abnormal lab result, we will notify you by phone as soon as possible.  Submit refill requests through [employer] or call your pharmacy and they will forward the refill request to us. Please allow 3 business days for your refill to be completed.          Additional Information About Your Visit        MyChart Information     [employer] gives you secure access to your electronic health record. If you see a primary care provider, you can also send messages to your care team and make appointments. If you have questions, please call your primary care clinic.  If you do not have a primary care provider, please call [phone] and they will assist you.        Care EveryWhere ID     This is your Care EveryWhere ID. This could be used by other organizations to access your [facility] medical records  [id number]        Your Vitals Were     Pulse Temperature Last Period BMI (Body Mass Index)          76 98.5  F (36.9  C) (Oral) 03/30/2018 (Exact Date) 25.6 kg/m2         Blood Pressure from Last 3 Encounters:   04/11/18 109/62   02/06/18 131/64   10/25/17 113/53    Weight from Last 3 Encounters:    04/11/18 145 lb 6.4 oz (66 kg)   02/06/18 146 lb 11.2 oz (66.5 kg)   10/25/17 151 lb 12.8 oz (68.9 kg)              We Performed the Following     Beta HCG qual IFA urine     REMOVE INTRAUTERINE DEVICE        Primary Care Provider Office Phone # Fax #    JEREMY Alberts [phone] [address] [facility] [address]        Equal Access to Services     JAI FLORES : Hadii aad ku hadasho Soomaali, waaxda luqadaha, qaybta kaalmada adeegyada, waxay idiin hayaan adeeg kharash latara . So [facility] [phone].    ATENCIÓN: Si habla español, tiene a coats disposición servicios gratuitos de asistencia lingüística. [facility] [phone].    We comply with applicable federal civil rights laws and [place] laws. We do not discriminate on the basis of race, color, national origin, age, disability, sex, sexual orientation, or gender identity.            Thank you!     Thank you for choosing [facility]  for your care. Our goal is always to provide you with excellent care. Hearing back from our patients is one way we can continue to improve our services. Please take a few minutes to complete the written survey that you may receive in the mail after your visit with us. Thank you!             Your Updated Medication List - Protect others around you: Learn how to safely use, store and throw away your medicines at www.disposemymeds.org.          This list is accurate as of 4/11/18  4:41 PM.  Always use your most recent med list.                   Brand Name Dispense Instructions for use Diagnosis    IRON SUPPLEMENT PO       Excessive or frequent menstruation, Intramural leiomyoma of uterus       PHENYLADE ESSENTIAL DRINK MIX Pack     62 packet    Take 2 Packages by mouth daily    Classical phenylketonuria (H)       prenatal multivitamin plus iron 27-0.8 MG Tabs per tablet      Take 1 tablet by mouth daily        sapropterin dihydrochloride 100 MG Tbso    KUVAN    360 tablet     Take 13 tablets by mouth once daily with food.    Phenylketonuria (PKU) (H)

## 2018-04-19 DIAGNOSIS — E70.1 PHENYLKETONURIA (PKU) (H): ICD-10-CM

## 2018-07-04 ENCOUNTER — OFFICE VISIT (OUTPATIENT)
Dept: URGENT CARE | Facility: URGENT CARE | Age: 33
End: 2018-07-04
Payer: COMMERCIAL

## 2018-07-04 VITALS
BODY MASS INDEX: 26.24 KG/M2 | OXYGEN SATURATION: 99 % | HEART RATE: 74 BPM | WEIGHT: 149 LBS | DIASTOLIC BLOOD PRESSURE: 80 MMHG | SYSTOLIC BLOOD PRESSURE: 108 MMHG | TEMPERATURE: 97.3 F

## 2018-07-04 DIAGNOSIS — S09.93XA FACIAL INJURY, INITIAL ENCOUNTER: Primary | ICD-10-CM

## 2018-07-04 PROCEDURE — 99214 OFFICE O/P EST MOD 30 MIN: CPT | Performed by: FAMILY MEDICINE

## 2018-07-04 NOTE — PROGRESS NOTES
SUBJECTIVE:  Chief Complaint   Patient presents with     Urgent Care     Nose Problem     Pt states x 3 days ago son fell on her face.      Laila Cueva is a 33 year old female who presents with a chief complaint of moderate facial pain.  Symptoms began 3 day(s) ago, are moderate and improving  Context:  Injury:Yes: as above,  dropped child on bed, accidentally striking wife's nose in the process.  Pain exacerbated by direct pressure.   Ice and advil helps.   Breathing ok    Has some mild headache and nausea, has some clear nasal drainage   No photophobia   Hx of vWF    Past Medical History:   Diagnosis Date     Atypical PKU (H)     since birth, manage with diet      Raynaud phenomenon     affects fingers and toes     Von Willebrand disease (H)     affects mainly menses     Von Willebrand disease (H)      Current Outpatient Prescriptions   Medication Sig Dispense Refill     Ferrous Sulfate (IRON SUPPLEMENT PO)        Nutritional Supplements (PHENYLADE ESSENTIAL DRINK MIX) PACK Take 2 Packages by mouth daily 62 packet 3     Prenatal Vit-Fe Fumarate-FA (PRENATAL MULTIVITAMIN  PLUS IRON) 27-0.8 MG TABS Take 1 tablet by mouth daily       sapropterin dihydrochloride (KUVAN) 100 MG TBSO Take 13 tablets by mouth once daily with food. 360 tablet 11     Social History   Substance Use Topics     Smoking status: Never Smoker     Smokeless tobacco: Never Used     Alcohol use No     ROS:  CONSTITUTIONAL:NEGATIVE for fever, chills, change in weight  INTEGUMENTARY/SKIN: bruising around the nose, mild    EXAM:   /80 (BP Location: Right arm, Patient Position: Chair, Cuff Size: Adult Regular)  Pulse 74  Temp 97.3  F (36.3  C) (Tympanic)  Wt 149 lb (67.6 kg)  SpO2 99%  BMI 26.24 kg/m2  M/S Exam:nose slightly leftward deviated with swelling and bruising  No bleeding noted    GENERAL APPEARANCE: healthy, alert and no distress  EXTREMITIES: peripheral pulses normal  SKIN: no suspicious lesions or rashes  NEURO:  Normal strength and tone, sensory exam grossly normal, mentation intact and speech normal  EYES: PERRLA, EOMI    X-RAY was done.    ASSESSMENT:     Encounter Diagnosis   Name Primary?     Facial injury, initial encounter Yes     Discussed csf leak from facial trauma and need for ENT eval.   Consultation.   Monitor for worsening sx   Headache, etc and to ed if worsens    Greater than 25 minutes spent with patient and family discussing risks and benefits and management with possible outcomes regarding this issue with greater than 50% in counseling for medical decision making and coordination of care.

## 2018-07-04 NOTE — MR AVS SNAPSHOT
After Visit Summary   7/4/2018    Laila Cueva    MRN: [id number]           Patient Information     Date Of Birth          1985        Visit Information        Provider Department      7/4/2018 2:50 PM Marco Galaviz MD [facility]        Today's Diagnoses     Facial injury, initial encounter    -  1       Follow-ups after your visit        Additional Services     OTOLARYNGOLOGY REFERRAL       Your provider has referred you to: N: [facility] [place] [phone]   [email or web address]    Please be aware that coverage of these services is subject to the terms and limitations of your health insurance plan.  Call member services at your health plan with any benefit or coverage questions.      Please bring the following with you to your appointment:    (1) Any X-Rays, CTs or MRIs which have been performed.  Contact the facility where they were done to arrange for  prior to your scheduled appointment.   (2) List of current medications  (3) This referral request   (4) Any documents/labs given to you for this referral                  Who to contact     If you have questions or need follow up information about today's clinic visit or your schedule please contact [facility] directly at [phone].  Normal or non-critical lab and imaging results will be communicated to you by MyChart, letter or phone within 4 business days after the clinic has received the results. If you do not hear from us within 7 days, please contact the clinic through Knovelhart or phone. If you have a critical or abnormal lab result, we will notify you by phone as soon as possible.  Submit refill requests through Vixar or call your pharmacy and they will forward the refill request to us. Please allow 3 business days for your refill to be completed.          Additional Information About Your Visit        Knovelhart Information     [employer] gives you secure  access to your electronic health record. If you see a primary care provider, you can also send messages to your care team and make appointments. If you have questions, please call your primary care clinic.  If you do not have a primary care provider, please call [phone] and they will assist you.        Care EveryWhere ID     This is your Care EveryWhere ID. This could be used by other organizations to access your [place] medical records  [id number]        Your Vitals Were     Pulse Temperature Pulse Oximetry BMI (Body Mass Index)          74 97.3  F (36.3  C) (Tympanic) 99% 26.24 kg/m2         Blood Pressure from Last 3 Encounters:   07/04/18 108/80   04/11/18 109/62   02/06/18 131/64    Weight from Last 3 Encounters:   07/04/18 149 lb (67.6 kg)   04/11/18 145 lb 6.4 oz (66 kg)   02/06/18 146 lb 11.2 oz (66.5 kg)              We Performed the Following     OTOLARYNGOLOGY REFERRAL        Primary Care Provider Office Phone # Fax #    Billie Phillips Reid, APRN [facility] [phone] [address] [phone]        Equal Access to Services     JAI FLORES AH: Hadii abhi ku hadasho Soomaali, waaxda luqadaha, qaybta kaalmada adeegyada, jillian burgess. So [facility] [phone].    ATENCIÓN: Si habla español, tiene a coats disposición servicios gratuitos de asistencia lingüística. Llame al [phone].    We comply with applicable federal civil rights laws and [place] laws. We do not discriminate on the basis of race, color, national origin, age, disability, sex, sexual orientation, or gender identity.            Thank you!     Thank you for choosing [facility]  for your care. Our goal is always to provide you with excellent care. Hearing back from our patients is one way we can continue to improve our services. Please take a few minutes to complete the written survey that you may receive in the mail after your visit with us. Thank you!              Your Updated Medication List - Protect others around you: Learn how to safely use, store and throw away your medicines at www.disposemymeds.org.          This list is accurate as of 7/4/18  3:20 PM.  Always use your most recent med list.                   Brand Name Dispense Instructions for use Diagnosis    IRON SUPPLEMENT PO       Excessive or frequent menstruation, Intramural leiomyoma of uterus       PHENYLADE ESSENTIAL DRINK MIX Pack     62 packet    Take 2 Packages by mouth daily    Classical phenylketonuria (H)       prenatal multivitamin plus iron 27-0.8 MG Tabs per tablet      Take 1 tablet by mouth daily        sapropterin dihydrochloride 100 MG Tbso    KUVAN    360 tablet    Take 13 tablets by mouth once daily with food.    Phenylketonuria (PKU) (H)

## 2018-09-10 ENCOUNTER — TELEPHONE (OUTPATIENT)
Dept: PEDIATRICS | Facility: CLINIC | Age: 33
End: 2018-09-10

## 2018-09-10 NOTE — TELEPHONE ENCOUNTER
[facility] Prior Authorization Team   Phone: [phone]  Fax: [phone]  PA Initiation    Medication: KUVAN- PROACTIVE PA PENDING  Insurance Company: [facility] - Phone [phone] Fax [phone]  Pharmacy Filling the Rx: [facility] - [place], [place] - [address]  Filling Pharmacy Phone: [phone]  Filling Pharmacy Fax: [phone]  Start Date: 9/10/2018

## 2018-09-10 NOTE — TELEPHONE ENCOUNTER
[facility] Prior Authorization Team   Phone: [phone]  Fax: [phone]  Prior Authorization Approval    Authorization Effective Date: 9/22/2018  Authorization Expiration Date: 9/22/2019  Medication: KUVAN- PROACTIVE PA APPROVED  Approved Dose/Quantity: 390/ 30ds  Reference #:    Insurance Company: [employer] [place] - Phone [phone] Fax [phone]  Expected CoPay:       CoPay Card Available: Yes   Foundation Assistance Needed: THERAPY FIRST COPAY CARD  Which Pharmacy is filling the prescription (Not needed for infusion/clinic administered): [facility] - [place], [place] - [facility] [address]  Pharmacy Notified: Yes  Patient Notified: Yes

## 2018-12-03 ENCOUNTER — TRANSFERRED RECORDS (OUTPATIENT)
Dept: HEALTH INFORMATION MANAGEMENT | Facility: CLINIC | Age: 33
End: 2018-12-03

## 2019-01-11 ENCOUNTER — OFFICE VISIT (OUTPATIENT)
Dept: FAMILY MEDICINE | Facility: CLINIC | Age: 34
End: 2019-01-11
Payer: COMMERCIAL

## 2019-01-11 VITALS
DIASTOLIC BLOOD PRESSURE: 76 MMHG | BODY MASS INDEX: 25.36 KG/M2 | OXYGEN SATURATION: 99 % | HEART RATE: 92 BPM | SYSTOLIC BLOOD PRESSURE: 111 MMHG | RESPIRATION RATE: 16 BRPM | TEMPERATURE: 97.4 F | WEIGHT: 144 LBS

## 2019-01-11 DIAGNOSIS — J01.01 ACUTE RECURRENT MAXILLARY SINUSITIS: Primary | ICD-10-CM

## 2019-01-11 PROCEDURE — 99213 OFFICE O/P EST LOW 20 MIN: CPT | Performed by: NURSE PRACTITIONER

## 2019-01-11 NOTE — PROGRESS NOTES
SUBJECTIVE:   Laila Cueva is a 33 year old female who presents to clinic today for the following health issues:      RESPIRATORY SYMPTOMS      Duration: since Dec 29 th, 2018    Description  nasal congestion, facial pain/pressure, cough and headache    Severity: severe    Accompanying signs and symptoms: see above    History (predisposing factors):  none    Precipitating or alleviating factors: None    Therapies tried and outcome:  oral decongestant antihistamine      Problem list and histories reviewed & adjusted, as indicated.  Additional history: as documented    Reviewed and updated as needed this visit by clinical staff  Tobacco  Allergies  Meds  Problems  Med Hx  Surg Hx  Fam Hx  Soc Hx        Reviewed and updated as needed this visit by Provider  Tobacco  Allergies  Meds  Problems  Med Hx  Surg Hx  Fam Hx         ROS:  Constitutional, HEENT, cardiovascular, pulmonary, gi and gu systems are negative, except as otherwise noted.    OBJECTIVE:     /76 (BP Location: Right arm, Patient Position: Sitting, Cuff Size: Adult Regular)   Pulse 92   Temp 97.4  F (36.3  C) (Oral)   Resp 16   Wt 65.3 kg (144 lb)   LMP 12/21/2018 (Approximate)   SpO2 99%   BMI 25.36 kg/m    Body mass index is 25.36 kg/m .  GENERAL: healthy, alert and no distress  EYES: Bilateral frontal and maxillary sinus tenderness Eyes grossly normal to inspection, PERRL and conjunctivae and sclerae normal  HENT: ear canals and TM's normal, nose and mouth without ulcers or lesions  NECK: no adenopathy, no asymmetry, masses, or scars and thyroid normal to palpation  RESP: lungs clear to auscultation - no rales, rhonchi or wheezes  CV: regular rate and rhythm, normal S1 S2, no S3 or S4, no murmur, click or rub, no peripheral edema and peripheral pulses strong  MS: no gross musculoskeletal defects noted, no edema  SKIN: no suspicious lesions or rashes      ASSESSMENT/PLAN:       ICD-10-CM    1. Acute recurrent maxillary  sinusitis J01.01 amoxicillin-clavulanate (AUGMENTIN) 875-125 MG tablet     I discussed the pathophysiology of sinus pressure/sinusitis and treatment options with emphasis on healty lifestyle and opening up natural drainage passages.  I discussed the risks and benefits of various over the counter and prescription treatments including short courses of decongestant nasal sprays.  Recheck if not improving as expected        JEREMY Alberts CNP  [facility]

## 2019-01-11 NOTE — PATIENT INSTRUCTIONS
Sinus pressure is primarily a problem of drainage.  You can best help your body clear the sinus secretions and pressure by opening up the natural passageways which are often blocked by viral colds and allergies.    Short courses of a nasal decongestant spray (Afrin or Neosinephrine) are one of the most effective tools in opening sinus drainage passageways.  Their use should be restricted to 3 days though due to the high risk of nasal addiction.  Pseudoephedrine (Sudafed) is often helpful but it can cause elevations in blood pressure and insomnia.  Dextromethorphan/guaifenesin combinations help loosen secretions and often help make the mucous more liquid and easier to clear.    For pain and fevers, acetaminophen (Tylenol) is most appropriate.  Ibuprofen (Advil) or naproxen (Aleve) are useful too and last longer but they can cause elevation of blood pressure or stomach problems.    Antihistamines (Benadryl, Dimetapp, etc.) cause sedation, confusion, bowel and urinary abnormalities and are of little use for infectious causes of cough and nasal congestion.  Their use should be reserved for allergic symptoms.    The body needs to be treated well in order to help heal itself.  Rest as needed.  It is ok to reduce food intake if appetite is poor but it is quite important to maintain/increase fluid intake.  Sinus pressure and infections usually go away on their own with appropriate care.  If symptoms worsen or persist beyond 10 days, an antibiotic might be worth trying to treat a possible bacterial component.

## 2019-01-26 ENCOUNTER — HOME INFUSION (PRE-WILLOW HOME INFUSION) (OUTPATIENT)
Dept: PHARMACY | Facility: CLINIC | Age: 34
End: 2019-01-26

## 2019-02-03 DIAGNOSIS — E70.1 PHENYLKETONURIA (PKU) (H): ICD-10-CM

## 2019-02-03 PROCEDURE — 84510 ASSAY OF TYROSINE: CPT | Performed by: PEDIATRICS

## 2019-02-05 ENCOUNTER — PRENATAL OFFICE VISIT (OUTPATIENT)
Dept: NURSING | Facility: CLINIC | Age: 34
End: 2019-02-05
Payer: COMMERCIAL

## 2019-02-05 VITALS
TEMPERATURE: 98.6 F | WEIGHT: 147.6 LBS | DIASTOLIC BLOOD PRESSURE: 67 MMHG | BODY MASS INDEX: 26.15 KG/M2 | HEIGHT: 63 IN | SYSTOLIC BLOOD PRESSURE: 116 MMHG | HEART RATE: 86 BPM

## 2019-02-05 DIAGNOSIS — Z34.90 SUPERVISION OF NORMAL PREGNANCY: Primary | ICD-10-CM

## 2019-02-05 PROBLEM — Z23 NEED FOR TDAP VACCINATION: Status: ACTIVE | Noted: 2019-02-05

## 2019-02-05 LAB
ABO + RH BLD: NORMAL
ABO + RH BLD: NORMAL
ALBUMIN UR-MCNC: NEGATIVE MG/DL
APPEARANCE UR: CLEAR
BILIRUB UR QL STRIP: NEGATIVE
BLD GP AB SCN SERPL QL: NORMAL
BLOOD BANK CMNT PATIENT-IMP: NORMAL
COLOR UR AUTO: YELLOW
ERYTHROCYTE [DISTWIDTH] IN BLOOD BY AUTOMATED COUNT: 14.1 % (ref 10–15)
GLUCOSE UR STRIP-MCNC: NEGATIVE MG/DL
HCT VFR BLD AUTO: 38.5 % (ref 35–47)
HGB BLD-MCNC: 12.9 G/DL (ref 11.7–15.7)
HGB UR QL STRIP: NEGATIVE
KETONES UR STRIP-MCNC: ABNORMAL MG/DL
LEUKOCYTE ESTERASE UR QL STRIP: NEGATIVE
MCH RBC QN AUTO: 29.5 PG (ref 26.5–33)
MCHC RBC AUTO-ENTMCNC: 33.5 G/DL (ref 31.5–36.5)
MCV RBC AUTO: 88 FL (ref 78–100)
NITRATE UR QL: NEGATIVE
PH UR STRIP: 5.5 PH (ref 5–7)
PLATELET # BLD AUTO: 165 10E9/L (ref 150–450)
RBC # BLD AUTO: 4.37 10E12/L (ref 3.8–5.2)
SOURCE: ABNORMAL
SP GR UR STRIP: <=1.005 (ref 1–1.03)
SPECIMEN EXP DATE BLD: NORMAL
UROBILINOGEN UR STRIP-ACNC: 0.2 EU/DL (ref 0.2–1)
WBC # BLD AUTO: 8.1 10E9/L (ref 4–11)

## 2019-02-05 PROCEDURE — 87086 URINE CULTURE/COLONY COUNT: CPT | Performed by: OBSTETRICS & GYNECOLOGY

## 2019-02-05 PROCEDURE — 85027 COMPLETE CBC AUTOMATED: CPT | Performed by: OBSTETRICS & GYNECOLOGY

## 2019-02-05 PROCEDURE — 81003 URINALYSIS AUTO W/O SCOPE: CPT | Performed by: OBSTETRICS & GYNECOLOGY

## 2019-02-05 PROCEDURE — 86901 BLOOD TYPING SEROLOGIC RH(D): CPT | Performed by: OBSTETRICS & GYNECOLOGY

## 2019-02-05 PROCEDURE — 86762 RUBELLA ANTIBODY: CPT | Performed by: OBSTETRICS & GYNECOLOGY

## 2019-02-05 PROCEDURE — 86780 TREPONEMA PALLIDUM: CPT | Performed by: OBSTETRICS & GYNECOLOGY

## 2019-02-05 PROCEDURE — 86850 RBC ANTIBODY SCREEN: CPT | Performed by: OBSTETRICS & GYNECOLOGY

## 2019-02-05 PROCEDURE — 99207 ZZC NO CHARGE NURSE ONLY: CPT

## 2019-02-05 PROCEDURE — 36415 COLL VENOUS BLD VENIPUNCTURE: CPT | Performed by: OBSTETRICS & GYNECOLOGY

## 2019-02-05 PROCEDURE — 86900 BLOOD TYPING SEROLOGIC ABO: CPT | Performed by: OBSTETRICS & GYNECOLOGY

## 2019-02-05 PROCEDURE — 87340 HEPATITIS B SURFACE AG IA: CPT | Performed by: OBSTETRICS & GYNECOLOGY

## 2019-02-05 PROCEDURE — 87389 HIV-1 AG W/HIV-1&-2 AB AG IA: CPT | Performed by: OBSTETRICS & GYNECOLOGY

## 2019-02-05 SDOH — SOCIAL STABILITY: SOCIAL INSECURITY: WITHIN THE LAST YEAR, HAVE YOU BEEN HUMILIATED OR EMOTIONALLY ABUSED IN OTHER WAYS BY YOUR PARTNER OR EX-PARTNER?: NO

## 2019-02-05 SDOH — SOCIAL STABILITY: SOCIAL INSECURITY
WITHIN THE LAST YEAR, HAVE YOU BEEN KICKED, HIT, SLAPPED, OR OTHERWISE PHYSICALLY HURT BY YOUR PARTNER OR EX-PARTNER?: NO

## 2019-02-05 SDOH — SOCIAL STABILITY: SOCIAL INSECURITY: WITHIN THE LAST YEAR, HAVE YOU BEEN AFRAID OF YOUR PARTNER OR EX-PARTNER?: NO

## 2019-02-05 SDOH — SOCIAL STABILITY: SOCIAL NETWORK: ARE YOU MARRIED, WIDOWED, DIVORCED, SEPARATED, NEVER MARRIED, OR LIVING WITH A PARTNER?: MARRIED

## 2019-02-05 SDOH — SOCIAL STABILITY: SOCIAL INSECURITY
WITHIN THE LAST YEAR, HAVE TO BEEN RAPED OR FORCED TO HAVE ANY KIND OF SEXUAL ACTIVITY BY YOUR PARTNER OR EX-PARTNER?: NO

## 2019-02-05 SDOH — HEALTH STABILITY: PHYSICAL HEALTH: ON AVERAGE, HOW MANY DAYS PER WEEK DO YOU ENGAGE IN MODERATE TO STRENUOUS EXERCISE (LIKE A BRISK WALK)?: 5 DAYS

## 2019-02-05 SDOH — HEALTH STABILITY: MENTAL HEALTH
STRESS IS WHEN SOMEONE FEELS TENSE, NERVOUS, ANXIOUS, OR CAN'T SLEEP AT NIGHT BECAUSE THEIR MIND IS TROUBLED. HOW STRESSED ARE YOU?: NOT AT ALL

## 2019-02-05 SDOH — HEALTH STABILITY: PHYSICAL HEALTH: ON AVERAGE, HOW MANY MINUTES DO YOU ENGAGE IN EXERCISE AT THIS LEVEL?: 60 MIN

## 2019-02-05 ASSESSMENT — MIFFLIN-ST. JEOR: SCORE: 1353.64

## 2019-02-05 NOTE — PROGRESS NOTES
Patient presents for new ob teaching and labs, second pregnancy. History of C section. Declined first trimester screening. Handouts reviewed and given. Has appointment with PKU provider. Has NOB with Dr De La Paz 3/07/19              Prenatal OB Questionnaire  Patient supplied answers from flow sheet for:  Prenatal OB Questionnaire.  Past Medical History  Diabetes?: No  Hypertension : No  Heart disease, mitral valve prolapse or rheumatic fever?: No  An autoimmune disease such as lupus or rheumatoid arthritis?: No  Kidney disease or urinary tract infection?: No  Epilepsy, seizures or spells?: No  Migraine headaches?: (!) Yes  A stroke or loss of function or sensation?: No  Any other neurological problems?: No  Have you ever been treated for depression?: (!) Yes  Are you having problems with crying spells or loss of self-esteem?: No  Have you ever required psychiatric care?: No  Have you ever had hepatitis, liver disease or jaundice?: No  Have you been treated for blood clots in your veins, deep vein thromosis, inflammation in the veins, thrombosis, phlebitis, pulmonary embolism or varicosities?: No  Have you had excessive bleeding after surgery or dental work?: No  Do you bleed more than other women after a cut or scratch?: No  Do you have a history of anemia?: No  Have you ever had thyroid problems or taken thyroid medication?: No   Do you have any endocrine problems?: No  Have you ever been in a major accident or suffered serious trauma?: No  Within the last year, has anyone hit, slapped, kicked or otherwise hurt you?: No  In the last year, has anyone forced you to have sex when you didn't want to?: No    Past Medical History 2   Have you ever received a blood transfusion?: No  Would you refuse a blood transfusion if a doctor judged it to be medically necessary?: No   If you answered Yes, would you rather die than receive a blood transfusion?: No  If you answered Yes, is this for Methodist reasons?: No  Does anyone in  your home smoke?: No  Do you use tobacco products?: No  Do you drink beer, wine or hard liquor?: No  Do you use any of the following: marijuana, speed, cocaine, heroin, hallucinogens or other drugs?: No   Is your blood type Rh negative?: No  Have you ever had abnormal antibodies in your blood?: No  Have you ever had asthma?: No  Have you ever had tuberculosis?: No  Do you have any allergies to drugs or over-the-counter medications?: (!) Yes(Aspartame)  Allergies: Dust Mites, Aspartame, Ethanol, Venlafaxine, Hydrochloride, Sertraline: No  Have you had any breast problems?: No  Have you ever ?: (!) Yes  Have you had any gynecological surgical procedures such as cervical conization, a LEEP procedure, laser treatment, cryosurgery of the cervix or a dilation and curettage, etc?: No  Have you ever had any other surgical procedures?: (!) Yes(C section)  Have you been hospitalized for a nonsurgical reason excluding normal delivery?: No  Have you ever had any anesthetic complications?: No  Have you ever had an abnormal pap smear?: No    Past Medical History (Continued)  Do you have a history of abnormalities of the uterus?: (!) Yes(tilted uterus)  Did your mother take MICHAEL or any other hormones when she was pregnant with you?: No  Did it take you more than a year to become pregnant?: No  Have you ever been evaluated or treated for infertility?: No  Is there a history of medical problems in your family, which you feel may be important to this pregnancy?: No  Do you have any other problems we have not asked about which you feel may be important to this pregnancy?: No    Symptoms since last menstrual period  Do you have any of the following symptoms: abdominal pain, blood in stools or urine, chest pain, shortness of breath, coughing or vomiting up blood, your heart racing or skipping beats, nausea and vomiting, pain on urination or vaginal discharge or bleed: No  Will the patient be 35 years old or older at the time of  delivery?: No    Has the patient, baby's father or anyone in either family had:  Thalassemia (Italian, Greek, Mediterranean or  background only) and an MCV result less than 80?: No  Neural tube defect such as meningomyelocele, spina bifida or anencephaly?: No  Congenital heart defect?: No  Down's Syndrome?: No  Kedar-Sachs disease (Adventism, Cajun, Filipino-[place])?: No  Sickle cell disease or trait ()?: No  Hemophilia or other inherited problems of blood?: No  Muscular dystrophy?: No  Cystic fibrosis?: No  Ilda's chorea?: No  Mental retardation/autism?: No  If yes, was the person tested for fragile X?: No  Any other inherited genetic or chromosomal disorder?: No  Maternal metabolic disorder (e.g Insulin-dependent diabetes, PKU)?: (!) Yes(patient has PKU)  A child with birth defects not listed above?: No  Recurrent pregnancy loss or stillbirth?: No   Has the patient had any medications/street drugs/alcohol since her last menstrual period?: No  Does the patient or baby's father have any other genetic risks?: No    Infection History   Do you object to being tested for Hepatitis B?: No  Do you object to being tested for HIV?: No   Do you feel that you are at high risk for coming in contact with the AIDS virus?: No  Have you ever been treated for tuberculosis?: No  Have you ever had a positive skin test for tuberculosis?: No  Do you live with someone who has tuberculosis?: No  Have you ever been exposed to tuberculosis?: No  Do you have genital herpes?: No  Does your partner have genital herpes?: No  Have you had a viral illness since your last period?: No  Have you ever had gonorrhea, chlamydia, syphilis, venereal warts, trichomoniasis, pelvic inflammatory disease or any other sexually transmitted disease?: No  Do you know if you are a genital group B streptococcus carrier?: (!) Yes  Have you had chicken pox/varicella?: (!) Yes   Have you been vaccinated against chicken Pox?: (!) Yes  Have you had any  other infectious diseases?: No      Allergies as of 2/5/2019:    Allergies as of 02/05/2019 - Reviewed 02/05/2019   Allergen Reaction Noted     Aspartame  04/16/2014     Nuts Other (See Comments) 05/28/2014     Peanut (diagnostic) Other (See Comments) 05/28/2014       Current medications are:  Current Outpatient Medications   Medication Sig Dispense Refill     Prenatal Vit-Fe Fumarate-FA (PRENATAL MULTIVITAMIN  PLUS IRON) 27-0.8 MG TABS Take 1 tablet by mouth daily       sapropterin dihydrochloride (KUVAN) 100 MG TBSO Take 13 tablets by mouth once daily with food. 360 tablet 11         Early ultrasound screening tool:    Does patient have irregular periods?  No  Did patient use hormonal birth control in the three months prior to positive urine pregnancy test? No  Is the patient breastfeeding?  No  Is the patient 10 weeks or greater at time of education visit?  No

## 2019-02-06 LAB
BACTERIA SPEC CULT: NO GROWTH
HBV SURFACE AG SERPL QL IA: NONREACTIVE
HIV 1+2 AB+HIV1 P24 AG SERPL QL IA: NONREACTIVE
RUBV IGG SERPL IA-ACNC: 45 IU/ML
SPECIMEN SOURCE: NORMAL
T PALLIDUM AB SER QL: NONREACTIVE

## 2019-02-11 ENCOUNTER — TELEPHONE (OUTPATIENT)
Dept: NUTRITION | Facility: CLINIC | Age: 34
End: 2019-02-11

## 2019-02-11 LAB
PHE SERPL-MCNC: 2.3 MG/DL (ref 0.5–1.6)
TYROSINE SERPL-MCNC: 0.8 MG/DL (ref 0.6–2.4)

## 2019-02-11 NOTE — PROGRESS NOTES
PHE result collected 2/3/19 given to patient via email = 2.3 mg/dL. Good level, no changes to diet recommended.

## 2019-02-18 ENCOUNTER — OFFICE VISIT (OUTPATIENT)
Dept: PEDIATRICS | Facility: CLINIC | Age: 34
End: 2019-02-18
Attending: GENETIC COUNSELOR, MS
Payer: COMMERCIAL

## 2019-02-18 ENCOUNTER — ALLIED HEALTH/NURSE VISIT (OUTPATIENT)
Dept: PEDIATRICS | Facility: CLINIC | Age: 34
End: 2019-02-18
Attending: DIETITIAN, REGISTERED
Payer: COMMERCIAL

## 2019-02-18 ENCOUNTER — OFFICE VISIT (OUTPATIENT)
Dept: PEDIATRICS | Facility: CLINIC | Age: 34
End: 2019-02-18
Attending: PEDIATRICS
Payer: COMMERCIAL

## 2019-02-18 VITALS
DIASTOLIC BLOOD PRESSURE: 75 MMHG | WEIGHT: 147 LBS | HEIGHT: 63 IN | BODY MASS INDEX: 26.05 KG/M2 | HEART RATE: 74 BPM | SYSTOLIC BLOOD PRESSURE: 111 MMHG

## 2019-02-18 DIAGNOSIS — Z31.5 ENCOUNTER FOR PROCREATIVE GENETIC COUNSELING: ICD-10-CM

## 2019-02-18 DIAGNOSIS — E70.1 PHENYLKETONURIA (PKU) (H): Primary | ICD-10-CM

## 2019-02-18 DIAGNOSIS — O09.90 HIGH-RISK PREGNANCY, UNSPECIFIED TRIMESTER: Primary | ICD-10-CM

## 2019-02-18 PROCEDURE — 96040 ZZH GENETIC COUNSELING, EACH 30 MINUTES: CPT | Mod: ZF | Performed by: GENETIC COUNSELOR, MS

## 2019-02-18 PROCEDURE — 97803 MED NUTRITION INDIV SUBSEQ: CPT | Mod: ZF | Performed by: DIETITIAN, REGISTERED

## 2019-02-18 PROCEDURE — G0463 HOSPITAL OUTPT CLINIC VISIT: HCPCS | Mod: 25

## 2019-02-18 ASSESSMENT — PAIN SCALES - GENERAL: PAINLEVEL: NO PAIN (0)

## 2019-02-18 ASSESSMENT — MIFFLIN-ST. JEOR: SCORE: 1350.79

## 2019-02-18 NOTE — PROGRESS NOTES
"          Advanced Therapies  [facility] [address]  [address] [facility] [address]  Phone: [phone]  Fax: [phone]  Date: 2019      Patient:  Laila Cueva   :   1985   MRN:     [id number]      Laila Cueva  [address] [place] [address]    Dear Dr. Billie Mathews and Laila Cueva,    CHIEF COMPLAINT:     I had the pleasure of seeing Laila Cueva in the PKU and Maternal PKU Clinic at the [facility] regarding phenylketonuria (PKU). This patient is a 33 year old and comes for evaluation and treatment.      Since the last visit, there have been no hospitalizations, emergency department visits, or other major changes in medical care.    PAST MEDICAL HISTORY:    These list of past medical problems includes:    Patient Active Problem List   Diagnosis     Acne     CARDIOVASCULAR SCREENING; LDL GOAL LESS THAN 160     Dermatofibroma of left lower leg     Phenylketonuria (PKU) (H)     Von Willebrand disease (H)     Intramural leiomyoma of uterus     Need for Tdap vaccination       FAMILY HISTORY: A brief family medical history was reviewed.  MEDICATIONS:   Current Outpatient Medications   Medication Sig     Prenatal Vit-Fe Fumarate-FA (PRENATAL MULTIVITAMIN  PLUS IRON) 27-0.8 MG TABS Take 1 tablet by mouth daily     sapropterin dihydrochloride (KUVAN) 100 MG TBSO Take 13 tablets by mouth once daily with food.     No current facility-administered medications for this visit.      REVIEW OF SYSTEMS: The review of systems negative for new eye, ear, heart, lung, liver, spleen, gastrointestinal, bone, muscle, integumentary, endocrinologic, brain or psychiatric issues except as noted above.  PHYSICAL EXAMINATION:  Demographics: /75   Pulse 74   Ht 5' 2.99\" (160 cm)   Wt 147 lb (66.7 kg)   LMP 2018 (Approximate)   BMI 26.05 kg/m    General: The patient is oriented to person, place and time at an age-appropriate manner.   HEENT: The facial features " are normal and symmetric.   The gaze is conjugate and extraocular motions are full and intact.The pupils are equal, round and reactive to light.  The ears are of normal position and configuration and hearing is grossly normal.  The oropharynx is benign and the tongue protrudes normally without fasciculations.  Neck: The neck is supple with full range of motion  Chest: The chest is of normal configuration and clear by auscultation.   Heart: A normal S1 and S2 are heard without murmurs or gallops.  Abdomen: The abdomen is soft and benign without organomegaly.   Extremities: The extremities are of normal configuration without contractures nor hyperlaxities. Strength and tone appear to be normal and symmetric. Deep tendon reflexes are normal at the biceps, patellar and ankles, and there is no clonus at the ankles.   Integument: The integument is  of normal appearance without significant changes in pigmentation, birthmarks, or lesions.  Neurologic:  Mental Status Exam:  Alert, awake. Fully oriented. No dysarthria, no dysphasia. Speech of normal fluency.  Cranial Nerves:  PERRLA, EOMs intact, no nystagmus, facial movements symmetric. No atrophy or fasciculations.    Motor:  Normal tone in all four extremities, no atrophy or fasciculations. 5/5 strength bilaterally in shoulder abduction, elbow extensors and flexors, , hip flexors, knee extensors and flexors. No tremors.  Sensory:  Negative Romberg.  Reflexes:  2+ and symmetric in biceps, patellar, Achilles; There is no clonus at the ankles.  Gait:  Normal gait; normal arm swing and stance.ankles.    LABORATORY RESULTS: Previous studies showed pathologically elevated blood phenylalanine levels as well as specific PAH mutatons and excluded disorders of biopterin recycling. Laboratory studies from the past year were reviewed.    ASSESSMENT:  1.) Phenylketonuria (PKU).  2.) Excellen control with blood phe levels aiming at levels of 6 mg/dL or lower. Blood phe levels at 1.8  "- 3.5 mg/dl since last visit.  3.) Good response to Kuvan medication.    PLAN/RECOMMENDATIONS:    1.) Send blood \"tyrosine and phenylalanine\" levels monthly.  2.) Metabolic PKU Dietician Consult today for regular diet assessment and nutritional management including the patient's prescribed phenylalanine intake.  3.) Clinical Pharmacotherapy consultation with Pharmacotherapy for Inborn Errors of Metabolism (PIMD) expert regarding sapropterin dihydrochloride medication for PKU.  4.) Continue low phenylalanine diet (250-400 mg phenylalanine/day, or as modified by consultation with the Metabolic PKU Dietician considering recent blood phenylalanine levels, diet history, and impact of sapropterin, if taken).  5.) Continue Kuvan,  6.) Return to PKU Clinic in 12 months.    FOLLOW-UP PLAN:  If you are returning to clinic to review specific laboratory tests, please call the Genetic Counselor (see phone numbers below) to confirm that we have received all of the results from reference laboratories prior to your appointment. If we have not received all of the test results, please discuss re-scheduling your appointment.    I spent 30 minutes face-to-face with the patient reviewing the chief complaint, past medical history, and obtaining a review of systems as well as doing a physical examination; more than 50% of this time was spent in counseling and education regarding Maternal PKU, the availability of the local patient support group with information on the [place] PKU Foundation available at www.mnpku.org as well as the nature of PKU, the prognosis, and the therapies that are available.    With warmest regards,     Quan Monroy PhD, MD  Professor of Pediatrics, and  [place] of Human Genetics    Appointments: [phone]      Monday mornings: Advanced Therapies for Lysosomal Diseases Clinic   Monday afternoons: PKU Clinic, Metabolism Clinic, and Genetics Clinic    Pharmacotherapy Consultant:  Rosemarie Acosta, " PharmD, Pharmacotherapy for Metabolic Disorders (PIMD): [phone]  Eduard Pacheco, PharmD, Pharmacotherapy for Metabolic Disorders (PIMD): [phone]    Genetic Counselor:  Patricia Ayers MS, [facility] (Genetic test Results): [phone]  Annemarie Alfonso MS, GCG, (Genetic test results: [phone])    Metabolic Dietician:  Gay Lopez, Registered Dietician: [phone]  Olamide Keen, Registered Dietician: [phone]    :  SHANNON Sanchez, [facility], Clinical , [phone]    Advanced Therapies Clinic Scheduler:  Maxine Durham, [phone]    Nurse Coordinator, Metabolism and Genetics (afternoon clinics):  ANTIONE ChouN, RN, PHN  [phone]    Copy to Referring Physician and Primary Care Practitioner:  Dr. Billie Mathews, APRN CNP  [address] [place] [address]      Billie Lorenzana  [address] [facility] [address]    Copy  to patient:  Laila Cueva  [address] [place] [address]

## 2019-02-18 NOTE — PROGRESS NOTES
Presenting Information:  Laila Cueva is a 33-year-old female with phenylketonuria (PKU).  Her genotype is unknown.  Laila returned to the [facility] PKU clinic today for a follow-up appointment with Dr. Quan Monroy.  She is currently pregnant, due 9/27/19.  She was accompanied by her  Tray.  Given her current pregnancy, I met with the family at the request of Dr. Monroy to review the genetics and inheritance of PKU, the options available for genetic testing, and the risks of maternal PKU.       Discussion:  As discussed at Laila's last visit, genes are long stretches of DNA that are responsible for how our bodies look and how our bodies work. We all have two copies of every gene; one inherited from the mother and one inherited from the father. When there is a change, called a mutation, in a gene it can cause it to not do its job correctly which can cause the signs and symptoms of a genetic condition.      PKU is caused by mutations in a gene called PAH. The PAH gene is normally important for creating an enzyme that breaks down phenylalanine (Phe). Mutations in the PAH gene cause this enzyme to not do its job the way it is supposed to, resulting in phenylalanine not being broken down effectively. This is toxic to the cells and unless a low phe diet is followed, has serious health and developmental consequences.      PKU is inherited in an autosomal recessive pattern. This means that to be affected an individual must inherit a mutation in both copies of the PAH gene (one from each parent). Individuals with just one mutation in the PAH gene are said to be carriers. Carriers do not have PKU but can have an affected child if their partner is also a carrier (or affected themselves).          Laila has not previously had genetic testing for PKU.  We discussed why this can be important.  The specific mutations a patient carries helps predict residual enzyme function which in turn can help  predict disease severity.  This can help us determine how much dietary phe Laila can tolerate.  Determining a patient's genotype also helps predict whether they will respond to the medication Kuvan which can be an important tool in lowering phe levels.  Finally, this information is necessary if Laila wishes to pursue prenatal testing for PKU if her partner is found to be a carrier.  Laila felt comfortable declining genetic testing today.       Because both of Laila's PAH genes have a mutation, no matter which copy she passed down to this pregnancy we know the baby is a carrier for PKU.  Whether this pregnancy could actually have PKU depends on if Laila's  Tray is a carrier.  If he is, there is a 50% chance for the pregnancy to be affected.  If he is not a carrier, the chance for this pregnancy to be affected is extremely low.  Without any information about Tray's carrier status, the population carrier frequency is approximately 1/50, putting the current pregnancy at an approximately 1% chance to have PKU.   We reviewed the options for carrier testing today and the family will continue to consider it.        Finally, we discussed the risks of maternal PKU.  As Laila knows this occurs when a woman's phe levels are not well controlled during a pregnancy which is toxic to the developing baby.  This can cause birth defects and intellectual disability/developmental delay in a child.  Therefore it is extremely important that a woman's phe levels are under excellent control prior to conception and throughout pregnancy.  To address this we recommend frequent phe levels throughout the pregnancy with a goal of 2-4 mg/dL.  Laila is being followed by the [facility] Department.         A detailed discussion of specific plans and strategies for reducing phe levels was undertaken by Dr. Monroy and Gay Lopez RD.   It was a pleasure meeting with Laila and Tray today and I wish  them the best.  My contact information was shared with the family should they have additional questions.      Plan:  1.  Laila and her  are encouraged to contact me if they are interested in pursuing carrier screening  2.  Continued pregnancy management with the [facility] department   3.  Follow-up as recommended by Dr. Monroy and Gay yAers [facility]  Genetic Counselor  Division of Genetics and Metabolism     Total time spent in consultation with the family was approximately 18 minutes

## 2019-02-18 NOTE — LETTER
2/18/2019    RE: Laila Cueva  [address] [place] [address]     Presenting Information:  Laila Cueva is a 33-year-old female with phenylketonuria (PKU).  Her genotype is unknown.  Laila returned to the [facility] PKU clinic today for a follow-up appointment with Dr. Quan Monroy.  She is currently pregnant, due 9/27/19.  She was accompanied by her  Tray.  Given her current pregnancy, I met with the family at the request of Dr. Monroy to review the genetics and inheritance of PKU, the options available for genetic testing, and the risks of maternal PKU.       Discussion:  As discussed at Laila's last visit, genes are long stretches of DNA that are responsible for how our bodies look and how our bodies work. We all have two copies of every gene; one inherited from the mother and one inherited from the father. When there is a change, called a mutation, in a gene it can cause it to not do its job correctly which can cause the signs and symptoms of a genetic condition.      PKU is caused by mutations in a gene called PAH. The PAH gene is normally important for creating an enzyme that breaks down phenylalanine (Phe). Mutations in the PAH gene cause this enzyme to not do its job the way it is supposed to, resulting in phenylalanine not being broken down effectively. This is toxic to the cells and unless a low phe diet is followed, has serious health and developmental consequences.      PKU is inherited in an autosomal recessive pattern. This means that to be affected an individual must inherit a mutation in both copies of the PAH gene (one from each parent). Individuals with just one mutation in the PAH gene are said to be carriers. Carriers do not have PKU but can have an affected child if their partner is also a carrier (or affected themselves).          Laila has not previously had genetic testing for PKU.  We discussed why this can be important.  The specific mutations a  patient carries helps predict residual enzyme function which in turn can help predict disease severity.  This can help us determine how much dietary phe Laila can tolerate.  Determining a patient's genotype also helps predict whether they will respond to the medication Kuvan which can be an important tool in lowering phe levels.  Finally, this information is necessary if Laila wishes to pursue prenatal testing for PKU if her partner is found to be a carrier.  Laila felt comfortable declining genetic testing today.       Because both of Laila's PAH genes have a mutation, no matter which copy she passed down to this pregnancy we know the baby is a carrier for PKU.  Whether this pregnancy could actually have PKU depends on if Laila's  Tray is a carrier.  If he is, there is a 50% chance for the pregnancy to be affected.  If he is not a carrier, the chance for this pregnancy to be affected is extremely low.  Without any information about Tray's carrier status, the population carrier frequency is approximately 1/50, putting the current pregnancy at an approximately 1% chance to have PKU.   We reviewed the options for carrier testing today and the family will continue to consider it.        Finally, we discussed the risks of maternal PKU.  As Laila knows this occurs when a woman's phe levels are not well controlled during a pregnancy which is toxic to the developing baby.  This can cause birth defects and intellectual disability/developmental delay in a child.  Therefore it is extremely important that a woman's phe levels are under excellent control prior to conception and throughout pregnancy.  To address this we recommend frequent phe levels throughout the pregnancy with a goal of 2-4 mg/dL.  Laila is being followed by the [facility] Department.         A detailed discussion of specific plans and strategies for reducing phe levels was undertaken by Dr. Monroy and Gay  John FERRELL.   It was a pleasure meeting with Laila and Tray today and I wish them the best.  My contact information was shared with the family should they have additional questions.      Plan:  1.  Laila and her  are encouraged to contact me if they are interested in pursuing carrier screening  2.  Continued pregnancy management with the [facility] department   3.  Follow-up as recommended by Dr. Monroy and Gay Ayers [facility]  Genetic Counselor  Division of Genetics and Metabolism

## 2019-02-18 NOTE — LETTER
"2019      RE: Laila Cueva  [address] [place] [address]                 Advanced Therapies  [facility] [address]  [address] [facility] [address]  Phone: [phone]  Fax: [phone]  Date: 2019      Patient:  Laila Cueva   :   1985   MRN:     [id number]      Laila Cueva  [address] [place] [address]    Dear Dr. Billie Mathews and Laila Cueva,    CHIEF COMPLAINT:     I had the pleasure [facility] (PKU). This patient is a 33 year old and comes for evaluation and treatment.      Since the last visit, there have been no hospitalizations, emergency department visits, or other major changes in medical care.    PAST MEDICAL HISTORY:    These list of past medical problems includes:    Patient Active Problem List   Diagnosis     Acne     CARDIOVASCULAR SCREENING; LDL GOAL LESS THAN 160     Dermatofibroma of left lower leg     Phenylketonuria (PKU) (H)     Von Willebrand disease (H)     Intramural leiomyoma of uterus     Need for Tdap vaccination         ***    FAMILY HISTORY: A brief family medical history was reviewed.  MEDICATIONS:   Current Outpatient Medications   Medication Sig     Prenatal Vit-Fe Fumarate-FA (PRENATAL MULTIVITAMIN  PLUS IRON) 27-0.8 MG TABS Take 1 tablet by mouth daily     sapropterin dihydrochloride (KUVAN) 100 MG TBSO Take 13 tablets by mouth once daily with food.     No current facility-administered medications for this visit.      REVIEW OF SYSTEMS: The review of systems negative for new eye, ear, heart, lung, liver, spleen, gastrointestinal, bone, muscle, integumentary, endocrinologic, brain or psychiatric issues except as noted above.  PHYSICAL EXAMINATION:  Demographics: /75   Pulse 74   Ht 5' 2.99\" (160 cm)   Wt 147 lb (66.7 kg)   LMP 2018 (Approximate)   BMI 26.05 kg/m     General: The patient is " oriented to person, place and time at an age-appropriate manner.   HEENT: The facial features are normal and symmetric.   The gaze is conjugate and extraocular motions are full and intact.The pupils are equal, round and reactive to light.  The ears are of normal position and configuration and hearing is grossly normal.  The oropharynx is benign and the tongue protrudes normally without fasciculations.  Neck: The neck is supple with full range of motion  Chest: The chest is of normal configuration and clear by auscultation.   Heart: A normal S1 and S2 are heard without murmurs or gallops.  Abdomen: The abdomen is soft and benign without organomegaly.   Extremities: The extremities are of normal configuration without contractures nor hyperlaxities. Strength and tone appear to be normal and symmetric. Deep tendon reflexes are normal at the biceps, patellar and ankles, and there is no clonus at the ankles.   Integument: The integument is  of normal appearance without significant changes in pigmentation, birthmarks, or lesions.  Neurologic:  Mental Status Exam:  Alert, awake. Fully oriented. No dysarthria, no dysphasia. Speech of normal fluency.  Cranial Nerves:  PERRLA, EOMs intact, no nystagmus, facial movements symmetric. No atrophy or fasciculations.    Motor:  Normal tone in all four extremities, no atrophy or fasciculations. 5/5 strength bilaterally in shoulder abduction, elbow extensors and flexors, , hip flexors, knee extensors and flexors. No tremors.  Sensory:  Negative Romberg.  Reflexes:  2+ and symmetric in biceps, patellar, Achilles; There is no clonus at the ankles.  Gait:  Normal gait; normal arm swing and stance.ankles.    LABORATORY RESULTS: Previous studies showed pathologically elevated blood phenylalanine levels as well as specific PAH mutatons and excluded disorders of biopterin recycling. Laboratory studies from the past year were reviewed.    ASSESSMENT:  1.) Phenylketonuria (PKU).  2.)  "Moderately good control with blood phe levels aiming at levels of 6 mg/dL or lower. Blood phe levels at *** mg/dl since last visit.    PLAN/RECOMMENDATIONS:    1.) Send blood \"tyrosine and phenylalanine\" levels monthly.  2.) Metabolic PKU Dietician Consult today for regular diet assessment and nutritional management including the patient's prescribed phenylalanine intake.  3.) Clinical Pharmacotherapy consultation with Pharmacotherapy for Inborn Errors of Metabolism (PIMD) expert regarding sapropterin dihydrochloride medication for PKU.  4.) Continue low phenylalanine diet (250-400 mg phenylalanine/day, or as modified by consultation with the Metabolic PKU Dietician considering recent blood phenylalanine levels, diet history, and impact of sapropterin, if taken).  5..) Return to PKU Clinic in *** months.    FOLLOW-UP PLAN:  If you are returning to clinic to review specific laboratory tests, please call the Genetic Counselor (see phone numbers below) to confirm that we have received all of the results from reference laboratories prior to your appointment. If we have not received all of the test results, please discuss re-scheduling your appointment.    I spent *** minutes face-to-face with the patient reviewing the chief complaint, past medical history, and obtaining a review of systems as well as doing a physical examination; more than 50% of this time was spent in counseling and education regarding Maternal PKU, the availability of the local patient support group with information on the [place] PKU Foundation available at www.mnpku.org as well as ***.    With warmest regards,     Quan Monroy PhD, MD  Professor of Pediatrics, and  [place] of Human Genetics    Appointments: [phone]      Monday mornings: Advanced Therapies for Lysosomal Diseases Clinic   Monday afternoons: PKU Clinic, Metabolism Clinic, and Genetics Clinic    Pharmacotherapy Consultant:  Rosemarie Acosta, PharmD, Pharmacotherapy for " Metabolic Disorders (PIMD): [phone]  Eduard Pacheco, PharmD, Pharmacotherapy for Metabolic Disorders (PIMD): [phone]    Genetic Counselor:  Patricia Ayers MS, [facility] (Genetic test Results): [phone]  Annemarie Alfonso MS, GCG, (Genetic test results: [phone])    Metabolic Dietician:  Gay Lopez, Registered Dietician: [phone]  Olamide Keen, Registered Dietician: [phone]    :  SHANNON Sanchez, [facility], Clinical , [phone]    Advanced Therapies Clinic Scheduler:  Maxine Durham, [phone]    Nurse Coordinator, Metabolism and Genetics (afternoon clinics):  ANTIONE ChouN, RN, PHN  [phone]    Copy to Referring Physician and Primary Care Practitioner:  Dr. Billie Mathews, APRN CNP  [address] [place] [address]      Billie Lorenzana  [address] [facility] [address]    Copy  to patient:  Laila Cueva  [address] [place] [address]      Bishop Monroy MD

## 2019-02-18 NOTE — NURSING NOTE
"[facility] [[id number]]  Chief Complaint   Patient presents with     RECHECK     PKU     Initial /75   Pulse 74   Ht 5' 2.99\" (160 cm)   Wt 147 lb (66.7 kg)   LMP 12/21/2018 (Approximate)   BMI 26.05 kg/m   Estimated body mass index is 26.05 kg/m  as calculated from the following:    Height as of this encounter: 5' 2.99\" (160 cm).    Weight as of this encounter: 147 lb (66.7 kg).  Medication Reconciliation: complete   Becky Christina LPN      "

## 2019-02-18 NOTE — LETTER
2019       RE: Laila Cueva  [address] [place] [address]     Dear Colleague,    Thank you for referring your patient, Laila Cueva, to the PEDS PKU at [facility]. Please see a copy of my visit note below.              Advanced Therapies  [facility] [address]  [facility] [address]  Phone: [phone]  Fax: [phone]  Date: 2019      Patient:  Laila Cueva   :   1985   MRN:     [id number]      Laila Cueva  [address] [place] [address]    Dear Dr. Billie Mathews and Laila Cueva,    CHIEF COMPLAINT:     I had the pleasure of seeing Laila Cueva in the PKU and Maternal PKU Clinic at the [facility] regarding phenylketonuria (PKU). This patient is a 33 year old and comes for evaluation and treatment.      Since the last visit, there have been no hospitalizations, emergency department visits, or other major changes in medical care.    PAST MEDICAL HISTORY:    These list of past medical problems includes:    Patient Active Problem List   Diagnosis     Acne     CARDIOVASCULAR SCREENING; LDL GOAL LESS THAN 160     Dermatofibroma of left lower leg     Phenylketonuria (PKU) (H)     Von Willebrand disease (H)     Intramural leiomyoma of uterus     Need for Tdap vaccination         ***    FAMILY HISTORY: A brief family medical history was reviewed.  MEDICATIONS:   Current Outpatient Medications   Medication Sig     Prenatal Vit-Fe Fumarate-FA (PRENATAL MULTIVITAMIN  PLUS IRON) 27-0.8 MG TABS Take 1 tablet by mouth daily     sapropterin dihydrochloride (KUVAN) 100 MG TBSO Take 13 tablets by mouth once daily with food.     No current facility-administered medications for this visit.      REVIEW OF SYSTEMS: The review of systems negative for new eye, ear, heart, lung, liver, spleen, gastrointestinal, bone, muscle, integumentary, endocrinologic, brain or psychiatric issues except as noted  "above.  PHYSICAL EXAMINATION:  Demographics: /75   Pulse 74   Ht 5' 2.99\" (160 cm)   Wt 147 lb (66.7 kg)   LMP 12/21/2018 (Approximate)   BMI 26.05 kg/m     General: The patient is oriented to person, place and time at an age-appropriate manner.   HEENT: The facial features are normal and symmetric.   The gaze is conjugate and extraocular motions are full and intact.The pupils are equal, round and reactive to light.  The ears are of normal position and configuration and hearing is grossly normal.  The oropharynx is benign and the tongue protrudes normally without fasciculations.  Neck: The neck is supple with full range of motion  Chest: The chest is of normal configuration and clear by auscultation.   Heart: A normal S1 and S2 are heard without murmurs or gallops.  Abdomen: The abdomen is soft and benign without organomegaly.   Extremities: The extremities are of normal configuration without contractures nor hyperlaxities. Strength and tone appear to be normal and symmetric. Deep tendon reflexes are normal at the biceps, patellar and ankles, and there is no clonus at the ankles.   Integument: The integument is  of normal appearance without significant changes in pigmentation, birthmarks, or lesions.  Neurologic:  Mental Status Exam:  Alert, awake. Fully oriented. No dysarthria, no dysphasia. Speech of normal fluency.  Cranial Nerves:  PERRLA, EOMs intact, no nystagmus, facial movements symmetric. No atrophy or fasciculations.    Motor:  Normal tone in all four extremities, no atrophy or fasciculations. 5/5 strength bilaterally in shoulder abduction, elbow extensors and flexors, , hip flexors, knee extensors and flexors. No tremors.  Sensory:  Negative Romberg.  Reflexes:  2+ and symmetric in biceps, patellar, Achilles; There is no clonus at the ankles.  Gait:  Normal gait; normal arm swing and stance.ankles.    LABORATORY RESULTS: Previous studies showed pathologically elevated blood phenylalanine " "levels as well as specific PAH mutatons and excluded disorders of biopterin recycling. Laboratory studies from the past year were reviewed.    ASSESSMENT:  1.) Phenylketonuria (PKU).  2.) Moderately good control with blood phe levels aiming at levels of 6 mg/dL or lower. Blood phe levels at *** mg/dl since last visit.    PLAN/RECOMMENDATIONS:    1.) Send blood \"tyrosine and phenylalanine\" levels monthly.  2.) Metabolic PKU Dietician Consult today for regular diet assessment and nutritional management including the patient's prescribed phenylalanine intake.  3.) Clinical Pharmacotherapy consultation with Pharmacotherapy for Inborn Errors of Metabolism (PIMD) expert regarding sapropterin dihydrochloride medication for PKU.  4.) Continue low phenylalanine diet (250-400 mg phenylalanine/day, or as modified by consultation with the Metabolic PKU Dietician considering recent blood phenylalanine levels, diet history, and impact of sapropterin, if taken).  5..) Return to PKU Clinic in *** months.    FOLLOW-UP PLAN:  If you are returning to clinic to review specific laboratory tests, please call the Genetic Counselor (see phone numbers below) to confirm that we have received all of the results from reference laboratories prior to your appointment. If we have not received all of the test results, please discuss re-scheduling your appointment.    I spent *** minutes face-to-face with the patient reviewing the chief complaint, past medical history, and obtaining a review of systems as well as doing a physical examination; more than 50% of this time was spent in counseling and education regarding Maternal PKU, the availability of the local patient support group with information on the [place] PKU Foundation available at www.mnpku.org as well as ***.    With warmest regards,     Quan Monroy PhD MD  Professor of Pediatrics, and  [employer]    Appointments: [phone]      Monday mornings: Advanced " Therapies for Lysosomal Diseases Clinic   Monday afternoons: PKU Clinic, Metabolism Clinic, and Genetics Clinic    Pharmacotherapy Consultant:  Rosemarie Acosta, PharmD, Pharmacotherapy for Metabolic Disorders (PIMD): [phone]  Eduard Pacheco, PharmD, Pharmacotherapy for Metabolic Disorders (PIMD): [phone]    Genetic Counselor:  Patricia Ayers MS, [facility] (Genetic test Results): [phone]  Annemarie Alfonso MS, GCG, (Genetic test results: [phone])    Metabolic Dietician:  Gay Lopez, Registered Dietician: [phone]  Olamide Keen, Registered Dietician: [phone]    :  SHANNON Sanchez, [facility], Clinical , [phone]    Advanced Therapies Clinic Scheduler:  Maxine Durham, [phone]    Nurse Coordinator, Metabolism and Genetics (afternoon clinics):  ANTIONE hCouN, RN, PHN  [phone]    Copy to Referring Physician and Primary Care Practitioner:  Dr. Billie Mathews, APRN CNP  [address] [facility] [place] [place] [address]      Billie Lorenzana  [address] [facility] [address]    Copy  to patient:  Laila Cueva  [address] [place] [address]      Again, thank you for allowing me to participate in the care of your patient.      Sincerely,    Bishop Monroy MD

## 2019-02-18 NOTE — LETTER
"  2019      RE: Laila Cueva  [address] [place] [address]                 Advanced Therapies  [facility] [address]  [address] [facility] [address]  Phone: [phone]  Fax: [phone]  Date: 2019      Patient:  Laila Cueva   :   1985   MRN:     [id number]      Laila Cueva  [address] [place] [address]    Dear Dr. Billie Mathews and Laila Cueva,    CHIEF COMPLAINT:     I had the pleasure of seeing Laila Cueva in the PKU and Maternal PKU Clinic at the [facility] regarding phenylketonuria (PKU). This patient is a 33 year old and comes for evaluation and treatment.      Since the last visit, there have been no hospitalizations, emergency department visits, or other major changes in medical care.    PAST MEDICAL HISTORY:    These list of past medical problems includes:    Patient Active Problem List   Diagnosis     Acne     CARDIOVASCULAR SCREENING; LDL GOAL LESS THAN 160     Dermatofibroma of left lower leg     Phenylketonuria (PKU) (H)     Von Willebrand disease (H)     Intramural leiomyoma of uterus     Need for Tdap vaccination         ***    FAMILY HISTORY: A brief family medical history was reviewed.  MEDICATIONS:   Current Outpatient Medications   Medication Sig     Prenatal Vit-Fe Fumarate-FA (PRENATAL MULTIVITAMIN  PLUS IRON) 27-0.8 MG TABS Take 1 tablet by mouth daily     sapropterin dihydrochloride (KUVAN) 100 MG TBSO Take 13 tablets by mouth once daily with food.     No current facility-administered medications for this visit.      REVIEW OF SYSTEMS: The review of systems negative for new eye, ear, heart, lung, liver, spleen, gastrointestinal, bone, muscle, integumentary, endocrinologic, brain or psychiatric issues except as noted above.  PHYSICAL EXAMINATION:  Demographics: /75   Pulse 74   Ht 5' 2.99\" (160 cm)   Wt 147 lb (66.7 kg)   LMP 2018 (Approximate)   BMI 26.05 kg/m     General: The patient " is oriented to person, place and time at an age-appropriate manner.   HEENT: The facial features are normal and symmetric.   The gaze is conjugate and extraocular motions are full and intact.The pupils are equal, round and reactive to light.  The ears are of normal position and configuration and hearing is grossly normal.  The oropharynx is benign and the tongue protrudes normally without fasciculations.  Neck: The neck is supple with full range of motion  Chest: The chest is of normal configuration and clear by auscultation.   Heart: A normal S1 and S2 are heard without murmurs or gallops.  Abdomen: The abdomen is soft and benign without organomegaly.   Extremities: The extremities are of normal configuration without contractures nor hyperlaxities. Strength and tone appear to be normal and symmetric. Deep tendon reflexes are normal at the biceps, patellar and ankles, and there is no clonus at the ankles.   Integument: The integument is  of normal appearance without significant changes in pigmentation, birthmarks, or lesions.  Neurologic:  Mental Status Exam:  Alert, awake. Fully oriented. No dysarthria, no dysphasia. Speech of normal fluency.  Cranial Nerves:  PERRLA, EOMs intact, no nystagmus, facial movements symmetric. No atrophy or fasciculations.    Motor:  Normal tone in all four extremities, no atrophy or fasciculations. 5/5 strength bilaterally in shoulder abduction, elbow extensors and flexors, , hip flexors, knee extensors and flexors. No tremors.  Sensory:  Negative Romberg.  Reflexes:  2+ and symmetric in biceps, patellar, Achilles; There is no clonus at the ankles.  Gait:  Normal gait; normal arm swing and stance.ankles.    LABORATORY RESULTS: Previous studies showed pathologically elevated blood phenylalanine levels as well as specific PAH mutatons and excluded disorders of biopterin recycling. Laboratory studies from the past year were reviewed.    ASSESSMENT:  1.) Phenylketonuria (PKU).  2.)  "Moderately good control with blood phe levels aiming at levels of 6 mg/dL or lower. Blood phe levels at *** mg/dl since last visit.    PLAN/RECOMMENDATIONS:    1.) Send blood \"tyrosine and phenylalanine\" levels monthly.  2.) Metabolic PKU Dietician Consult today for regular diet assessment and nutritional management including the patient's prescribed phenylalanine intake.  3.) Clinical Pharmacotherapy consultation with Pharmacotherapy for Inborn Errors of Metabolism (PIMD) expert regarding sapropterin dihydrochloride medication for PKU.  4.) Continue low phenylalanine diet (250-400 mg phenylalanine/day, or as modified by consultation with the Metabolic PKU Dietician considering recent blood phenylalanine levels, diet history, and impact of sapropterin, if taken).  5..) Return to PKU Clinic in *** months.    FOLLOW-UP PLAN:  If you are returning to clinic to review specific laboratory tests, please call the Genetic Counselor (see phone numbers below) to confirm that we have received all of the results from reference laboratories prior to your appointment. If we have not received all of the test results, please discuss re-scheduling your appointment.    I spent *** minutes face-to-face with the patient reviewing the chief complaint, past medical history, and obtaining a review of systems as well as doing a physical examination; more than 50% of this time was spent in counseling and education regarding Maternal PKU, the availability of the local patient support group with information on the [place] PKU Foundation available at www.mnpku.org as well as ***.    With warmest regards,     Quan Monryo PhD, MD  Professor of Pediatrics, and  [employer]    Appointments: [phone]      Monday mornings: [facility] for Lysosomal Diseases Clinic   Monday afternoons: PKU Clinic, Metabolism Clinic, and Genetics Clinic    Pharmacotherapy Consultant:  Rosemarie Acosta, PharmD, Pharmacotherapy for " Metabolic Disorders (PIMD): [phone]  Eduard Pacheco, PharmD, Pharmacotherapy for Metabolic Disorders (PIMD): [phone]    Genetic Counselor:  Patricia Ayers MS, [facility] (Genetic test Results): [phone]  Annemarie Alfonso MS, GCG, (Genetic test results: [phone])    Metabolic Dietician:  Gay Lopez, Registered Dietician: [phone]  Olamide Keen, Registered Dietician: [phone]    :  SHANNON Sanchez, [facility], Clinical , [phone]    Advanced Therapies Clinic Scheduler:  Maxine Durham, [phone]    Nurse Coordinator, Metabolism and Genetics (afternoon clinics):  ANTIONE ChouN, RN, PHN  [phone]    Copy to Referring Physician and Primary Care Practitioner:  Dr. Billie Mathews, APRN CNP  [address] [place] [address]      Billie Lorenzana  [address] [facility] [address]    Copy  to patient:  Laila Cueva  [address] [place] [address]

## 2019-02-19 NOTE — PROGRESS NOTES
CLINICAL NUTRITION SERVICES - PEDIATRIC ASSESSMENT NOTE     REASON FOR ASSESSMENT  Laila Cueva is a 33 year old female seen by the dietitian for consult regarding mild PKU/pregnancy.     ANTHROPOMETRICS  Height: 160.5 cm  Weight: 66.7 kg  BMI: 26.1    Comments: weight stable thus far; having some nausea but only vomited twice during pregnancy so far.     NUTRITION HISTORY  Patient follows a low protein diet of 30-45 grams/day.     Patient is about 8 weeks along in her 2nd pregnancy. She states a typical/yesterday's intake as:    Breakfast: cereal or avocado toast, tea, hot cocoa  Lunch: 15 gram lunch (unspecified)  Dinner: 20 gram - last night was 2 pieces of pizza with salad     She has been keeping track of her protein and staying within the range since finding out she was pregnancy.  She feels this has been going well and she has been working on taking her formula more often/regularily.    PKU FORMULA  Phenylade Essentials (strawberry) - 1-2 packets/day    This would supply 314 kcals/day (5 kcal/kg), 20 grams protein (0.3 g/kg; total protein 0.9 g/kg, 480 international units Vitamin D, 658 mg calcium, and 8.8 mg iron.       LABS  Labs reviewed;         PHE TYR  Feb 18  2.3 0.8                MEDICATIONS  Medications reviewed; includes prenatal MVI, Kuvan                ASSESSED NUTRITION NEEDS:  Estimated Energy Needs: 25-30 kcal/kg  Estimated Protein Needs: RDA for age = 0.8 g/kg, 2nd & 3rd trimesters 1.1 g/kg, optimal range 0.8-1.2 g/kg  Micronutrient Needs:               Pregnancy:              Folate: 600 mcg              B12: 2.6 mcg         Iron: 27 mg              Zinc: 11 mg       DHA: 200-300 mg/day              Vitamin A: not to exceed 10,000 IU      NUTRITION DIAGNOSIS:  Impaired nutrient utilization related to diagnosis of mild PKU as evidenced by elevated PHE levels.     Increased nutrient needs related to pregnancy     INTERVENTIONS  Nutrition Prescription  Meet 100% estimated kcal, protein  needs through moderate-protein restricted diet    Nutrition Education:   Provided education on goals for nutrition for PKU/pregnancy.     1. Reviewed PKU guidelines of weekly levels maintained ideally between 2-4 mg/dL. Reviewed need for increased blood PHE monitoring and attention to diet to monitor for elevated and/or too low of PHE levels adequately  2. Patient does have test kits at home and will begin to do weekly tests; may be able to decrease to every other week if stable  3. Reviewed role of PKU formula and how this can be used to benefit diet and get adequate nutrition throughout pregnancy using as a meal replacement or additional protein to promote adequacy to increased needs.  4. Patient and  verbalized understanding and had no further questions.     Goals  1. Adequate weight gain throughout pregnancy (2-4 lbs in first trimester; average of 1 lb/week after; total weight gain 15-25 lbs)  2. Meet >85% estimated nutrition needs through low/moderate protein diet  3. PHE levels 2-4 mg/dL    FOLLOW UP/MONITORING  Energy Intake  Macronutrient intake  Anthropometric measurements     Time spent with patient: 15 minutes

## 2019-02-28 NOTE — PROGRESS NOTES
This is a recent snapshot of the patient's [facility] medical record.  For current drug dose and complete information and questions, call [phone] or In Basket pool, [facility] ([id number])  CSN Number:  [id number]

## 2019-03-07 ENCOUNTER — PRENATAL OFFICE VISIT (OUTPATIENT)
Dept: OBGYN | Facility: CLINIC | Age: 34
End: 2019-03-07
Payer: COMMERCIAL

## 2019-03-07 VITALS
WEIGHT: 146.9 LBS | BODY MASS INDEX: 26.03 KG/M2 | HEART RATE: 90 BPM | DIASTOLIC BLOOD PRESSURE: 72 MMHG | SYSTOLIC BLOOD PRESSURE: 112 MMHG

## 2019-03-07 DIAGNOSIS — O99.281: Primary | ICD-10-CM

## 2019-03-07 DIAGNOSIS — O34.219 PREVIOUS CESAREAN DELIVERY, ANTEPARTUM CONDITION OR COMPLICATION: ICD-10-CM

## 2019-03-07 DIAGNOSIS — E70.1: Primary | ICD-10-CM

## 2019-03-07 PROCEDURE — 99207 ZZC FIRST OB VISIT: CPT | Performed by: OBSTETRICS & GYNECOLOGY

## 2019-03-07 NOTE — NURSING NOTE
"Chief Complaint   Patient presents with     Prenatal Care     10+6       Initial /72   Pulse 90   Wt 66.6 kg (146 lb 14.4 oz)   LMP 2018 (Approximate)   BMI 26.03 kg/m   Estimated body mass index is 26.03 kg/m  as calculated from the following:    Height as of 19: 1.6 m (5' 2.99\").    Weight as of this encounter: 66.6 kg (146 lb 14.4 oz).  BP completed using cuff size: regular    Questioned patient about current smoking habits.  Pt. has never smoked.          The following HM Due: NONE      The following patient reported/Care Every where data was sent to:  P ABSTRACT QUALITY INITIATIVES [83440]        patient has appointment for today  Rebecca Moulton                "

## 2019-03-07 NOTE — PROGRESS NOTES
CC: New Ob visit  HPI: Laila Cueva is a 33 year old  here for new Ob visit.  Patient's last menstrual period was 2018 (approximate)..  She is 10w6d by known LMP, giving her an EDC of 19.  The pregnancy was planned and she and her  are feeling excited.    This far the pregnancy has been uneventful other than mild nausea due to smell aversions.  Her previous pregnancy was uncomplicated.  She has PKU and takes Kuven.  She gets weekly lab draws to keep levels <6.  She had a LTCS for failed IOL (didn't progress past 2-3cm) and desires a TOLAC.    Past Medical History:   Diagnosis Date     Atypical PKU (H)     since birth, manage with diet      Raynaud phenomenon     affects fingers and toes     Von Willebrand disease (H)     affects mainly menses     Von Willebrand disease (H)        Past Surgical History:   Procedure Laterality Date     BIOPSY OF SKIN LESION      2 benign nevus removed      SECTION N/A 2017    Procedure:  SECTION;  Surgeon: Ana Maria Israel MD;  Location: [facility] L+D     HC TOOTH EXTRACTION W/FORCEP       Obstetric History       T1      L1     SAB0   TAB0   Ectopic0   Multiple0   Live Births1       # Outcome Date GA Lbr Tarik/2nd Weight Sex Delivery Anes PTL Lv   2 Current            1 Term 17 39w5d  3.969 kg (8 lb 12 oz) M CS-LTranv EPI  JUMA      Name: REJI CUEVA      Apgar1:  8                Apgar5: 9              Current Outpatient Medications:      Prenatal Vit-Fe Fumarate-FA (PRENATAL MULTIVITAMIN  PLUS IRON) 27-0.8 MG TABS, Take 1 tablet by mouth daily, Disp: , Rfl:      sapropterin dihydrochloride (KUVAN) 100 MG TBSO, Take 13 tablets by mouth once daily with food., Disp: 360 tablet, Rfl: 11    Allergies   Allergen Reactions     Aspartame      Nuts Other (See Comments)     Peanut (Diagnostic) Other (See Comments)       Family History   Problem Relation Age of Onset     Anemia Mother      Myocardial Infarction  Father 64        alive     Hypertension Father      Melanoma Maternal Grandmother          from another cancer type     Anemia Maternal Grandmother        Past medical, social, surgical and family history were reviewed and updated in EPIC.    ROS: No TIA's or unusual headaches, no dysphagia.  No prolonged cough. No dyspnea or chest pain on exertion.  No abdominal pain, change in bowel habits, black or bloody stools.  No urinary tract symptoms.  No new or unusual musculoskeletal symptoms.  Normal menses, no abnormal vaginal bleeding, discharge or unexpected pelvic pain. No new breast lumps, breast pain or nipple discharge.    PE: /72   Pulse 90   Wt 66.6 kg (146 lb 14.4 oz)   LMP 2018 (Approximate)   BMI 26.03 kg/m      Gen: Healthy appearing female in no acute distress  Heart: RRR  Lungs: CTAB  Abd: +BS, SNT  Ex: No C/C/E, no suspicious rashes or lesions    Pelvic: Normal vulva and vagina with scant white d/c.  Cervix without lesions, no CMT.  Uterus approximately 10 week size, mobile, NT.  Adnexa NT, no masses.    BSUS: single live IUP, +FCA, +FM    A/P:  1) IUP at 10w6d, maternal PKU.  H/o LTCS        PNL wnl, pap UTD        Reviewed anticipated course of prenatal care        Reviewed recommendations for weight gain, activity and diet        Discussed maternal PKU and plan for MFM consult.  We discussed that likely mgmt will be same as last pregnancy and as long as levels remain stable, no changes.          We discussed options for genetic screening and diagnosis including CVS/amnio, first trimester screen and quad screen.  We discussed a fetal survey will be performed around 18-20 weeks.  They are thinking maybe the FTS, so will order along with MFM consult.       Discussed MD call schedule as well as role of residents and med students both in clinic and hospital.  She is ok with resident care        Risks and benefits of trial of labor after  section versus elective repeat   section were explained. The discussion included risk 1% of uterine rupture with a 0.5% chance of subsequent risk of fetal death or brain damage or significant maternal             morbidity or death. I also explained risks of bleeding, infection, and potential damage to other organs associated with a repeat . If a trial of labor is chosen, we discussed the need for continuous monitoring and IV access in active labor.  We also discussed     the contraindications to induction and the need  to have repeat  if one were to become post dates/41 weeks without labor.  All questions were answered.  She would like to TOLAC.       RTC 4 weeks    Erika De La Paz MD

## 2019-03-08 DIAGNOSIS — E70.1 PHENYLKETONURIA (PKU) (H): Primary | ICD-10-CM

## 2019-03-28 ENCOUNTER — PRE VISIT (OUTPATIENT)
Dept: MATERNAL FETAL MEDICINE | Facility: CLINIC | Age: 34
End: 2019-03-28

## 2019-04-02 ENCOUNTER — HOSPITAL ENCOUNTER (OUTPATIENT)
Dept: ULTRASOUND IMAGING | Facility: CLINIC | Age: 34
Discharge: HOME | End: 2019-04-02
Attending: OBSTETRICS & GYNECOLOGY | Admitting: OBSTETRICS & GYNECOLOGY
Payer: COMMERCIAL

## 2019-04-02 ENCOUNTER — OFFICE VISIT (OUTPATIENT)
Dept: MATERNAL FETAL MEDICINE | Facility: CLINIC | Age: 34
End: 2019-04-02
Attending: OBSTETRICS & GYNECOLOGY
Payer: COMMERCIAL

## 2019-04-02 DIAGNOSIS — E70.1 PHENYLKETONURIA (PKU) (H): Primary | ICD-10-CM

## 2019-04-02 DIAGNOSIS — E70.1 PHENYLKETONURIA (PKU) (H): ICD-10-CM

## 2019-04-02 PROCEDURE — G0463 HOSPITAL OUTPT CLINIC VISIT: HCPCS

## 2019-04-02 PROCEDURE — 76805 OB US >/= 14 WKS SNGL FETUS: CPT

## 2019-04-02 NOTE — PROGRESS NOTES
RE: Laila Cueva  : 1985  MRUN: [id number]    2019    Dear Dr. De La Paz,    Thank you for referring your patient Ms. Cueva for a Maternal-Fetal Medicine consultation today.  As you know, she is a 33 year old  at 14 weeks and 4 days gestation with an Estimated Date of Delivery: Sep 27, 2019 by  LMP     She came to me today to discuss recommendations as she has a history of phenylketonuria.  She has had a previous pregnancy during which she had a consultation with Dr. Licona.  She has had excellent control with diet and her levels are within the recommended range.  She follows with the PKU clinic here at [facility] and gets levels checked every 3-4 weeks.      Her first pregnancy resulted in a  39 week  delivery due to non-reassuring fetal heart rate tracing.    Today she feels well.  She denies contractions, leakage of fluid and vaginal bleeding.       Problems Complicating Pregnancy:  Maternal PKU    Obstetrical History:    G1:  2017; 3968 g infant;  delivery due to non-reassuring fetal heart rate tracing  G2:  current    Gynecological History:  Regular menses  She denies a history of sexually transmitted infections, dysmenorrhea, ovarian cysts, myomas, endometriosis, or abnormal Pap tests.    Medical History:   PKU    Surgical History:    delivery 2017    Medications:    Prenatal vitamin  Kuvan    Allergies:   NKDA    Social History:  She  reports that  has never smoked. she has never used smokeless tobacco. She reports that she does not drink alcohol or use drugs.  Employment: works at a beer distributor  Lives at home with her  and son, feels safe at home    Family History:   Ethnicity:   She denies a family history of motor/intellectual impairment, stillbirth, genetic or chromosome abnormalities or congenital anomalies.       Partner History:  Ethnicity:   He denies a family history of motor/intellectual impairment, stillbirth, genetic or  chromosome abnormalities or congenital anomalies.     He has no children with other partners.      Review of Systems:  Ten point review of systems negative per HPI    Data Reviewed:    ABO Group: A, Rh type: pos, antibody screen: neg  Hemoglobin 12.9 mg/dL, hematocrit 38.5 %, MCV 88 fL, platelets 165 thou/ L  VDRL/HepBsAg/HIV: neg/neg/neg  Rubella IgG: immune  Urine culture: no growth  Pap smear: NIL (2/17)  Phenylalanine:  2.3 mg/dL  Tyrosine: 0.8 mg/dL    The remaining prenatal laboratory results are not available for the review during the consultation.    Ultrasound:  See Imaging tab under Chart Review for full report    Physical examination was deferred at this time.    In light of the patient s history as listed above my recommendations can be summarized briefly as follows:    No problem-specific Assessment & Plan notes found for this encounter.    1.  Maternal Phenylketonuria (PKU)    Phenylketonuria (PKU) is a disorder which usually results from a deficiency of the hepatic enzyme phenylalanine hydroxylase (PAH) leading to elevated levels of the amino acid, phenylalanine and its metabolites. If untreated, it is characterized by intellectual disability.  PAH catalyzes the conversion of phenylalanine to tyrosine. Tyrosine concentrations are usually normal or low normal. Complete enzyme deficiency results in classic PKU, in which serum phenylalanine concentration exceeds 20 mg/dL. Residual enzyme activity causes moderate and mild PKU as well as mild and benign mild HPA.    The mainstay of therapy in PKU is dietary restriction of phenylalanine, which requires the use of medical foods including phenylalanine-free protein substitutes (amino acid mixtures). The primary pharmacotherapy is tetrahydrobiopterin/sapropterin, a cofactor for PAH.     The blood phenylalanine concentration associated with optimal neurodevelopmental outcome is uncertain.  Although data are limited, higher blood phenylalanine concentrations  appear to adversely affect brain function, even in adults. Elevated phenylalanine concentration may be associated with illness, trauma, high phenylalanine intake, or inadequate intake of amino acid mixture, total energy, and/or protein in relation to metabolic needs.    Breastfeeding infants with PKU is encouraged under the supervision of an experienced metabolic dietitian and is alternated with phenylalanine-free formula feeding. Breast milk is usually limited to approximately 25 percent of feedings, depending upon disease severity. Phenylalanine intake through breast milk must take into account the daily phenylalanine allowance. Breast milk is lower in phenylalanine than standard infant formula. Interestingly, restriction of the maternal diet has no impact on the amino acid composition of breast milk.    Elevated serum phenylalanine concentration during early pregnancy in a mother with PKU can result in phenylalanine embryopathy. The risk results from the mother's metabolic status and is independent of whether the fetus has PKU. Women with PKU should be encouraged to receive family planning and preconception services. The concentration of phenylalanine is higher in fetal than maternal plasma. Because of the concentration gradient, even if maternal phenylalanine levels are in a reasonable range, levels in the fetal compartment may be teratogenic. The embryopathic effects of maternal PKU include congenital heart disease (CHD), microcephaly, fetal growth restriction and intellectual disability. In untreated pregnancies in which the maternal blood phenylalanine concentration was ?20 mg/dL, microcephaly and intellectual disability occurred in 73-92 percent, and 12 percent had congenital heart disease.    Outcome is improved substantially when treatment results in low maternal phenylalanine concentrations ideally before conception or less optimally before 10 weeks gestation. This was illustrated by the Maternal PKU  Study, an international collaboration that prospectively evaluated 572 pregnancies in women with PKU and 99 controls. Among the affected mothers with live born infants, metabolic control was achieved before conception or by 10 weeks in 16 and 18 percent, respectively. In these groups, the rates of microcephaly were substantially lower at 3.6 and 5 percent, respectively.    The risk of CHD in offspring depends upon maternal blood phenylalanine concentrations at baseline and during 4 to 10 weeks gestation. In another report from the Maternal PKU Study, CHD occurred in 14 percent of pregnancies with a basal phenylalanine level ?15 mg/dL and a persistent level ?10 mg/dL by the eighth week, compared with 1 percent of controls. Lesions included coarctation of the aorta and hypoplastic left heart syndrome.    Another report from the Maternal PKU Study examined the effect of microcephaly and CHD in the offspring on developmental outcome through six years of age. The overall rates of CHD and microcephaly were 7.7 and 33 percent, respectively. Compared with children without either anomaly, IQ was lower with either microcephaly or CHD, and even lower with both. Decreasing IQ was associated with increasing phenylalanine exposure.    Phenylalanine embryopathy can be prevented by dietary restriction of phenylalanine intake before and during pregnancy. The NIH Consensus recommended that plasma phenylalanine levels should be reduced to levels <6 mg/dL at least three months prior to conception and remain at 2 to 6 mg/dL during pregnancy. Mothers who conceive while blood levels are greater than recommended should achieve metabolic control as soon as possible. Plasma phenylalanine concentration should be monitored twice weekly, or a minimum of once weekly. The amount of phenylalanine tolerated increases during pregnancy. The diet should be adjusted according to plasma levels.  Maternal plasma tyrosine levels should be maintained  between 0.9 and 1.8 mg/dL. Tyrosine supplementation may be needed to maintain this range.    We reviewed with the couple the goals for phenylalanine levels of 2-4mg/dL which the patient has been very close to in the pregnancy.  Encouraged her to continue to be conscious of her diet and to take her supplements as prescribed.    Recommendations:  1) Maternal phenylalanine levels to be sent to [facility] PKU lab every 1-2 weeks through the pregnancy (from preconception if possible, through delivery).  Given this patient's mild phenotype and good control, less frequent testing is probably fine, i.e. every 2-4 weeks.      2) Nutrition involvement.  3) Continue formula  4) Consider nuchal translucency assessment at 11-14 weeks' gestation as there is a correlation between fetuses with thick nuchal translucencies and congenital cardiac malformations.    5) Level 2 ultrasound for fetal anatomy assessment at 18-20 weeks  6) Fetal echocardiogram at 20-22 weeks.   7) Serial ultrasounds for fetal growth every 3 weeks with close attention to the fetal head circumference.    8) Delivery at 39 weeks if the pregnancy progresses normally to that point  9) Consider earlier delivery (around 37 weeks) if fetal size or head circumference is abnormal or falling off the curve  10) [place] screen for PKU on baby after birth as per routine (collect at 24-48 hours after birth)  11) Baby to follow in PKU clinic at [facility] even if unaffected due to risk of neurodevelopmental delays related to in-utero exposure to high phenylalanine levels     At the end of our discussion, Ms. Cueva indicated that her questions were answered and she seemed satisfied with our discussion.  Thank you for the opportunity to participate in your patient s care.  If I can be of any further assistance, please do not hesitate to contact me.    Pam Calvin MD  Maternal Fetal Medicine  I spent a total of 15 minutes face to face with this patient, >50% of which was spent in  counseling and coordination of care.

## 2019-04-02 NOTE — PROGRESS NOTES
Pt presents to [facility] for assessment and evaluation of her pregnancy due to maternal PKU. Pt had us done and reviewed by Dr. Calvin. See epic for today's details. Pt had consult with Marixa. See note in epic for discussion and recommendations. Questions answered. Pt to return at 18 weeks for L2. Discharged stable at this time. Valarie Garvin RN

## 2019-04-09 ENCOUNTER — PRENATAL OFFICE VISIT (OUTPATIENT)
Dept: OBGYN | Facility: CLINIC | Age: 34
End: 2019-04-09
Payer: COMMERCIAL

## 2019-04-09 VITALS
SYSTOLIC BLOOD PRESSURE: 119 MMHG | HEART RATE: 84 BPM | HEIGHT: 63 IN | OXYGEN SATURATION: 100 % | WEIGHT: 149 LBS | DIASTOLIC BLOOD PRESSURE: 69 MMHG | BODY MASS INDEX: 26.4 KG/M2

## 2019-04-09 DIAGNOSIS — E70.1 MATERNAL PHENYLKETONURIA IN SECOND TRIMESTER (H): Primary | ICD-10-CM

## 2019-04-09 DIAGNOSIS — O34.219 PREVIOUS CESAREAN DELIVERY, ANTEPARTUM CONDITION OR COMPLICATION: ICD-10-CM

## 2019-04-09 DIAGNOSIS — O99.282 MATERNAL PHENYLKETONURIA IN SECOND TRIMESTER (H): Primary | ICD-10-CM

## 2019-04-09 PROCEDURE — 99207 ZZC PRENATAL VISIT: CPT | Performed by: OBSTETRICS & GYNECOLOGY

## 2019-04-09 ASSESSMENT — MIFFLIN-ST. JEOR: SCORE: 1359.99

## 2019-04-09 NOTE — PROGRESS NOTES
15w4d feeling better, although still a fair amount of food aversions.  Had MFM consult, they recommend weekly levels as planned, serial growth and BPPs at 32wks.  Delivery at 39wk.  Might be starting to feel flutters.  Level II scheduled.  RTC 4 weeks  jlp

## 2019-04-29 DIAGNOSIS — E70.1 PHENYLKETONURIA (PKU) (H): ICD-10-CM

## 2019-04-29 PROCEDURE — 84510 ASSAY OF TYROSINE: CPT | Performed by: PEDIATRICS

## 2019-05-07 LAB
PHE SERPL-MCNC: 2.1 MG/DL (ref 0.5–1.6)
TYROSINE SERPL-MCNC: 0.8 MG/DL (ref 0.6–2.4)

## 2019-05-08 ENCOUNTER — OFFICE VISIT (OUTPATIENT)
Dept: MATERNAL FETAL MEDICINE | Facility: CLINIC | Age: 34
End: 2019-05-08
Attending: OBSTETRICS & GYNECOLOGY
Payer: COMMERCIAL

## 2019-05-08 ENCOUNTER — PRENATAL OFFICE VISIT (OUTPATIENT)
Dept: OBGYN | Facility: CLINIC | Age: 34
End: 2019-05-08
Payer: COMMERCIAL

## 2019-05-08 ENCOUNTER — HOSPITAL ENCOUNTER (OUTPATIENT)
Dept: ULTRASOUND IMAGING | Facility: CLINIC | Age: 34
Discharge: HOME | End: 2019-05-08
Attending: OBSTETRICS & GYNECOLOGY | Admitting: OBSTETRICS & GYNECOLOGY
Payer: COMMERCIAL

## 2019-05-08 VITALS
HEART RATE: 70 BPM | WEIGHT: 150.3 LBS | BODY MASS INDEX: 26.62 KG/M2 | SYSTOLIC BLOOD PRESSURE: 96 MMHG | DIASTOLIC BLOOD PRESSURE: 65 MMHG

## 2019-05-08 DIAGNOSIS — E70.1 PHENYLKETONURIA (PKU) (H): Primary | ICD-10-CM

## 2019-05-08 DIAGNOSIS — E70.1 MATERNAL PHENYLKETONURIA IN SECOND TRIMESTER (H): Primary | ICD-10-CM

## 2019-05-08 DIAGNOSIS — O99.282 MATERNAL PHENYLKETONURIA IN SECOND TRIMESTER (H): Primary | ICD-10-CM

## 2019-05-08 DIAGNOSIS — E70.1 PHENYLKETONURIA (PKU) (H): ICD-10-CM

## 2019-05-08 PROCEDURE — 76811 OB US DETAILED SNGL FETUS: CPT

## 2019-05-08 PROCEDURE — 99207 ZZC PRENATAL VISIT: CPT | Performed by: OBSTETRICS & GYNECOLOGY

## 2019-05-08 NOTE — PROGRESS NOTES
Please see ultrasound report under imaging tab for details on ultrasound performed today.    Billie Tripathi MD  , OB/GYN  Maternal-Fetal Medicine  [email or web address]  [phone] (Academic office)  [phone] (Pager)

## 2019-05-08 NOTE — PROGRESS NOTES
"19w5d feeling ok, some \"seeing stars\" when she drives her husbands's car with the heated seats.  Discussed likely overheating a little.  Recommend cooler air from vents if wants seat warmers for back.  Starting to feel mvmt.  Had level II today, did not find out gender.  Has next us and fetal echo scheduled around 23 weeks.  Will do visit with me same day and then glucola next visit after that.  jlp  "

## 2019-05-09 ENCOUNTER — TELEPHONE (OUTPATIENT)
Dept: NUTRITION | Facility: CLINIC | Age: 34
End: 2019-05-09

## 2019-05-14 DIAGNOSIS — E70.1 PHENYLKETONURIA (PKU) (H): ICD-10-CM

## 2019-05-14 PROCEDURE — 84510 ASSAY OF TYROSINE: CPT | Performed by: PEDIATRICS

## 2019-05-29 ENCOUNTER — HOSPITAL ENCOUNTER (OUTPATIENT)
Dept: CARDIOLOGY | Facility: CLINIC | Age: 34
Discharge: HOME | End: 2019-05-29
Attending: OBSTETRICS & GYNECOLOGY | Admitting: OBSTETRICS & GYNECOLOGY
Payer: COMMERCIAL

## 2019-05-29 ENCOUNTER — HOSPITAL ENCOUNTER (OUTPATIENT)
Dept: ULTRASOUND IMAGING | Facility: CLINIC | Age: 34
End: 2019-05-29
Attending: OBSTETRICS & GYNECOLOGY
Payer: COMMERCIAL

## 2019-05-29 ENCOUNTER — OFFICE VISIT (OUTPATIENT)
Dept: MATERNAL FETAL MEDICINE | Facility: CLINIC | Age: 34
End: 2019-05-29
Attending: OBSTETRICS & GYNECOLOGY
Payer: COMMERCIAL

## 2019-05-29 DIAGNOSIS — E70.1 PHENYLKETONURIA (PKU) (H): Primary | ICD-10-CM

## 2019-05-29 DIAGNOSIS — O09.92 HIGH-RISK PREGNANCY IN SECOND TRIMESTER: ICD-10-CM

## 2019-05-29 DIAGNOSIS — E70.1 PHENYLKETONURIA (PKU) (H): ICD-10-CM

## 2019-05-29 PROCEDURE — 76825 ECHO EXAM OF FETAL HEART: CPT

## 2019-05-29 PROCEDURE — 76816 OB US FOLLOW-UP PER FETUS: CPT

## 2019-05-29 NOTE — PROGRESS NOTES
"Please see \"Imaging\" tab under \"Chart Review\" for details of today's US.      Anastasia Hinkle, DO  Maternal-Fetal Medicine        "

## 2019-06-03 DIAGNOSIS — E70.1 PHENYLKETONURIA (PKU) (H): ICD-10-CM

## 2019-06-04 ENCOUNTER — TELEPHONE (OUTPATIENT)
Dept: NUTRITION | Facility: CLINIC | Age: 34
End: 2019-06-04

## 2019-06-04 LAB
PHE SERPL-MCNC: 2.3 MG/DL (ref 0.5–1.6)
TYROSINE SERPL-MCNC: 0.5 MG/DL (ref 0.6–2.4)

## 2019-06-10 ENCOUNTER — HOSPITAL ENCOUNTER (OUTPATIENT)
Facility: CLINIC | Age: 34
Setting detail: SPECIMEN
Discharge: HOME | End: 2019-06-10
Admitting: PEDIATRICS
Payer: COMMERCIAL

## 2019-06-10 PROCEDURE — 84510 ASSAY OF TYROSINE: CPT | Performed by: PEDIATRICS

## 2019-06-12 LAB
PHE SERPL-MCNC: 1.8 MG/DL (ref 0.5–1.6)
TYROSINE SERPL-MCNC: 0.6 MG/DL (ref 0.6–2.4)

## 2019-06-13 ENCOUNTER — TELEPHONE (OUTPATIENT)
Dept: NUTRITION | Facility: CLINIC | Age: 34
End: 2019-06-13

## 2019-06-19 ENCOUNTER — PRENATAL OFFICE VISIT (OUTPATIENT)
Dept: OBGYN | Facility: CLINIC | Age: 34
End: 2019-06-19
Payer: COMMERCIAL

## 2019-06-19 ENCOUNTER — TELEPHONE (OUTPATIENT)
Dept: OBGYN | Facility: CLINIC | Age: 34
End: 2019-06-19

## 2019-06-19 ENCOUNTER — OFFICE VISIT (OUTPATIENT)
Dept: MATERNAL FETAL MEDICINE | Facility: CLINIC | Age: 34
End: 2019-06-19
Attending: OBSTETRICS & GYNECOLOGY
Payer: COMMERCIAL

## 2019-06-19 ENCOUNTER — HOSPITAL ENCOUNTER (OUTPATIENT)
Dept: ULTRASOUND IMAGING | Facility: CLINIC | Age: 34
Discharge: HOME | End: 2019-06-19
Attending: OBSTETRICS & GYNECOLOGY | Admitting: OBSTETRICS & GYNECOLOGY
Payer: COMMERCIAL

## 2019-06-19 VITALS
BODY MASS INDEX: 28.17 KG/M2 | DIASTOLIC BLOOD PRESSURE: 70 MMHG | OXYGEN SATURATION: 100 % | WEIGHT: 159 LBS | SYSTOLIC BLOOD PRESSURE: 106 MMHG | HEART RATE: 97 BPM

## 2019-06-19 DIAGNOSIS — E70.1 MATERNAL PHENYLKETONURIA IN SECOND TRIMESTER (H): Primary | ICD-10-CM

## 2019-06-19 DIAGNOSIS — O34.219 PREVIOUS CESAREAN DELIVERY, ANTEPARTUM CONDITION OR COMPLICATION: ICD-10-CM

## 2019-06-19 DIAGNOSIS — E70.1 MATERNAL PHENYLKETONURIA IN SECOND TRIMESTER (H): ICD-10-CM

## 2019-06-19 DIAGNOSIS — O09.92 HIGH-RISK PREGNANCY IN SECOND TRIMESTER: ICD-10-CM

## 2019-06-19 DIAGNOSIS — O99.282 MATERNAL PHENYLKETONURIA IN SECOND TRIMESTER (H): ICD-10-CM

## 2019-06-19 DIAGNOSIS — E70.1 PHENYLKETONURIA (PKU) (H): Primary | ICD-10-CM

## 2019-06-19 DIAGNOSIS — E70.1 PHENYLKETONURIA (PKU) (H): ICD-10-CM

## 2019-06-19 DIAGNOSIS — O99.282 MATERNAL PHENYLKETONURIA IN SECOND TRIMESTER (H): Primary | ICD-10-CM

## 2019-06-19 LAB
GLUCOSE 1H P 50 G GLC PO SERPL-MCNC: 140 MG/DL (ref 60–129)
HGB BLD-MCNC: 10.8 G/DL (ref 11.7–15.7)

## 2019-06-19 PROCEDURE — 36415 COLL VENOUS BLD VENIPUNCTURE: CPT | Performed by: OBSTETRICS & GYNECOLOGY

## 2019-06-19 PROCEDURE — 82950 GLUCOSE TEST: CPT | Performed by: OBSTETRICS & GYNECOLOGY

## 2019-06-19 PROCEDURE — 99207 ZZC PRENATAL VISIT: CPT | Performed by: OBSTETRICS & GYNECOLOGY

## 2019-06-19 PROCEDURE — 76816 OB US FOLLOW-UP PER FETUS: CPT

## 2019-06-19 PROCEDURE — 00000218 ZZHCL STATISTIC OBHBG - HEMOGLOBIN: Performed by: OBSTETRICS & GYNECOLOGY

## 2019-06-19 NOTE — PROGRESS NOTES
"Please see \"Imaging\" tab under \"Chart Review\" for details of today's US at the [facility].    Dave Crane MD  Maternal-Fetal Medicine      "

## 2019-06-19 NOTE — PROGRESS NOTES
25w5d feeling good, no c/o.  Good fm.  Had growth today, 51%tile.  Discussed delivery and recommendation from [facility] for delivery at 39wks.  We discussed if makes it to scheduled date, would need c/s.  She is wondering if we can push it back a little to give more opportunity for TOLAC.  I'll speak with [facility] and let her know.  PKU levels are good.  glucola today.  RTC 4 weeks  jlp

## 2019-06-20 PROCEDURE — 84510 ASSAY OF TYROSINE: CPT | Performed by: PEDIATRICS

## 2019-06-25 DIAGNOSIS — E70.1 MATERNAL PHENYLKETONURIA IN SECOND TRIMESTER (H): ICD-10-CM

## 2019-06-25 DIAGNOSIS — O99.282 MATERNAL PHENYLKETONURIA IN SECOND TRIMESTER (H): ICD-10-CM

## 2019-06-25 LAB
GLUCOSE 1H P 100 G GLC PO SERPL-MCNC: 122 MG/DL (ref 60–179)
GLUCOSE 2H P 100 G GLC PO SERPL-MCNC: 110 MG/DL (ref 60–154)
GLUCOSE 3H P 100 G GLC PO SERPL-MCNC: 101 MG/DL (ref 60–139)
GLUCOSE P FAST SERPL-MCNC: 80 MG/DL (ref 60–94)

## 2019-06-25 PROCEDURE — 82951 GLUCOSE TOLERANCE TEST (GTT): CPT | Performed by: OBSTETRICS & GYNECOLOGY

## 2019-06-25 PROCEDURE — 82952 GTT-ADDED SAMPLES: CPT | Performed by: OBSTETRICS & GYNECOLOGY

## 2019-06-25 PROCEDURE — 36415 COLL VENOUS BLD VENIPUNCTURE: CPT | Performed by: OBSTETRICS & GYNECOLOGY

## 2019-07-01 ENCOUNTER — TELEPHONE (OUTPATIENT)
Dept: OBGYN | Facility: CLINIC | Age: 34
End: 2019-07-01

## 2019-07-01 NOTE — TELEPHONE ENCOUNTER
TC to patient. Discussed message below. Pt is sure she had chicken pox. Pt will call back with any questions or concerns.   Monae Mcdonnell RN-BSN

## 2019-07-01 NOTE — TELEPHONE ENCOUNTER
If patient herself had chickenpox then she is immune and cannot get it again.    If patient is sure that she had chickenpox no further evaluation or treatment  is needed.    If unsure we should check a varicella titer to evaluate further.    Nia Nelson

## 2019-07-01 NOTE — TELEPHONE ENCOUNTER
Patient is 27w3d, c/o possible chicken pox exposure. Son has a couple of spots on shoulders, knees and this morning 5 more spots popped up over night. Son has an appt tomorrow with pediatrician. Pt asked if there is something that she needs to do since she is pregnant. Pt had chicken pox growing up when she was 7. Discussed with patient avoiding contact with any of the spots and good hand hygiene. Routing to on-call provider. Please advise. Thanks.   Monae Mcdonnell, [place]-BSN

## 2019-07-09 LAB
PHE SERPL-MCNC: 3.5 MG/DL (ref 0.5–1.6)
TYROSINE SERPL-MCNC: 0.7 MG/DL (ref 0.6–2.4)

## 2019-07-10 ENCOUNTER — HOSPITAL ENCOUNTER (OUTPATIENT)
Dept: ULTRASOUND IMAGING | Facility: CLINIC | Age: 34
Discharge: HOME | End: 2019-07-10
Attending: OBSTETRICS & GYNECOLOGY | Admitting: OBSTETRICS & GYNECOLOGY
Payer: COMMERCIAL

## 2019-07-10 ENCOUNTER — OFFICE VISIT (OUTPATIENT)
Dept: MATERNAL FETAL MEDICINE | Facility: CLINIC | Age: 34
End: 2019-07-10
Attending: OBSTETRICS & GYNECOLOGY
Payer: COMMERCIAL

## 2019-07-10 ENCOUNTER — TELEPHONE (OUTPATIENT)
Dept: NUTRITION | Facility: CLINIC | Age: 34
End: 2019-07-10

## 2019-07-10 DIAGNOSIS — E70.1 MATERNAL PHENYLKETONURIA IN THIRD TRIMESTER (H): Primary | ICD-10-CM

## 2019-07-10 DIAGNOSIS — E70.1 PHENYLKETONURIA (PKU) (H): ICD-10-CM

## 2019-07-10 DIAGNOSIS — O99.283 MATERNAL PHENYLKETONURIA IN THIRD TRIMESTER (H): Primary | ICD-10-CM

## 2019-07-10 PROCEDURE — 76816 OB US FOLLOW-UP PER FETUS: CPT

## 2019-07-10 NOTE — PROGRESS NOTES
"Please see \"Imaging\" tab under \"Chart Review\" for details of today's ultrasound.    Donte Shepherd M.D.  Specialist in Maternal-Fetal Medicine     "

## 2019-07-11 PROCEDURE — 84510 ASSAY OF TYROSINE: CPT | Performed by: PEDIATRICS

## 2019-07-17 ENCOUNTER — PRENATAL OFFICE VISIT (OUTPATIENT)
Dept: OBGYN | Facility: CLINIC | Age: 34
End: 2019-07-17
Payer: COMMERCIAL

## 2019-07-17 VITALS
HEART RATE: 91 BPM | WEIGHT: 164.2 LBS | DIASTOLIC BLOOD PRESSURE: 71 MMHG | SYSTOLIC BLOOD PRESSURE: 123 MMHG | BODY MASS INDEX: 29.09 KG/M2

## 2019-07-17 DIAGNOSIS — Z23 NEED FOR TDAP VACCINATION: ICD-10-CM

## 2019-07-17 DIAGNOSIS — O99.283 MATERNAL PHENYLKETONURIA IN THIRD TRIMESTER (H): Primary | ICD-10-CM

## 2019-07-17 DIAGNOSIS — E70.1 MATERNAL PHENYLKETONURIA IN THIRD TRIMESTER (H): Primary | ICD-10-CM

## 2019-07-17 DIAGNOSIS — O34.219 PREVIOUS CESAREAN DELIVERY, ANTEPARTUM CONDITION OR COMPLICATION: ICD-10-CM

## 2019-07-17 PROCEDURE — 90471 IMMUNIZATION ADMIN: CPT | Performed by: OBSTETRICS & GYNECOLOGY

## 2019-07-17 PROCEDURE — 90715 TDAP VACCINE 7 YRS/> IM: CPT | Performed by: OBSTETRICS & GYNECOLOGY

## 2019-07-17 PROCEDURE — 99207 ZZC PRENATAL VISIT: CPT | Performed by: OBSTETRICS & GYNECOLOGY

## 2019-07-17 NOTE — PROGRESS NOTES
29w5d feeling ok, starting to get more tired.  Good fm.  Levels have been good.  Discussed delivery timing with MFM last visit and they said by 40wks, earlier if indicated by baby's growth, etc.  Will schedule c/s at 40wks and she will likely TOLAC if labors prior.  Needs to sign consent.  Tdap today RTC 2 weeks  jlp

## 2019-07-18 ENCOUNTER — TELEPHONE (OUTPATIENT)
Dept: NUTRITION | Facility: CLINIC | Age: 34
End: 2019-07-18

## 2019-07-18 ENCOUNTER — TELEPHONE (OUTPATIENT)
Dept: OBGYN | Facility: CLINIC | Age: 34
End: 2019-07-18

## 2019-07-18 LAB
PHE SERPL-MCNC: 2.2 MG/DL (ref 0.5–1.6)
TYROSINE SERPL-MCNC: 0.8 MG/DL (ref 0.6–2.4)

## 2019-07-24 ENCOUNTER — TRANSFERRED RECORDS (OUTPATIENT)
Dept: HEALTH INFORMATION MANAGEMENT | Facility: CLINIC | Age: 34
End: 2019-07-24

## 2019-07-29 ENCOUNTER — TRANSFERRED RECORDS (OUTPATIENT)
Dept: HEALTH INFORMATION MANAGEMENT | Facility: CLINIC | Age: 34
End: 2019-07-29

## 2019-07-30 ENCOUNTER — HOSPITAL ENCOUNTER (OUTPATIENT)
Dept: ULTRASOUND IMAGING | Facility: CLINIC | Age: 34
Discharge: HOME | End: 2019-07-30
Attending: OBSTETRICS & GYNECOLOGY | Admitting: OBSTETRICS & GYNECOLOGY
Payer: COMMERCIAL

## 2019-07-30 ENCOUNTER — OFFICE VISIT (OUTPATIENT)
Dept: MATERNAL FETAL MEDICINE | Facility: CLINIC | Age: 34
End: 2019-07-30
Attending: OBSTETRICS & GYNECOLOGY
Payer: COMMERCIAL

## 2019-07-30 DIAGNOSIS — E70.1 MATERNAL PHENYLKETONURIA IN THIRD TRIMESTER (H): Primary | ICD-10-CM

## 2019-07-30 DIAGNOSIS — O99.283 MATERNAL PHENYLKETONURIA IN THIRD TRIMESTER (H): Primary | ICD-10-CM

## 2019-07-30 DIAGNOSIS — E70.1 MATERNAL PHENYLKETONURIA IN THIRD TRIMESTER (H): ICD-10-CM

## 2019-07-30 DIAGNOSIS — O99.283 MATERNAL PHENYLKETONURIA IN THIRD TRIMESTER (H): ICD-10-CM

## 2019-07-30 PROCEDURE — 76816 OB US FOLLOW-UP PER FETUS: CPT

## 2019-07-30 NOTE — PROGRESS NOTES
"Please see \"Imaging\" tab under \"Chart Review\" for details of today's US.    Crystal Dunbar, DO    "

## 2019-07-31 ENCOUNTER — PRENATAL OFFICE VISIT (OUTPATIENT)
Dept: OBGYN | Facility: CLINIC | Age: 34
End: 2019-07-31
Payer: COMMERCIAL

## 2019-07-31 VITALS
WEIGHT: 165.4 LBS | SYSTOLIC BLOOD PRESSURE: 101 MMHG | BODY MASS INDEX: 29.3 KG/M2 | TEMPERATURE: 99 F | DIASTOLIC BLOOD PRESSURE: 61 MMHG | HEART RATE: 85 BPM

## 2019-07-31 DIAGNOSIS — E70.1 MATERNAL PHENYLKETONURIA IN THIRD TRIMESTER (H): Primary | ICD-10-CM

## 2019-07-31 DIAGNOSIS — O99.283 MATERNAL PHENYLKETONURIA IN THIRD TRIMESTER (H): Primary | ICD-10-CM

## 2019-07-31 PROCEDURE — 99207 ZZC PRENATAL VISIT: CPT | Performed by: OBSTETRICS & GYNECOLOGY

## 2019-07-31 NOTE — PROGRESS NOTES
31w5d feeling ok, getting a little more uncomfortable now, but not bad.  Good fm.  Numbers are good.  Has decided to go for c/s at 39wks, will call hannah to schedule.  Growth yesterday at 67%tile.  RTC 2 weeks  jlp

## 2019-08-01 PROCEDURE — 84510 ASSAY OF TYROSINE: CPT | Performed by: PEDIATRICS

## 2019-08-06 NOTE — TELEPHONE ENCOUNTER
Type of surgery: ob  Location of surgery: [facility] OR  Date and time of surgery: 9*25/19 1030  Surgeon: Brain  Pre-Op Appt Date: tbd  Post-Op Appt Date: 6 weeks   Packet sent out: Yes  Pre-cert/Authorization completed:  No  Date: 8/6/2019    Gay CHAMBERS, Surgery Coordinator

## 2019-08-14 ENCOUNTER — PRENATAL OFFICE VISIT (OUTPATIENT)
Dept: OBGYN | Facility: CLINIC | Age: 34
End: 2019-08-14
Payer: COMMERCIAL

## 2019-08-14 ENCOUNTER — TELEPHONE (OUTPATIENT)
Dept: NUTRITION | Facility: CLINIC | Age: 34
End: 2019-08-14

## 2019-08-14 VITALS
DIASTOLIC BLOOD PRESSURE: 60 MMHG | SYSTOLIC BLOOD PRESSURE: 99 MMHG | BODY MASS INDEX: 29.64 KG/M2 | HEART RATE: 94 BPM | WEIGHT: 167.3 LBS

## 2019-08-14 DIAGNOSIS — E70.1 MATERNAL PHENYLKETONURIA IN THIRD TRIMESTER (H): Primary | ICD-10-CM

## 2019-08-14 DIAGNOSIS — O99.283 MATERNAL PHENYLKETONURIA IN THIRD TRIMESTER (H): Primary | ICD-10-CM

## 2019-08-14 DIAGNOSIS — O34.219 PREVIOUS CESAREAN DELIVERY, ANTEPARTUM CONDITION OR COMPLICATION: ICD-10-CM

## 2019-08-14 LAB
PHE SERPL-MCNC: 2.2 MG/DL (ref 0.5–1.6)
TYROSINE SERPL-MCNC: 0.7 MG/DL (ref 0.6–2.4)

## 2019-08-14 PROCEDURE — 99207 ZZC PRENATAL VISIT: CPT | Performed by: OBSTETRICS & GYNECOLOGY

## 2019-08-14 NOTE — PROGRESS NOTES
33w5d Feeling ok, no c/o.  Good fm.  C/s is scheduled, discussed some questions regarding flow of day.  RTC 2 weeks  tammie

## 2019-08-21 ENCOUNTER — TELEPHONE (OUTPATIENT)
Dept: OBGYN | Facility: CLINIC | Age: 34
End: 2019-08-21

## 2019-08-21 ENCOUNTER — OFFICE VISIT (OUTPATIENT)
Dept: MATERNAL FETAL MEDICINE | Facility: CLINIC | Age: 34
End: 2019-08-21
Attending: OBSTETRICS & GYNECOLOGY
Payer: COMMERCIAL

## 2019-08-21 ENCOUNTER — HOSPITAL ENCOUNTER (OUTPATIENT)
Dept: ULTRASOUND IMAGING | Facility: CLINIC | Age: 34
Discharge: HOME | End: 2019-08-21
Attending: OBSTETRICS & GYNECOLOGY | Admitting: OBSTETRICS & GYNECOLOGY
Payer: COMMERCIAL

## 2019-08-21 DIAGNOSIS — E70.1 MATERNAL PHENYLKETONURIA IN THIRD TRIMESTER (H): ICD-10-CM

## 2019-08-21 DIAGNOSIS — E70.1 MATERNAL PHENYLKETONURIA IN THIRD TRIMESTER (H): Primary | ICD-10-CM

## 2019-08-21 DIAGNOSIS — O99.283 MATERNAL PHENYLKETONURIA IN THIRD TRIMESTER (H): ICD-10-CM

## 2019-08-21 DIAGNOSIS — O99.283 MATERNAL PHENYLKETONURIA IN THIRD TRIMESTER (H): Primary | ICD-10-CM

## 2019-08-21 PROCEDURE — 76816 OB US FOLLOW-UP PER FETUS: CPT

## 2019-08-21 NOTE — LETTER
[address] [id number] [phone]      August 21, 2019      Regarding: Laila Cueva  YOB: 1985  [address]  [place] [address]    To Whom It May Concern:     Routine teeth cleaning, use of Novocaine and dental x-rays are safe during all stages of pregnancy. Antibiotics that are category B and narcotic pain medications can also be safely used during all stages of pregnancy. Examples of these include, but are not limited to: amoxicillin, penicillin, Vicodin, Darvocet, Tylenol #3 and Percocet. Root canals are safe during all trimesters of pregnancy.    Please use these guidelines when treating our patients. If you have additional questions or concerns, please call us at [phone].    Sincerely,    Erika De La Paz MD

## 2019-08-21 NOTE — PROGRESS NOTES
"Please see \"Imaging\" tab under Chart Review for full details.    Pam Calvin MD  Maternal Fetal Medicine    "

## 2019-08-21 NOTE — TELEPHONE ENCOUNTER
Patient calling to get letter for dentist. Letter printed and placed at the  for .   Monae Mcdonnell RN-BSN

## 2019-08-22 ENCOUNTER — TELEPHONE (OUTPATIENT)
Dept: CONSULT | Facility: CLINIC | Age: 34
End: 2019-08-22

## 2019-08-22 NOTE — TELEPHONE ENCOUNTER
[facility] Prior Authorization Team   Phone: [phone]  Fax: [phone]    Pro-active PA Initiation    Medication: Kuvan - proactive pa initiated  Insurance Company:    Pharmacy Filling the Rx: [facility] - [place], [place] - [facility] [address]  Filling Pharmacy Phone: [phone]  Filling Pharmacy Fax:    Start Date: 8/22/2019

## 2019-08-27 ENCOUNTER — PRENATAL OFFICE VISIT (OUTPATIENT)
Dept: OBGYN | Facility: CLINIC | Age: 34
End: 2019-08-27
Payer: COMMERCIAL

## 2019-08-27 ENCOUNTER — HOSPITAL ENCOUNTER (OUTPATIENT)
Facility: CLINIC | Age: 34
End: 2019-08-27
Attending: OBSTETRICS & GYNECOLOGY | Admitting: OBSTETRICS & GYNECOLOGY
Payer: COMMERCIAL

## 2019-08-27 VITALS
OXYGEN SATURATION: 98 % | BODY MASS INDEX: 30.11 KG/M2 | SYSTOLIC BLOOD PRESSURE: 107 MMHG | WEIGHT: 170 LBS | HEART RATE: 78 BPM | DIASTOLIC BLOOD PRESSURE: 65 MMHG

## 2019-08-27 DIAGNOSIS — O34.219 PREVIOUS CESAREAN DELIVERY, ANTEPARTUM CONDITION OR COMPLICATION: Primary | ICD-10-CM

## 2019-08-27 PROCEDURE — 99207 ZZC PRENATAL VISIT: CPT | Performed by: OBSTETRICS & GYNECOLOGY

## 2019-09-04 ENCOUNTER — PRENATAL OFFICE VISIT (OUTPATIENT)
Dept: OBGYN | Facility: CLINIC | Age: 34
End: 2019-09-04
Payer: COMMERCIAL

## 2019-09-04 VITALS
WEIGHT: 172.2 LBS | DIASTOLIC BLOOD PRESSURE: 75 MMHG | BODY MASS INDEX: 30.5 KG/M2 | HEART RATE: 92 BPM | SYSTOLIC BLOOD PRESSURE: 115 MMHG

## 2019-09-04 DIAGNOSIS — O34.219 PREVIOUS CESAREAN DELIVERY, ANTEPARTUM CONDITION OR COMPLICATION: Primary | ICD-10-CM

## 2019-09-04 LAB — HGB BLD-MCNC: 10.8 G/DL (ref 11.7–15.7)

## 2019-09-04 PROCEDURE — 87653 STREP B DNA AMP PROBE: CPT | Performed by: OBSTETRICS & GYNECOLOGY

## 2019-09-04 PROCEDURE — 00000218 ZZHCL STATISTIC OBHBG - HEMOGLOBIN: Performed by: OBSTETRICS & GYNECOLOGY

## 2019-09-04 PROCEDURE — 99207 ZZC PRENATAL VISIT: CPT | Performed by: OBSTETRICS & GYNECOLOGY

## 2019-09-04 PROCEDURE — 36416 COLLJ CAPILLARY BLOOD SPEC: CPT | Performed by: OBSTETRICS & GYNECOLOGY

## 2019-09-04 PROCEDURE — 87186 SC STD MICRODIL/AGAR DIL: CPT | Performed by: OBSTETRICS & GYNECOLOGY

## 2019-09-04 ASSESSMENT — PATIENT HEALTH QUESTIONNAIRE - PHQ9: SUM OF ALL RESPONSES TO PHQ QUESTIONS 1-9: 6

## 2019-09-04 NOTE — PROGRESS NOTES
36w5d feeling ok, a little stressed with not being quite ready at home or work.  Good fm.  GBS today.  C/s scheduled 9/25.  RTC weekly  tammie

## 2019-09-04 NOTE — TELEPHONE ENCOUNTER
Prior Authorization Approval    Authorization Effective Date: 9/23/2019  Authorization Expiration Date: 9/23/2020  Medication: Kuvan approved  Approved Dose/Quantity: 100mg tablets  Reference #: [id number]   Insurance Company: [employer] [place] - Phone [phone] Fax [phone]  Expected CoPay: $0     CoPay Card Available:      Foundation Assistance Needed:    Which Pharmacy is filling the prescription (Not needed for infusion/clinic administered): [facility] - [place], [place] - [address]  Pharmacy Notified: Yes  Patient Notified: No

## 2019-09-05 LAB
GP B STREP DNA SPEC QL NAA+PROBE: POSITIVE
SPECIMEN SOURCE: ABNORMAL

## 2019-09-09 LAB
BACTERIA SPEC CULT: ABNORMAL
SPECIMEN SOURCE: ABNORMAL

## 2019-09-11 ENCOUNTER — PRENATAL OFFICE VISIT (OUTPATIENT)
Dept: OBGYN | Facility: CLINIC | Age: 34
End: 2019-09-11
Payer: COMMERCIAL

## 2019-09-11 VITALS
DIASTOLIC BLOOD PRESSURE: 72 MMHG | WEIGHT: 173 LBS | SYSTOLIC BLOOD PRESSURE: 109 MMHG | HEART RATE: 97 BPM | BODY MASS INDEX: 30.65 KG/M2

## 2019-09-11 DIAGNOSIS — O34.219 PREVIOUS CESAREAN DELIVERY, ANTEPARTUM CONDITION OR COMPLICATION: Primary | ICD-10-CM

## 2019-09-11 PROCEDURE — 99207 ZZC PRENATAL VISIT: CPT | Performed by: OBSTETRICS & GYNECOLOGY

## 2019-09-11 RX ORDER — ACETAMINOPHEN 325 MG/1
650 TABLET ORAL EVERY 6 HOURS PRN
Qty: 100 TABLET | Refills: 0 | COMMUNITY
Start: 2019-09-11 | End: 2019-11-07

## 2019-09-11 RX ORDER — IBUPROFEN 600 MG/1
600 TABLET, FILM COATED ORAL EVERY 6 HOURS PRN
Qty: 60 TABLET | Refills: 0 | COMMUNITY
Start: 2019-09-11 | End: 2019-11-07

## 2019-09-11 RX ORDER — AMOXICILLIN 250 MG
1 CAPSULE ORAL DAILY
Qty: 100 TABLET | Refills: 0 | Status: SHIPPED | OUTPATIENT
Start: 2019-09-11 | End: 2019-11-07

## 2019-09-11 NOTE — PROGRESS NOTES
37w5d feeling ok, no c/o.  Good fm.  GBS positive, discussed.  She has ibuprofen and tylenol at home, needs rx for senna.  RTC weekly  joanp

## 2019-09-16 DIAGNOSIS — Z01.818 PRE-OP EXAM: Primary | ICD-10-CM

## 2019-09-16 DIAGNOSIS — O34.219 PREVIOUS CESAREAN DELIVERY, ANTEPARTUM CONDITION OR COMPLICATION: ICD-10-CM

## 2019-09-19 ENCOUNTER — OFFICE VISIT (OUTPATIENT)
Dept: MATERNAL FETAL MEDICINE | Facility: CLINIC | Age: 34
End: 2019-09-19
Attending: OBSTETRICS & GYNECOLOGY
Payer: COMMERCIAL

## 2019-09-19 ENCOUNTER — HOSPITAL ENCOUNTER (OUTPATIENT)
Dept: ULTRASOUND IMAGING | Facility: CLINIC | Age: 34
Discharge: HOME | End: 2019-09-19
Attending: OBSTETRICS & GYNECOLOGY | Admitting: OBSTETRICS & GYNECOLOGY
Payer: COMMERCIAL

## 2019-09-19 ENCOUNTER — PRENATAL OFFICE VISIT (OUTPATIENT)
Dept: OBGYN | Facility: CLINIC | Age: 34
End: 2019-09-19
Payer: COMMERCIAL

## 2019-09-19 VITALS
DIASTOLIC BLOOD PRESSURE: 75 MMHG | HEART RATE: 88 BPM | WEIGHT: 174.6 LBS | SYSTOLIC BLOOD PRESSURE: 114 MMHG | BODY MASS INDEX: 30.94 KG/M2 | HEIGHT: 63 IN | OXYGEN SATURATION: 96 %

## 2019-09-19 DIAGNOSIS — Z23 NEED FOR PROPHYLACTIC VACCINATION AND INOCULATION AGAINST INFLUENZA: ICD-10-CM

## 2019-09-19 DIAGNOSIS — E70.1 MATERNAL PHENYLKETONURIA IN THIRD TRIMESTER (H): Primary | ICD-10-CM

## 2019-09-19 DIAGNOSIS — O99.283 MATERNAL PHENYLKETONURIA IN THIRD TRIMESTER (H): ICD-10-CM

## 2019-09-19 DIAGNOSIS — E70.1 MATERNAL PHENYLKETONURIA IN THIRD TRIMESTER (H): ICD-10-CM

## 2019-09-19 DIAGNOSIS — O34.219 PREVIOUS CESAREAN DELIVERY, ANTEPARTUM CONDITION OR COMPLICATION: Primary | ICD-10-CM

## 2019-09-19 DIAGNOSIS — O99.283 MATERNAL PHENYLKETONURIA IN THIRD TRIMESTER (H): Primary | ICD-10-CM

## 2019-09-19 PROCEDURE — 99207 ZZC PRENATAL VISIT: CPT | Performed by: OBSTETRICS & GYNECOLOGY

## 2019-09-19 PROCEDURE — 90471 IMMUNIZATION ADMIN: CPT | Performed by: OBSTETRICS & GYNECOLOGY

## 2019-09-19 PROCEDURE — 76816 OB US FOLLOW-UP PER FETUS: CPT

## 2019-09-19 PROCEDURE — 90686 IIV4 VACC NO PRSV 0.5 ML IM: CPT | Performed by: OBSTETRICS & GYNECOLOGY

## 2019-09-19 ASSESSMENT — MIFFLIN-ST. JEOR: SCORE: 1471.11

## 2019-09-19 NOTE — PROGRESS NOTES
Please see the imaging tab for details of the ultrasound performed today.    Janey Poole MD  Specialist in Maternal-Fetal Medicine

## 2019-09-19 NOTE — PROGRESS NOTES
Repeat c/s was discussed in detail including risk of bleeding, infection, damage to abdominal organs including bowel, bladder, blood vessels, nerves, and baby.   Recovery period/hosptial stay and restrictions discussed.  Pain medications after surgery were discussed.  All questions were answered. BE

## 2019-09-19 NOTE — NURSING NOTE
Clinic Administered Medication Documentation    MEDICATION LIST:   Injectable Medication Documentation    Patient was given influenza vaccine. Prior to medication administration, verified patients identity using patient s name and date of birth. Please see MAR and medication order for additional information. Patient instructed to remain in clinic for 15 minutes.      Was entire vial of medication used? Yes  Vial/Syringe: Single dose vial  Expiration Date:  6/30/20  Was this medication supplied by the patient? No

## 2019-09-23 DIAGNOSIS — Z01.818 PRE-OP EXAM: ICD-10-CM

## 2019-09-23 DIAGNOSIS — O34.219 PREVIOUS CESAREAN DELIVERY, ANTEPARTUM CONDITION OR COMPLICATION: ICD-10-CM

## 2019-09-23 LAB
ABO + RH BLD: NORMAL
ABO + RH BLD: NORMAL
BLD GP AB SCN SERPL QL: NORMAL
BLOOD BANK CMNT PATIENT-IMP: NORMAL
ERYTHROCYTE [DISTWIDTH] IN BLOOD BY AUTOMATED COUNT: 14.1 % (ref 10–15)
HCT VFR BLD AUTO: 33.2 % (ref 35–47)
HGB BLD-MCNC: 11.1 G/DL (ref 11.7–15.7)
MCH RBC QN AUTO: 30.3 PG (ref 26.5–33)
MCHC RBC AUTO-ENTMCNC: 33.4 G/DL (ref 31.5–36.5)
MCV RBC AUTO: 91 FL (ref 78–100)
PLATELET # BLD AUTO: 141 10E9/L (ref 150–450)
RBC # BLD AUTO: 3.66 10E12/L (ref 3.8–5.2)
SPECIMEN EXP DATE BLD: NORMAL
WBC # BLD AUTO: 7.8 10E9/L (ref 4–11)

## 2019-09-23 PROCEDURE — 36415 COLL VENOUS BLD VENIPUNCTURE: CPT | Performed by: OBSTETRICS & GYNECOLOGY

## 2019-09-23 PROCEDURE — 86850 RBC ANTIBODY SCREEN: CPT | Performed by: OBSTETRICS & GYNECOLOGY

## 2019-09-23 PROCEDURE — 86901 BLOOD TYPING SEROLOGIC RH(D): CPT | Performed by: OBSTETRICS & GYNECOLOGY

## 2019-09-23 PROCEDURE — 86900 BLOOD TYPING SEROLOGIC ABO: CPT | Performed by: OBSTETRICS & GYNECOLOGY

## 2019-09-23 PROCEDURE — 86780 TREPONEMA PALLIDUM: CPT | Performed by: OBSTETRICS & GYNECOLOGY

## 2019-09-23 PROCEDURE — 85027 COMPLETE CBC AUTOMATED: CPT | Performed by: OBSTETRICS & GYNECOLOGY

## 2019-09-24 ENCOUNTER — ANESTHESIA EVENT (OUTPATIENT)
Dept: OBGYN | Facility: CLINIC | Age: 34
End: 2019-09-24
Payer: COMMERCIAL

## 2019-09-24 LAB — T PALLIDUM AB SER QL: NONREACTIVE

## 2019-09-25 ENCOUNTER — ANESTHESIA (OUTPATIENT)
Dept: OBGYN | Facility: CLINIC | Age: 34
End: 2019-09-25
Payer: COMMERCIAL

## 2019-09-25 ENCOUNTER — HOSPITAL ENCOUNTER (INPATIENT)
Facility: CLINIC | Age: 34
LOS: 3 days | Discharge: HOME HEALTH | End: 2019-09-28
Attending: OBSTETRICS & GYNECOLOGY | Admitting: OBSTETRICS & GYNECOLOGY
Payer: COMMERCIAL

## 2019-09-25 ENCOUNTER — HOME INFUSION (PRE-WILLOW HOME INFUSION) (OUTPATIENT)
Dept: PHARMACY | Facility: CLINIC | Age: 34
End: 2019-09-25

## 2019-09-25 PROCEDURE — 71000014 ZZH RECOVERY PHASE 1 LEVEL 2 FIRST HR: Performed by: OBSTETRICS & GYNECOLOGY

## 2019-09-25 PROCEDURE — 59510 CESAREAN DELIVERY: CPT | Mod: GC | Performed by: OBSTETRICS & GYNECOLOGY

## 2019-09-25 PROCEDURE — 40000170 ZZH STATISTIC PRE-PROCEDURE ASSESSMENT II: Performed by: OBSTETRICS & GYNECOLOGY

## 2019-09-25 PROCEDURE — C1765 ADHESION BARRIER: HCPCS | Performed by: OBSTETRICS & GYNECOLOGY

## 2019-09-25 PROCEDURE — 25000125 ZZHC RX 250: Performed by: STUDENT IN AN ORGANIZED HEALTH CARE EDUCATION/TRAINING PROGRAM

## 2019-09-25 PROCEDURE — 25000566 ZZH SEVOFLURANE, EA 15 MIN: Performed by: OBSTETRICS & GYNECOLOGY

## 2019-09-25 PROCEDURE — 71000015 ZZH RECOVERY PHASE 1 LEVEL 2 EA ADDTL HR: Performed by: OBSTETRICS & GYNECOLOGY

## 2019-09-25 PROCEDURE — 27110028 ZZH OR GENERAL SUPPLY NON-STERILE: Performed by: OBSTETRICS & GYNECOLOGY

## 2019-09-25 PROCEDURE — 25800030 ZZH RX IP 258 OP 636: Performed by: STUDENT IN AN ORGANIZED HEALTH CARE EDUCATION/TRAINING PROGRAM

## 2019-09-25 PROCEDURE — 25000132 ZZH RX MED GY IP 250 OP 250 PS 637: Performed by: STUDENT IN AN ORGANIZED HEALTH CARE EDUCATION/TRAINING PROGRAM

## 2019-09-25 PROCEDURE — 12000001 ZZH R&B MED SURG/OB UMMC

## 2019-09-25 PROCEDURE — 27210794 ZZH OR GENERAL SUPPLY STERILE: Performed by: OBSTETRICS & GYNECOLOGY

## 2019-09-25 PROCEDURE — 25000128 H RX IP 250 OP 636: Performed by: STUDENT IN AN ORGANIZED HEALTH CARE EDUCATION/TRAINING PROGRAM

## 2019-09-25 PROCEDURE — C9290 INJ, BUPIVACAINE LIPOSOME: HCPCS | Performed by: STUDENT IN AN ORGANIZED HEALTH CARE EDUCATION/TRAINING PROGRAM

## 2019-09-25 PROCEDURE — 36000059 ZZH SURGERY LEVEL 3 EA 15 ADDTL MIN UMMC: Performed by: OBSTETRICS & GYNECOLOGY

## 2019-09-25 PROCEDURE — 37000009 ZZH ANESTHESIA TECHNICAL FEE, EACH ADDTL 15 MIN: Performed by: OBSTETRICS & GYNECOLOGY

## 2019-09-25 PROCEDURE — 36000057 ZZH SURGERY LEVEL 3 1ST 30 MIN - UMMC: Performed by: OBSTETRICS & GYNECOLOGY

## 2019-09-25 PROCEDURE — 37000008 ZZH ANESTHESIA TECHNICAL FEE, 1ST 30 MIN: Performed by: OBSTETRICS & GYNECOLOGY

## 2019-09-25 PROCEDURE — 25000125 ZZHC RX 250: Performed by: OBSTETRICS & GYNECOLOGY

## 2019-09-25 RX ORDER — CITRIC ACID/SODIUM CITRATE 334-500MG
30 SOLUTION, ORAL ORAL ONCE
Status: COMPLETED | OUTPATIENT
Start: 2019-09-25 | End: 2019-09-25

## 2019-09-25 RX ORDER — KETOROLAC TROMETHAMINE 30 MG/ML
30 INJECTION, SOLUTION INTRAMUSCULAR; INTRAVENOUS
Status: COMPLETED | OUTPATIENT
Start: 2019-09-25 | End: 2019-09-25

## 2019-09-25 RX ORDER — IBUPROFEN 800 MG/1
800 TABLET, FILM COATED ORAL EVERY 6 HOURS PRN
Status: DISCONTINUED | OUTPATIENT
Start: 2019-09-26 | End: 2019-09-28 | Stop reason: HOSPADM

## 2019-09-25 RX ORDER — LABETALOL 20 MG/4 ML (5 MG/ML) INTRAVENOUS SYRINGE
10
Status: DISCONTINUED | OUTPATIENT
Start: 2019-09-25 | End: 2019-09-25

## 2019-09-25 RX ORDER — CEFAZOLIN SODIUM 2 G/100ML
2 INJECTION, SOLUTION INTRAVENOUS
Status: DISCONTINUED | OUTPATIENT
Start: 2019-09-25 | End: 2019-09-25

## 2019-09-25 RX ORDER — HYDROMORPHONE HYDROCHLORIDE 1 MG/ML
.3-.5 INJECTION, SOLUTION INTRAMUSCULAR; INTRAVENOUS; SUBCUTANEOUS EVERY 5 MIN PRN
Status: DISCONTINUED | OUTPATIENT
Start: 2019-09-25 | End: 2019-09-25

## 2019-09-25 RX ORDER — BISACODYL 10 MG
10 SUPPOSITORY, RECTAL RECTAL DAILY PRN
Status: DISCONTINUED | OUTPATIENT
Start: 2019-09-27 | End: 2019-09-28 | Stop reason: HOSPADM

## 2019-09-25 RX ORDER — MORPHINE SULFATE 1 MG/ML
150 INJECTION, SOLUTION EPIDURAL; INTRATHECAL; INTRAVENOUS ONCE
Status: COMPLETED | OUTPATIENT
Start: 2019-09-25 | End: 2019-09-25

## 2019-09-25 RX ORDER — DIPHENHYDRAMINE HCL 25 MG
25 CAPSULE ORAL EVERY 6 HOURS PRN
Status: DISCONTINUED | OUTPATIENT
Start: 2019-09-25 | End: 2019-09-28 | Stop reason: HOSPADM

## 2019-09-25 RX ORDER — DEXTROSE, SODIUM CHLORIDE, SODIUM LACTATE, POTASSIUM CHLORIDE, AND CALCIUM CHLORIDE 5; .6; .31; .03; .02 G/100ML; G/100ML; G/100ML; G/100ML; G/100ML
INJECTION, SOLUTION INTRAVENOUS CONTINUOUS
Status: DISCONTINUED | OUTPATIENT
Start: 2019-09-25 | End: 2019-09-28 | Stop reason: HOSPADM

## 2019-09-25 RX ORDER — SIMETHICONE 80 MG
80 TABLET,CHEWABLE ORAL 4 TIMES DAILY PRN
Status: DISCONTINUED | OUTPATIENT
Start: 2019-09-25 | End: 2019-09-28 | Stop reason: HOSPADM

## 2019-09-25 RX ORDER — LANOLIN 100 %
OINTMENT (GRAM) TOPICAL
Status: DISCONTINUED | OUTPATIENT
Start: 2019-09-25 | End: 2019-09-28 | Stop reason: HOSPADM

## 2019-09-25 RX ORDER — FENTANYL CITRATE 50 UG/ML
10 INJECTION, SOLUTION INTRAMUSCULAR; INTRAVENOUS ONCE
Status: COMPLETED | OUTPATIENT
Start: 2019-09-25 | End: 2019-09-25

## 2019-09-25 RX ORDER — BUPIVACAINE HYDROCHLORIDE 7.5 MG/ML
1.8 INJECTION, SOLUTION EPIDURAL; RETROBULBAR ONCE
Status: DISCONTINUED | OUTPATIENT
Start: 2019-09-25 | End: 2019-09-25

## 2019-09-25 RX ORDER — HYDROCORTISONE 2.5 %
CREAM (GRAM) TOPICAL 3 TIMES DAILY PRN
Status: DISCONTINUED | OUTPATIENT
Start: 2019-09-25 | End: 2019-09-28 | Stop reason: HOSPADM

## 2019-09-25 RX ORDER — OXYTOCIN 10 [USP'U]/ML
10 INJECTION, SOLUTION INTRAMUSCULAR; INTRAVENOUS
Status: DISCONTINUED | OUTPATIENT
Start: 2019-09-25 | End: 2019-09-28 | Stop reason: HOSPADM

## 2019-09-25 RX ORDER — SODIUM CHLORIDE, SODIUM LACTATE, POTASSIUM CHLORIDE, CALCIUM CHLORIDE 600; 310; 30; 20 MG/100ML; MG/100ML; MG/100ML; MG/100ML
INJECTION, SOLUTION INTRAVENOUS CONTINUOUS
Status: DISCONTINUED | OUTPATIENT
Start: 2019-09-25 | End: 2019-09-25

## 2019-09-25 RX ORDER — ACETAMINOPHEN 325 MG/1
975 TABLET ORAL ONCE
Status: DISCONTINUED | OUTPATIENT
Start: 2019-09-25 | End: 2019-09-25

## 2019-09-25 RX ORDER — NALOXONE HYDROCHLORIDE 0.4 MG/ML
.1-.4 INJECTION, SOLUTION INTRAMUSCULAR; INTRAVENOUS; SUBCUTANEOUS
Status: DISCONTINUED | OUTPATIENT
Start: 2019-09-25 | End: 2019-09-28 | Stop reason: HOSPADM

## 2019-09-25 RX ORDER — BUPIVACAINE HYDROCHLORIDE 2.5 MG/ML
INJECTION, SOLUTION EPIDURAL; INFILTRATION; INTRACAUDAL PRN
Status: DISCONTINUED | OUTPATIENT
Start: 2019-09-25 | End: 2019-09-25

## 2019-09-25 RX ORDER — FLUMAZENIL 0.1 MG/ML
0.2 INJECTION, SOLUTION INTRAVENOUS
Status: DISCONTINUED | OUTPATIENT
Start: 2019-09-25 | End: 2019-09-25

## 2019-09-25 RX ORDER — CEFAZOLIN SODIUM 2 G/100ML
INJECTION, SOLUTION INTRAVENOUS PRN
Status: DISCONTINUED | OUTPATIENT
Start: 2019-09-25 | End: 2019-09-25

## 2019-09-25 RX ORDER — DIMENHYDRINATE 50 MG/ML
25 INJECTION, SOLUTION INTRAMUSCULAR; INTRAVENOUS
Status: DISCONTINUED | OUTPATIENT
Start: 2019-09-25 | End: 2019-09-25

## 2019-09-25 RX ORDER — OXYTOCIN/0.9 % SODIUM CHLORIDE 30/500 ML
100 PLASTIC BAG, INJECTION (ML) INTRAVENOUS CONTINUOUS
Status: DISCONTINUED | OUTPATIENT
Start: 2019-09-25 | End: 2019-09-28 | Stop reason: HOSPADM

## 2019-09-25 RX ORDER — AMOXICILLIN 250 MG
1 CAPSULE ORAL 2 TIMES DAILY PRN
Status: DISCONTINUED | OUTPATIENT
Start: 2019-09-25 | End: 2019-09-28 | Stop reason: HOSPADM

## 2019-09-25 RX ORDER — OXYTOCIN/0.9 % SODIUM CHLORIDE 30/500 ML
340 PLASTIC BAG, INJECTION (ML) INTRAVENOUS CONTINUOUS PRN
Status: DISCONTINUED | OUTPATIENT
Start: 2019-09-25 | End: 2019-09-28 | Stop reason: HOSPADM

## 2019-09-25 RX ORDER — MISOPROSTOL 200 UG/1
800 TABLET ORAL
Status: DISCONTINUED | OUTPATIENT
Start: 2019-09-25 | End: 2019-09-28 | Stop reason: HOSPADM

## 2019-09-25 RX ORDER — NALOXONE HYDROCHLORIDE 0.4 MG/ML
.1-.4 INJECTION, SOLUTION INTRAMUSCULAR; INTRAVENOUS; SUBCUTANEOUS
Status: DISCONTINUED | OUTPATIENT
Start: 2019-09-25 | End: 2019-09-25

## 2019-09-25 RX ORDER — LIDOCAINE 40 MG/G
CREAM TOPICAL
Status: DISCONTINUED | OUTPATIENT
Start: 2019-09-25 | End: 2019-09-28 | Stop reason: HOSPADM

## 2019-09-25 RX ORDER — MAGNESIUM HYDROXIDE 1200 MG/15ML
LIQUID ORAL PRN
Status: DISCONTINUED | OUTPATIENT
Start: 2019-09-25 | End: 2019-09-28 | Stop reason: HOSPADM

## 2019-09-25 RX ORDER — MEPERIDINE HYDROCHLORIDE 25 MG/ML
12.5 INJECTION INTRAMUSCULAR; INTRAVENOUS; SUBCUTANEOUS EVERY 5 MIN PRN
Status: DISCONTINUED | OUTPATIENT
Start: 2019-09-25 | End: 2019-09-25

## 2019-09-25 RX ORDER — CEFAZOLIN SODIUM 1 G/3ML
1 INJECTION, POWDER, FOR SOLUTION INTRAMUSCULAR; INTRAVENOUS SEE ADMIN INSTRUCTIONS
Status: DISCONTINUED | OUTPATIENT
Start: 2019-09-25 | End: 2019-09-25

## 2019-09-25 RX ORDER — KETOROLAC TROMETHAMINE 30 MG/ML
30 INJECTION, SOLUTION INTRAMUSCULAR; INTRAVENOUS EVERY 6 HOURS
Status: DISPENSED | OUTPATIENT
Start: 2019-09-25 | End: 2019-09-26

## 2019-09-25 RX ORDER — METHYLERGONOVINE MALEATE 0.2 MG/ML
200 INJECTION INTRAVENOUS
Status: DISCONTINUED | OUTPATIENT
Start: 2019-09-25 | End: 2019-09-28 | Stop reason: HOSPADM

## 2019-09-25 RX ORDER — HYDRALAZINE HYDROCHLORIDE 20 MG/ML
2.5-5 INJECTION INTRAMUSCULAR; INTRAVENOUS EVERY 10 MIN PRN
Status: DISCONTINUED | OUTPATIENT
Start: 2019-09-25 | End: 2019-09-25

## 2019-09-25 RX ORDER — NALBUPHINE HYDROCHLORIDE 10 MG/ML
2.5-5 INJECTION, SOLUTION INTRAMUSCULAR; INTRAVENOUS; SUBCUTANEOUS EVERY 6 HOURS PRN
Status: DISCONTINUED | OUTPATIENT
Start: 2019-09-25 | End: 2019-09-25

## 2019-09-25 RX ORDER — OXYCODONE HYDROCHLORIDE 5 MG/1
5-10 TABLET ORAL
Status: DISCONTINUED | OUTPATIENT
Start: 2019-09-25 | End: 2019-09-28 | Stop reason: HOSPADM

## 2019-09-25 RX ORDER — SODIUM CHLORIDE, SODIUM LACTATE, POTASSIUM CHLORIDE, CALCIUM CHLORIDE 600; 310; 30; 20 MG/100ML; MG/100ML; MG/100ML; MG/100ML
INJECTION, SOLUTION INTRAVENOUS CONTINUOUS PRN
Status: DISCONTINUED | OUTPATIENT
Start: 2019-09-25 | End: 2019-09-25

## 2019-09-25 RX ORDER — CARBOPROST TROMETHAMINE 250 UG/ML
250 INJECTION, SOLUTION INTRAMUSCULAR
Status: DISCONTINUED | OUTPATIENT
Start: 2019-09-25 | End: 2019-09-28 | Stop reason: HOSPADM

## 2019-09-25 RX ORDER — LIDOCAINE 40 MG/G
CREAM TOPICAL
Status: DISCONTINUED | OUTPATIENT
Start: 2019-09-25 | End: 2019-09-25

## 2019-09-25 RX ORDER — OXYTOCIN/0.9 % SODIUM CHLORIDE 30/500 ML
PLASTIC BAG, INJECTION (ML) INTRAVENOUS CONTINUOUS PRN
Status: DISCONTINUED | OUTPATIENT
Start: 2019-09-25 | End: 2019-09-25

## 2019-09-25 RX ORDER — ACETAMINOPHEN 325 MG/1
650 TABLET ORAL EVERY 4 HOURS PRN
Status: DISCONTINUED | OUTPATIENT
Start: 2019-09-28 | End: 2019-09-28 | Stop reason: HOSPADM

## 2019-09-25 RX ORDER — AMOXICILLIN 250 MG
2 CAPSULE ORAL 2 TIMES DAILY PRN
Status: DISCONTINUED | OUTPATIENT
Start: 2019-09-25 | End: 2019-09-28 | Stop reason: HOSPADM

## 2019-09-25 RX ORDER — BUPIVACAINE HYDROCHLORIDE 7.5 MG/ML
INJECTION, SOLUTION INTRASPINAL PRN
Status: DISCONTINUED | OUTPATIENT
Start: 2019-09-25 | End: 2019-09-25

## 2019-09-25 RX ORDER — DIPHENHYDRAMINE HYDROCHLORIDE 50 MG/ML
25 INJECTION INTRAMUSCULAR; INTRAVENOUS EVERY 6 HOURS PRN
Status: DISCONTINUED | OUTPATIENT
Start: 2019-09-25 | End: 2019-09-28 | Stop reason: HOSPADM

## 2019-09-25 RX ORDER — ACETAMINOPHEN 325 MG/1
975 TABLET ORAL EVERY 8 HOURS
Status: DISPENSED | OUTPATIENT
Start: 2019-09-25 | End: 2019-09-28

## 2019-09-25 RX ORDER — FENTANYL CITRATE 50 UG/ML
25-50 INJECTION, SOLUTION INTRAMUSCULAR; INTRAVENOUS
Status: DISCONTINUED | OUTPATIENT
Start: 2019-09-25 | End: 2019-09-25

## 2019-09-25 RX ORDER — PHENYLEPHRINE HCL IN 0.9% NACL 1 MG/10 ML
SYRINGE (ML) INTRAVENOUS CONTINUOUS PRN
Status: DISCONTINUED | OUTPATIENT
Start: 2019-09-25 | End: 2019-09-25

## 2019-09-25 RX ORDER — ONDANSETRON 2 MG/ML
4 INJECTION INTRAMUSCULAR; INTRAVENOUS EVERY 6 HOURS PRN
Status: DISCONTINUED | OUTPATIENT
Start: 2019-09-25 | End: 2019-09-28 | Stop reason: HOSPADM

## 2019-09-25 RX ORDER — CITRIC ACID/SODIUM CITRATE 334-500MG
30 SOLUTION, ORAL ORAL
Status: DISCONTINUED | OUTPATIENT
Start: 2019-09-25 | End: 2019-09-25

## 2019-09-25 RX ADMIN — SODIUM CHLORIDE, SODIUM LACTATE, POTASSIUM CHLORIDE, CALCIUM CHLORIDE AND DEXTROSE MONOHYDRATE: 5; 600; 310; 30; 20 INJECTION, SOLUTION INTRAVENOUS at 22:49

## 2019-09-25 RX ADMIN — ONDANSETRON 4 MG: 2 INJECTION INTRAMUSCULAR; INTRAVENOUS at 17:25

## 2019-09-25 RX ADMIN — Medication 50 MCG/MIN: at 10:29

## 2019-09-25 RX ADMIN — KETOROLAC TROMETHAMINE 30 MG: 30 INJECTION, SOLUTION INTRAMUSCULAR; INTRAVENOUS at 17:25

## 2019-09-25 RX ADMIN — KETOROLAC TROMETHAMINE 30 MG: 30 INJECTION, SOLUTION INTRAMUSCULAR at 11:20

## 2019-09-25 RX ADMIN — SODIUM CITRATE AND CITRIC ACID MONOHYDRATE 30 ML: 500; 334 SOLUTION ORAL at 10:09

## 2019-09-25 RX ADMIN — MORPHINE SULFATE 0.15 MG: 1 INJECTION, SOLUTION EPIDURAL; INTRATHECAL; INTRAVENOUS at 10:28

## 2019-09-25 RX ADMIN — SODIUM CHLORIDE, SODIUM LACTATE, POTASSIUM CHLORIDE, CALCIUM CHLORIDE AND DEXTROSE MONOHYDRATE: 5; 600; 310; 30; 20 INJECTION, SOLUTION INTRAVENOUS at 16:03

## 2019-09-25 RX ADMIN — OXYTOCIN-SODIUM CHLORIDE 0.9% IV SOLN 30 UNIT/500ML 300 ML/HR: 30-0.9/5 SOLUTION at 10:51

## 2019-09-25 RX ADMIN — BUPIVACAINE HYDROCHLORIDE IN DEXTROSE 1.7 ML: 7.5 INJECTION, SOLUTION SUBARACHNOID at 10:28

## 2019-09-25 RX ADMIN — ACETAMINOPHEN 975 MG: 325 TABLET, FILM COATED ORAL at 22:06

## 2019-09-25 RX ADMIN — CEFAZOLIN SODIUM 2 G: 2 INJECTION, SOLUTION INTRAVENOUS at 10:29

## 2019-09-25 RX ADMIN — BUPIVACAINE 20 ML: 13.3 INJECTION, SUSPENSION, LIPOSOMAL INFILTRATION at 11:46

## 2019-09-25 RX ADMIN — KETOROLAC TROMETHAMINE 30 MG: 30 INJECTION, SOLUTION INTRAMUSCULAR; INTRAVENOUS at 23:44

## 2019-09-25 RX ADMIN — SODIUM CHLORIDE, POTASSIUM CHLORIDE, SODIUM LACTATE AND CALCIUM CHLORIDE 1000 ML: 600; 310; 30; 20 INJECTION, SOLUTION INTRAVENOUS at 09:56

## 2019-09-25 RX ADMIN — SODIUM CHLORIDE, POTASSIUM CHLORIDE, SODIUM LACTATE AND CALCIUM CHLORIDE: 600; 310; 30; 20 INJECTION, SOLUTION INTRAVENOUS at 09:56

## 2019-09-25 RX ADMIN — BUPIVACAINE HYDROCHLORIDE 20 ML: 2.5 INJECTION, SOLUTION EPIDURAL; INFILTRATION; INTRACAUDAL at 11:46

## 2019-09-25 RX ADMIN — FENTANYL CITRATE 10 MCG: 50 INJECTION, SOLUTION INTRAMUSCULAR; INTRAVENOUS at 10:28

## 2019-09-25 NOTE — DISCHARGE SUMMARY
[facility] Discharge Summary    Laila Cueva MRN# [id number]   Age: 34 year old YOB: 1985     Date of Admission:  2019  Date of Discharge:  2019    Admit Dx:   - Intrauterine pregnancy at 39w5d  - H/o CS x1, failed IOL  - Maternal PKU, Kluven  - GBS positive status  - Failed GCT, passed GTT    Discharge Dx:  - Same as above, s/p procedure below    Procedures:  - Repeat low transverse  section with double layer uterine closure via Pfannenstiel incision  - Spinal analgesia  - TAP block    Admit HPI:  Ms. Laila Cueva is a 34 year old  at 39w5d by LMP c/w 14w4d US, who presents for repeat  section.  She denies contractions, vaginal bleeding, and loss of fluid. Active fetal movement.    Please see her admit H&P for full details of her PMH, PSH, Meds, Allergies and exam on admit.    Operative Course:  Surgery was uncomplicated. EBL from the delivery was 966 ml. Please see her  Section Operative Note for full details regarding her delivery.    Operative Findings:   Liveborn female infant in CODY presentation. Nuchal cord x2. Apgars 8 at 1 minute & 9 at 5 minutes. Weight pending. No cord gases sent. True knot in cord. Normal placenta, intact. Normal uterus and ovaries. Right paratubal cyst. Normal left fallopian tube. Mild rectofascial adhesions. Minimal adhesions of bladder to lower uterine segment. No intraabdominal adhesions.     Postoperative Course:  Her postoperative course was uncomplicated. On POD#3, she was meeting all of her postpartum goals and deemed stable for discharge. She was voiding without difficulty, tolerating a regular diet without nausea and vomiting, her pain was well controlled on oral pain medicines and her lochia was appropriate. Her hemoglobin prior to delivery was 11.1 and after delivery was 10.8. Her Rh status was positive and Rhogam was not indicated.    Discharge Medications:     Review of your  medicines      START taking      Dose / Directions   oxyCODONE 5 MG tablet  Commonly known as:  ROXICODONE      Dose:  5-10 mg  Take 1-2 tablets (5-10 mg) by mouth every 6 hours as needed for severe pain  Quantity:  12 tablet  Refills:  0        CONTINUE these medicines which have NOT CHANGED      Dose / Directions   acetaminophen 325 MG tablet  Commonly known as:  TYLENOL  Used for:  Previous  delivery, antepartum condition or complication      Dose:  650 mg  Take 2 tablets (650 mg) by mouth every 6 hours as needed for mild pain Start after Delivery.  Quantity:  100 tablet  Refills:  0     ibuprofen 600 MG tablet  Commonly known as:  ADVIL/MOTRIN  Used for:  Previous  delivery, antepartum condition or complication      Dose:  600 mg  Take 1 tablet (600 mg) by mouth every 6 hours as needed for moderate pain Start after delivery  Quantity:  60 tablet  Refills:  0     IRON SUPPLEMENT PO      Take by mouth daily  Refills:  0     prenatal multivitamin w/iron 27-0.8 MG tablet  Indication:  Pregnancy      Dose:  1 tablet  Take 1 tablet by mouth daily  Refills:  0     sapropterin dihydrochloride 100 MG solu-tablet  Commonly known as:  KUVAN  Used for:  Phenylketonuria (PKU) (H)      Take 13 tablets by mouth once daily with food.  Quantity:  360 tablet  Refills:  11     senna-docusate 8.6-50 MG tablet  Commonly known as:  SENOKOT-S/PERICOLACE  Used for:  Previous  delivery, antepartum condition or complication      Dose:  1 tablet  Take 1 tablet by mouth daily Start after delivery.  Quantity:  100 tablet  Refills:  0           Where to get your medicines      These medications were sent to [facility] [address]  [address] [address]    Phone:  [phone]     oxyCODONE 5 MG tablet       Discharge/Disposition:  Laila Cueva was discharged to home in stable condition with the following instructions/medications:  1) Call for  temperature > 100.4, bright red vaginal bleeding >1 pad an hour x 2 hours, foul smelling vaginal discharge, pain not controlled by usual oral pain meds, persistent nausea and vomiting not controlled on medications, drainage or redness from incision site  2) She was undecided about contraception.  3) For feeding she decided to breastfeed.  4) She was instructed to follow-up with her primary OB in 6 weeks for a routine postpartum visit.  5) Discharge activity:  No heavy lifting >15 lbs or strenuous activity for 6 weeks, pelvic rest for 6 weeks, no driving or operating machinery while on narcotics.    Garcia Storey MD  OB/GYN PGY-1  09/28/19 8:53 AM

## 2019-09-25 NOTE — OP NOTE
[facility]  Full Operative Progress Note     Surgery Date:     2019     Surgeon:             Eriak De La Paz MD     Assistants:         Santino Kirk MD, PGY-2  Caesar Fisher MS-3     Pre-op Diagnosis:           - Intrauterine pregnancy at 39w5d  - H/o CS x1, failed IOL  - Maternal PKU, Kuvan  - GBS positive status  - Failed GCT, passed GTT     Post-op Diagnosis:         - Same s/p procedure below     Procedure:          Repeat low transverse  section with double layer uterine closure via Pfannenstiel incision     Anesthesia: Spinal  EBL: 966 mL  IVF: 700 mL crystalloid  UOP: 300 mL clear urine at the end of the case  Drains: Braun Catheter      Specimens: None  Complications: None apparent     Findings:   Liveborn female infant in CODY presentation.   Nuchal cord x2. True knot in cord.   Apgars 8 at 1 minute & 9 at 5 minutes.  Weight 3345g.   No cord gases sent.   Normal placenta, intact. Normal uterus and ovaries. Right paratubal cyst. Normal left fallopian tube.   Mild rectofascial adhesions. Minimal adhesions of bladder to lower uterine segment. No intraabdominal adhesions.     Indications:   Laila Cueva is a 34 year old  at 39w5d admitted for scheduled repeat  section. The risks, benefits, and alternatives of  section were discussed with the patient, and she agreed to proceed. Signed consent was obtained.     Procedure Details:   The patient was brought to the OR, where adequate spinal anesthesia was administered. She was placed in the dorsal supine position with a slight leftward tilt. She was prepped and draped in the usual sterile fashion. A surgical time out was performed. A pfannenstiel skin incision was made with the scalpel through her prior incision. This was carried down to the underlying fascia with sharp dissection and cautery. The fascia was incised in the midline, and the incision was extended laterally with the Chin scissors.  The superior aspect of the fascia was grasped with the Kocher clamps and dissected off of the underlying rectus muscles with blunt and sharp dissection. Attention was then turned to the inferior aspect of the fascia, which was similarly dissected off of the underlying rectus muscles. The rectus muscles were  in the midline, and the peritoneum was entered bluntly. The opening was extended with digital pressure and cautery. The bladder blade was placed. The vesicouterine peritoneum was incised in the midline, and the incision was extended laterally with the Metzenbaum scissors. A bladder flap was created digitally and the bladder blade was replaced.     A transverse hysterotomy was made with the scalpel in the lower uterine segment, and the incision was extended with digital pressure. The infant was noted to be in the CODY position, and was delivered atraumatically. The shoulders delivered easily. A double nuchal cord was noted with true knot. The cord was doubly clamped and cut, and the infant was handed off to the awaiting nursery staff. A segment of cord was cut and saved for cord gases if necessary. The placenta was delivered with gentle traction on the umbilical cord and uterine massage. The uterus was exteriorized and cleared of all clots and debris. Uterine tone was noted to be firm with pitocin given through the running IV and uterine massage. The hysterotomy was closed with a running locked suture of 0 Vicryl.  The hysterotomy was then imbricated using an 0 Monocryl suture. The hysterotomy was noted to be hemostatic.     The posterior cul-de-sac was and cleared of all clots and debris. The uterus was returned to the abdomen. The pericolic gutters were cleared of all clots and debris. The hysterotomy was reexamined and noted to be hemostatic. Interceed was placed over the hysterotomy. The fascia and rectus muscles were examined and areas of oozing were controlled with electrocautery. The fascia was  closed with a running 0 Vicryl suture. The subcutaneous tissue was irrigated and areas of oozing were controlled with electrocautery. The subcutaneous tissue was less than 3 cm in thickness, and was therefore not closed. The skin was closed with 4-0 monocryl and steri strips and covered with a sterile dressing.    All sponge, needle, and instrument counts were correct. The patient tolerated the procedure well, and was transferred to recovery in stable condition. Dr. De La Paz was present and scrubbed for the entirety of the procedure.     Santino Kirk MD  OB/GYN Resident, PGY-2  9/25/2019 2:54 PM

## 2019-09-25 NOTE — ANESTHESIA PROCEDURE NOTES
Peripheral Nerve Block Procedure Note    Staff:     Anesthesiologist:  Sandra Bryant MD    Resident/CRNA:  Rosario Worthington MD    Block performed by resident/CRNA in the presence of a teaching physician    Location: PACU  Procedure Start/Stop TImes:     patient identified, IV checked, site marked, risks and benefits discussed, informed consent, monitors and equipment checked, pre-op evaluation, at physician/surgeon's request and post-op pain management      Correct Patient: Yes      Correct Position: Yes      Correct Site: Yes      Correct Procedure: Yes      Correct Laterality:  Yes    Site Marked:  Yes  Procedure details:     Procedure:  TAP    ASA:  2    Diagnosis:  S/p c section    Laterality:  Bilateral    Position:  Supine    Sterile Prep: chloraprep      Local skin infiltration:  None    Needle:  Short bevel    Needle gauge:  21    Needle length (mm):  110    Ultrasound: Yes      Ultrasound used to identify targeted nerve, plexus, or vascular structure and placed a needle adjacent to it      Permanent Image entered into patiient's record      Abnormal pain on injection: No      Blood Aspirated: No      Paresthesias:  No    Bleeding at site: No      Bolus via:  Needle    Infusion Method:  Single Shot    Complications:  None

## 2019-09-25 NOTE — H&P
[facility]  OB History and Physical      Laila Cueva MRN# [id number]   Age: 34 year old YOB: 1985     CC: Repeat  section    HPI:  Ms. Laila Cueva is a 34 year old  at 39w5d by LMP c/w 14w4d US, who presents for repeat  section.  She denies contractions, vaginal bleeding, and loss of fluid. Active fetal movement.    Pregnancy Complications:  - H/o CS x1, failed IOL  - Maternal PKU, Kluven  - GBS positive status  - Failed GCT, passed GTT    Prenatal Labs:   Lab Results   Component Value Date    ABO A 2019    RH Pos 2019    AS Neg 2019    HEPBANG Nonreactive 2019    CHPCRT  2014     Negative   Negative for C. trachomatis rRNA by transcription mediated amplification.   A negative result by transcription mediated amplification does not preclude the   presence of C. trachomatis infection because results are dependent on proper   and adequate collection, absence of inhibitors, and sufficient rRNA to be   detected.      GCPCRT  2014     Negative   Negative for N. gonorrhoeae rRNA by transcription mediated amplification.   A negative result by transcription mediated amplification does not preclude the   presence of N. gonorrhoeae infection because results are dependent on proper   and adequate collection, absence of inhibitors, and sufficient rRNA to be   detected.      TREPAB Negative 2016    HGB 11.1 (L) 2019       GBS Status:   Lab Results   Component Value Date    GBS Positive (A) 2019       OB History  OB History    Para Term  AB Living   2 1 1 0 0 1   SAB TAB Ectopic Multiple Live Births   0 0 0 0 1      # Outcome Date GA Lbr Tarik/2nd Weight Sex Delivery Anes PTL Lv   2 Current            1 Term 17 39w5d  3.969 kg (8 lb 12 oz) M CS-LTranv EPI  JUMA      Name: REJI CUEVA      Apgar1: 8  Apgar5: 9       PMHx:   Past Medical History:   Diagnosis Date     Atypical  PKU (H)     since birth, manage with diet      Raynaud phenomenon     affects fingers and toes     Von Willebrand disease (H)     affects mainly menses     Von Willebrand disease (H)      PSHx:   Past Surgical History:   Procedure Laterality Date     BIOPSY OF SKIN LESION      2 benign nevus removed      SECTION N/A 2017    Procedure:  SECTION;  Surgeon: Ana Maria Israel MD;  Location:  L+D      TOOTH EXTRACTION W/FORCEP       Meds:   Medications Prior to Admission   Medication Sig Dispense Refill Last Dose     Ferrous Sulfate (IRON SUPPLEMENT PO) Take by mouth daily   2019 at 1400     Prenatal Vit-Fe Fumarate-FA (PRENATAL MULTIVITAMIN  PLUS IRON) 27-0.8 MG TABS Take 1 tablet by mouth daily   2019 at 1400     sapropterin dihydrochloride (KUVAN) 100 MG TBSO Take 13 tablets by mouth once daily with food. 360 tablet 11 2019 at 1400     acetaminophen (TYLENOL) 325 MG tablet Take 2 tablets (650 mg) by mouth every 6 hours as needed for mild pain Start after Delivery. 100 tablet 0 More than a month at Unknown time     ibuprofen (ADVIL/MOTRIN) 600 MG tablet Take 1 tablet (600 mg) by mouth every 6 hours as needed for moderate pain Start after delivery 60 tablet 0 More than a month at Unknown time     senna-docusate (SENOKOT-S/PERICOLACE) 8.6-50 MG tablet Take 1 tablet by mouth daily Start after delivery. 100 tablet 0 More than a month at Unknown time     Allergies:    Allergies   Allergen Reactions     Aspartame      Nuts Other (See Comments)     Peanut (Diagnostic) Other (See Comments)      FmHx:   Family History   Problem Relation Age of Onset     Anemia Mother      Myocardial Infarction Father 64        alive     Hypertension Father      Melanoma Maternal Grandmother          from another cancer type     Anemia Maternal Grandmother      SocHx: She denies any tobacco, alcohol, or other drug use during this pregnancy.    ROS:   Complete 10-point ROS negative  except as noted in HPI.    PE:  Vit: No data found.   Gen: Well-appearing, NAD, comfortable   CV: Well perfused  Pulm: Non-labored breathing  Abd: Soft, gravid, non-tender  Ext: Trace LE edema b/l    Cx: Deferred    Pres:  Ceph by Leopolds  EFW:  7.5# by Leopolds  Memb: Intact              FHT: Baseline 145, moderat variability, positive accelerations, absent decelerations   Lambert: 0-1 contractions in 10 minutes      Assessment  Ms. Laila Cueva is a 34 year old , at 39w5d by LMP c/w 14w4d US, who presents for repeat  section. Pregnancy complicated by h/o CS x1 (failed IOL), maternal PKU, GBS positive status, failed GCT/passed GTT.    Plan  #Repeat CS  - Admit to L&D, routine labs ordered  - Anesthesia consult, plan Ancef  - Discussed risks of  section, including but not limited to bleeding, infection, and injury to surrounding structures. Patient agrees to proceed. Signed consent obtained.     #Maternal PKU  - Continue home Kuven  - S/p MFM consult    #FWB  - Reactive NST  - EFW 7.5#    #PNC  - Rh pos, rubella immune  - Failed GCT, passed GTT  - GBS positive  - Placenta posterior    The patient was discussed with Dr. De La Paz who is in agreement with the treatment plan.    Santino Kirk MD  OB/GYN Resident, PGY-2  2019 9:53 AM

## 2019-09-25 NOTE — PLAN OF CARE
Data: Laila Cueva transferred to [facility] via cart from PACU. Baby transferred via Mother's arms   Action: Receiving unit notified of transfer: Yes. Patient and family notified of room change. Report received at 1400. Belongings sent to receiving unit. Accompanied by Registered Nurse. Oriented patient to surroundings. Call light within reach. ID bands double-checked with receiving RN.  Response: Patient tolerated transfer and is stable.

## 2019-09-25 NOTE — ANESTHESIA PREPROCEDURE EVALUATION
Anesthesia Pre-Procedure Evaluation    Patient: Laila Cueva   MRN:     [id number] Gender:   female   Age:    34 year old :      1985        Preoperative Diagnosis: Previous  Section   Procedure(s):  Repeat  Section     Past Medical History:   Diagnosis Date     Atypical PKU (H)     since birth, manage with diet      Raynaud phenomenon     affects fingers and toes     Von Willebrand disease (H)     affects mainly menses     Von Willebrand disease (H)       Past Surgical History:   Procedure Laterality Date     BIOPSY OF SKIN LESION      2 benign nevus removed      SECTION N/A 2017    Procedure:  SECTION;  Surgeon: Ana Maria Israel MD;  Location: [facility] L+D     HC TOOTH EXTRACTION W/FORCEP            Anesthesia Evaluation     . Pt has had prior anesthetic. Type: Regional    No history of anesthetic complications          ROS/MED HX    ENT/Pulmonary:  - neg pulmonary ROS     Neurologic:  - neg neurologic ROS     Cardiovascular:  - neg cardiovascular ROS   (+) ----. : . . . :. . No previous cardiac testing       METS/Exercise Tolerance:     Hematologic: Comments: VW disease likely false positive.  Recent VWF levels within normal limits no abnormal bleeding other than heavy menstrual periods         Musculoskeletal:  - neg musculoskeletal ROS       GI/Hepatic:  - neg GI/hepatic ROS       Renal/Genitourinary:  - ROS Renal section negative       Endo:  - neg endo ROS       Psychiatric:  - neg psychiatric ROS       Infectious Disease:  - neg infectious disease ROS       Malignancy:      - no malignancy   Other: Comment: , now at [id number]. Last pregnancy CS LTransverse in 2017.   Hx/o PKU-diet controlled.                         PHYSICAL EXAM:   Mental Status/Neuro: A/A/O   Airway: Facies: Feasible  Mallampati: I  Mouth/Opening: Full  TM distance: > 6 cm  Neck ROM: Full   Respiratory: Auscultation: CTAB     Resp. Rate: Normal     Resp. Effort: Normal      CV:  "Rhythm: Regular  Rate: Age appropriate  Heart: Normal Sounds  Edema: None   Comments:      Dental: Normal Dentition                LABS:  CBC:   Lab Results   Component Value Date    WBC 7.8 09/23/2019    WBC 8.1 02/05/2019    HGB 11.1 (L) 09/23/2019    HGB 10.8 (L) 09/04/2019    HCT 33.2 (L) 09/23/2019    HCT 38.5 02/05/2019     (L) 09/23/2019     02/05/2019     BMP:   Lab Results   Component Value Date     08/10/2015    POTASSIUM 3.8 08/10/2015    CHLORIDE 107 08/10/2015    CO2 24 08/10/2015    BUN 6 (L) 08/10/2015    CR 0.87 08/10/2015    GLC 84 08/10/2015    GLC 81 05/15/2015     COAGS:   Lab Results   Component Value Date    INR 0.91 01/05/2017    FIBR 392 01/05/2017     POC:   Lab Results   Component Value Date    HCG Negative 07/10/2017     OTHER:   Lab Results   Component Value Date    MIGUE 9.2 08/10/2015        Preop Vitals    BP Readings from Last 3 Encounters:   09/19/19 114/75   09/11/19 109/72   09/04/19 115/75    Pulse Readings from Last 3 Encounters:   09/19/19 88   09/11/19 97   09/04/19 92      Resp Readings from Last 3 Encounters:   01/11/19 16   07/10/17 16   01/08/17 16    SpO2 Readings from Last 3 Encounters:   09/19/19 96%   08/27/19 98%   06/19/19 100%      Temp Readings from Last 1 Encounters:   07/31/19 37.2  C (99  F) (Oral)    Ht Readings from Last 1 Encounters:   09/19/19 1.6 m (5' 3\")      Wt Readings from Last 1 Encounters:   09/19/19 79.2 kg (174 lb 9.6 oz)    Estimated body mass index is 30.93 kg/m  as calculated from the following:    Height as of 9/19/19: 1.6 m (5' 3\").    Weight as of 9/19/19: 79.2 kg (174 lb 9.6 oz).     LDA:        Assessment:   ASA SCORE: 2    H&P: History and physical reviewed and following examination; no interval change.   Smoking Status:  Non-Smoker/Unknown   NPO Status: NPO Appropriate     Plan:   Anes. Type:  Spinal   Pre-Medication: None   Induction:  N/a   Airway: Native Airway   Access/Monitoring: PIV   Maintenance: N/a     Postop " Plan:   Postop Pain: Regional  Postop Sedation/Airway: Not planned  Disposition: Inpatient/Admit     PONV Management:   Adult Risk Factors: Female, Non-Smoker   Prevention: Ondansetron     CONSENT: Direct conversation   Plan and risks discussed with: Patient          Comments for Plan/Consent:   now at 39w5ds, here for repeat C section. Last C section low transverse on .   Patient has Hx/o VW disease and atypical PKU- diet controlled. Allergies to nuts/peanuts, but no reported drug allergies.    labs Hgb 11.1, Plt 141K. T&S done  A pos, no antibodies.                  Rosario Zuluaga MD

## 2019-09-25 NOTE — PLAN OF CARE
Patient is stable following  delivery at 1049 this morning. Caring for and feeding infant independently with partner. VSS; afebrile. Hourly checks completed. Pain managed with toradol. Tylenol held until patient is able to keep liquids down. Continue postpartum support.

## 2019-09-25 NOTE — BRIEF OP NOTE
[facility]   Brief Operative Note     Surgery Date:   2019    Surgeon:    Erika De La Paz MD    Assistants:    Santino Kirk MD, PGY-2  Caesar Fisher, MS-3    Pre-op Diagnosis:    - Intrauterine pregnancy at 39w5d  - H/o CS x1, failed IOL  - Maternal PKU, Kluven  - GBS positive status  - Failed GCT, passed GTT    Post-op Diagnosis:    - Same s/p procedure below    Procedure:    Repeat low transverse  section with double layer uterine closure via Pfannenstiel incision    Anesthesia: Spinal  EBL: 966 mL  IVF: 700 mL crystalloid  UOP: 300 mL clear urine at the end of the case  Drains: Braun Catheter     Specimens: None  Complications: None apparent    Findings:   Liveborn female infant in CODY presentation. Nuchal cord x2. Apgars 8 at 1 minute & 9 at 5 minutes. Weight pending. No cord gases sent. True knot in cord. Normal placenta, intact. Normal uterus and ovaries. Right paratubal cyst. Normal left fallopian tube. Mild rectofascial adhesions. Minimal adhesions of bladder to lower uterine segment. No intraabdominal adhesions.     Disposition: Transferred in stable condition to BRE Kirk MD  OB/GYN Resident, PGY-2  2019 11:38 AM

## 2019-09-25 NOTE — PLAN OF CARE
Data: Laila Cueva transferred to [id number] via cart at 1340. Baby transferred via parent's arms.  Action: Receiving unit notified of transfer: Yes. Patient and family notified of room change. Report given to Sheila TOLEDO at 1350. Belongings sent to receiving unit. Accompanied by Registered Nurse. Oriented patient to surroundings. Call light within reach. ID bands double-checked with receiving RN.  Response: Patient tolerated transfer and is stable.

## 2019-09-25 NOTE — ANESTHESIA CARE TRANSFER NOTE
Patient: Laila Cueva    Procedure(s):  Repeat  Section    Diagnosis: Previous  Section  Diagnosis Additional Information: No value filed.    Anesthesia Type:   Spinal     Note:  Airway :Room Air  Patient transferred to:PACU  Comments: VSS. Breathing spontaneously at a regular rate with adequate tidal volumes and maintaining O2 sats on room air. Denies nausea or pain. No apparent complications from anesthesia.   Bilateral TAP block done in PACU, tolerated well.     Rosario Mcdaniel MD  [facility] Anesthesia Resident  Handoff Report: Identifed the Patient, Identified the Reponsible Provider, Reviewed the pertinent medical history, Discussed the surgical course, Reviewed Intra-OP anesthesia mangement and issues during anesthesia, Set expectations for post-procedure period and Allowed opportunity for questions and acknowledgement of understanding      Vitals: (Last set prior to Anesthesia Care Transfer)    CRNA VITALS  2019 1103 - 2019 1152      2019             NIBP:  105/90    NIBP Mean:  93                Electronically Signed By: Rosario Zuluaga MD  2019  11:52 AM

## 2019-09-25 NOTE — ANESTHESIA PROCEDURE NOTES
Spinal/LP Procedure Note    Spinal Block  Staff:     Anesthesiologist:  Sandra Bryant MD  Location: OR  Procedure Start/Stop Times:     patient identified, IV checked, site marked, risks and benefits discussed, informed consent, monitors and equipment checked, pre-op evaluation, at physician/surgeon's request and post-op pain management      Correct Patient: Yes      Correct Position: Yes      Correct Site: Yes      Correct Procedure: Yes      Correct Laterality:  Yes    Site Marked:  Yes  Procedure:     Procedure:  Intrathecal    ASA:  2    Position:  Sitting    Sterile Prep: chloraprep      Insertion site:  L2-3    Approach:  Midline    Needle Type:  Sara    Needle gauge (G):  22    Local Skin Infiltration:  1% lidocaine    amount (ml):  3    Needle Length (in):  3.5    Introducer used: Yes      Introducer gauge:  20 G    Attempts:  2    Redirects:  1    CSF:  Clear    Paresthesias:  No

## 2019-09-26 LAB — HGB BLD-MCNC: 10.8 G/DL (ref 11.7–15.7)

## 2019-09-26 PROCEDURE — 85018 HEMOGLOBIN: CPT | Performed by: OBSTETRICS & GYNECOLOGY

## 2019-09-26 PROCEDURE — 25800030 ZZH RX IP 258 OP 636: Performed by: STUDENT IN AN ORGANIZED HEALTH CARE EDUCATION/TRAINING PROGRAM

## 2019-09-26 PROCEDURE — 12000001 ZZH R&B MED SURG/OB UMMC

## 2019-09-26 PROCEDURE — 36415 COLL VENOUS BLD VENIPUNCTURE: CPT | Performed by: OBSTETRICS & GYNECOLOGY

## 2019-09-26 PROCEDURE — 25000128 H RX IP 250 OP 636: Performed by: STUDENT IN AN ORGANIZED HEALTH CARE EDUCATION/TRAINING PROGRAM

## 2019-09-26 PROCEDURE — 25000132 ZZH RX MED GY IP 250 OP 250 PS 637: Performed by: STUDENT IN AN ORGANIZED HEALTH CARE EDUCATION/TRAINING PROGRAM

## 2019-09-26 RX ADMIN — IBUPROFEN 800 MG: 800 TABLET ORAL at 18:14

## 2019-09-26 RX ADMIN — SODIUM CHLORIDE, SODIUM LACTATE, POTASSIUM CHLORIDE, CALCIUM CHLORIDE AND DEXTROSE MONOHYDRATE: 5; 600; 310; 30; 20 INJECTION, SOLUTION INTRAVENOUS at 01:13

## 2019-09-26 RX ADMIN — ACETAMINOPHEN 975 MG: 325 TABLET, FILM COATED ORAL at 21:52

## 2019-09-26 RX ADMIN — IBUPROFEN 800 MG: 800 TABLET ORAL at 12:33

## 2019-09-26 RX ADMIN — OXYCODONE HYDROCHLORIDE 5 MG: 5 TABLET ORAL at 18:14

## 2019-09-26 RX ADMIN — OXYCODONE HYDROCHLORIDE 5 MG: 5 TABLET ORAL at 21:53

## 2019-09-26 RX ADMIN — ACETAMINOPHEN 975 MG: 325 TABLET, FILM COATED ORAL at 05:59

## 2019-09-26 RX ADMIN — SENNOSIDES AND DOCUSATE SODIUM 2 TABLET: 8.6; 5 TABLET ORAL at 19:21

## 2019-09-26 RX ADMIN — ACETAMINOPHEN 975 MG: 325 TABLET, FILM COATED ORAL at 14:05

## 2019-09-26 RX ADMIN — SENNOSIDES AND DOCUSATE SODIUM 1 TABLET: 8.6; 5 TABLET ORAL at 09:14

## 2019-09-26 RX ADMIN — OXYCODONE HYDROCHLORIDE 5 MG: 5 TABLET ORAL at 14:54

## 2019-09-26 RX ADMIN — KETOROLAC TROMETHAMINE 30 MG: 30 INJECTION, SOLUTION INTRAMUSCULAR; INTRAVENOUS at 06:30

## 2019-09-26 RX ADMIN — OXYCODONE HYDROCHLORIDE 5 MG: 5 TABLET ORAL at 11:45

## 2019-09-26 RX ADMIN — SODIUM CHLORIDE, POTASSIUM CHLORIDE, SODIUM LACTATE AND CALCIUM CHLORIDE 500 ML: 600; 310; 30; 20 INJECTION, SOLUTION INTRAVENOUS at 00:35

## 2019-09-26 RX ADMIN — OXYCODONE HYDROCHLORIDE 5 MG: 5 TABLET ORAL at 18:16

## 2019-09-26 NOTE — PROGRESS NOTES
Post Partum Progress Note  PPD#1    Subjective:  She is resting comfortably in bed this morning. She complains of . Pain is improving and well controlled on current medication regimen. She is tolerating PO intake. Lochia present and is appropriate.  Young just removed. She has not passed flatus.  She is ambulating without dizziness or difficulty.  She denies headache, changes in vision, nausea/vomiting, chest pain, shortness of breath, RUQ pain, or worsening edema.  Plans to breast feed.    Objective:  Vitals:    19 1830 19 2203 19 0202 19 0500   BP: (!) 120/90 118/73 113/73 112/74   BP Location:    Right arm   Pulse:       Resp: 18 18 18 16   Temp: 97.8  F (36.6  C) 98.3  F (36.8  C) 98  F (36.7  C) 97.9  F (36.6  C)   TempSrc: Oral Oral Oral Oral   SpO2: 99% 96%         General: NAD, resting comfortably  CV: Regular rate, well perfused.   Pulm: Normal respiratory effort.  Abd: Soft, appropriately tender, non-distended. Fundus is firm and below the umbilicus.    Incision: Covered with bandage  Ext: Trace lower extremity edema bilaterally. No calf tenderness.    Assessment/Plan:  Laila Cueva is a 34 year old  female who is POD#1 s/p RLTCS.    - Encourage routine post-operative goals including ambulation and incentive spirometry  - PNC: Rh pos. Rubella immune. No intervention indicated.  - Pain: controlled on oral medications  - Heme: Hgb 11.1>>AM Hgb pending.  If <10 will discharge home with iron.  - GI: continue anti-emetics and stool softeners as needed.  - : s/p young, awaiting void.  - Infant: Stable in room  - Feeding: Plans on breastfeeding.  - BC: Did not discuss  - Maternal PKU: Continue on Kluvan    Discharge to home on POD#2-3      Yanet Beth MD, MD  Obstetrics and Gyncology, PGY-3  2019 , 7:35 AM        Physician Attestation   I, Peyton Zamora MD, personally examined and evaluated this patient.  I discussed the patient with the  resident/fellow and care team, and agree with the assessment and plan of care as documented in the note of 9/25/19.      I personally reviewed vital signs, medications and labs.  Hemoglobin   Date Value Ref Range Status   09/26/2019 10.8 (L) 11.7 - 15.7 g/dL Final   09/23/2019 11.1 (L) 11.7 - 15.7 g/dL Final        Key findings: patient is doing well, still sore.  Breast feeding well. Continue routine PP cares.  Possible discharge tomorrow.   Peyton Zamora MD  Date of Service (when I saw the patient): 09/26/19

## 2019-09-26 NOTE — PLAN OF CARE
VSS at this time. Post-partum assessment WDL. Pt. Received LR bolus at midnight for low urine output and was on LR with D5 at 125ml/hr. Pt. Is now saline locked because good urine output. Pt. Has gotten up and moving around. Pt young removed at 0615- due to void Pt. Bonding well with infant and breastfeeding is going well at this time.

## 2019-09-26 NOTE — ANESTHESIA POSTPROCEDURE EVALUATION
Anesthesia POST Procedure Evaluation    Patient: Laila Cueva   MRN:     [id number] Gender:   female   Age:    34 year old :      1985        Preoperative Diagnosis: Previous  Section   Procedure(s):  Repeat  Section   Postop Comments: No value filed.       Anesthesia Type:  Not documented  Spinal    JZG FV AN POST EVALUATION    Last Anesthesia Record Vitals:  CRNA VITALS  2019 1103 - 2019 1203      2019             NIBP:  105/90    NIBP Mean:  93          Last PACU Vitals:  Vitals Value Taken Time   /74 2019  9:13 AM   Temp 36.6  C (97.8  F) 2019  9:13 AM   Pulse 76 2019  9:13 AM   Resp 16 2019  9:13 AM   SpO2 98 % 2019  9:13 AM   Temp src     NIBP     Pulse     SpO2     Resp     Temp     Ht Rate     Temp 2           Electronically Signed By: Rosario Zuluaga MD, 2019, 1:37 PM

## 2019-09-26 NOTE — PLAN OF CARE
VSS and postpartum assessment WDL. Patient was reporting increasing uterine and incisional pain that she feels is now better managed with oxycodone, ibuprofen, tylenol. Voiding independently; passing gas. PIV removed due to infiltration. Uterus firm, midline; scant lochia rubra. Incision dressing clean, dry, intact. Breastfeeding with minimal assist for deeper latch; encouraged hand expression and spoon feeding after every breastfeed and no longer than 3 hours between feeds due to baby's weight being down 7.2% at 24 hours. Lactation consult released. Bonding well with baby and involved in cares.  present and supportive. Continue with plan of cares.

## 2019-09-26 NOTE — ANESTHESIA POSTPROCEDURE EVALUATION
Anesthesia POST Procedure Evaluation    Patient: Laila Cueva   MRN:     [id number] Gender:   female   Age:    34 year old :      1985        Preoperative Diagnosis: Previous  Section   Procedure(s):  Repeat  Section   Postop Comments: No value filed.       Anesthesia Type:  Not documented  Spinal    JZG FV AN POST EVALUATION    Last Anesthesia Record Vitals:  CRNA VITALS  2019 1103 - 2019 1203      2019             NIBP:  105/90    NIBP Mean:  93          Last PACU Vitals:  Vitals Value Taken Time   /74 2019  9:13 AM   Temp 36.6  C (97.8  F) 2019  9:13 AM   Pulse 76 2019  9:13 AM   Resp 16 2019  9:13 AM   SpO2 98 % 2019  9:13 AM   Temp src     NIBP     Pulse     SpO2     Resp     Temp     Ht Rate     Temp 2           Electronically Signed By: Rosario Zuluaga MD, 2019, 1:37 PM

## 2019-09-27 PROCEDURE — 25000132 ZZH RX MED GY IP 250 OP 250 PS 637: Performed by: STUDENT IN AN ORGANIZED HEALTH CARE EDUCATION/TRAINING PROGRAM

## 2019-09-27 PROCEDURE — 12000001 ZZH R&B MED SURG/OB UMMC

## 2019-09-27 RX ORDER — OXYCODONE HYDROCHLORIDE 5 MG/1
5-10 TABLET ORAL EVERY 6 HOURS PRN
Qty: 12 TABLET | Refills: 0 | Status: SHIPPED | OUTPATIENT
Start: 2019-09-27 | End: 2019-11-07

## 2019-09-27 RX ADMIN — OXYCODONE HYDROCHLORIDE 5 MG: 5 TABLET ORAL at 04:47

## 2019-09-27 RX ADMIN — OXYCODONE HYDROCHLORIDE 5 MG: 5 TABLET ORAL at 20:05

## 2019-09-27 RX ADMIN — ACETAMINOPHEN 975 MG: 325 TABLET, FILM COATED ORAL at 06:31

## 2019-09-27 RX ADMIN — IBUPROFEN 800 MG: 800 TABLET ORAL at 19:06

## 2019-09-27 RX ADMIN — OXYCODONE HYDROCHLORIDE 5 MG: 5 TABLET ORAL at 23:55

## 2019-09-27 RX ADMIN — OXYCODONE HYDROCHLORIDE 10 MG: 5 TABLET ORAL at 07:54

## 2019-09-27 RX ADMIN — OXYCODONE HYDROCHLORIDE 5 MG: 5 TABLET ORAL at 01:30

## 2019-09-27 RX ADMIN — OXYCODONE HYDROCHLORIDE 5 MG: 5 TABLET ORAL at 14:08

## 2019-09-27 RX ADMIN — IBUPROFEN 800 MG: 800 TABLET ORAL at 13:01

## 2019-09-27 RX ADMIN — IBUPROFEN 800 MG: 800 TABLET ORAL at 07:46

## 2019-09-27 RX ADMIN — ACETAMINOPHEN 975 MG: 325 TABLET, FILM COATED ORAL at 14:08

## 2019-09-27 RX ADMIN — OXYCODONE HYDROCHLORIDE 5 MG: 5 TABLET ORAL at 16:54

## 2019-09-27 RX ADMIN — OXYCODONE HYDROCHLORIDE 5 MG: 5 TABLET ORAL at 11:01

## 2019-09-27 RX ADMIN — SENNOSIDES AND DOCUSATE SODIUM 2 TABLET: 8.6; 5 TABLET ORAL at 07:54

## 2019-09-27 RX ADMIN — IBUPROFEN 800 MG: 800 TABLET ORAL at 01:30

## 2019-09-27 RX ADMIN — ACETAMINOPHEN 975 MG: 325 TABLET, FILM COATED ORAL at 21:54

## 2019-09-27 NOTE — LACTATION NOTE
This note was copied from a baby's chart.  Consult for: Second baby, Laila struggled some with supply first time.     History: C/S @ 39w5d, AGA infant @ 7#6 oz. birthweight, 7.2% loss at 24 hours with low intermediate risk serum bilirubin.  Maternal history of phenylketonuria, ?[place]on [place] disease, fibroids, elevated GCT but GTT WNL. Laila  (combo, supplemented with formula for duration starting with 11% weight loss after birth) until her supply ran out after returning to work, then switched fully to formula.     Breast exam of mom: Soft, rounded, pendulous & symmetrical with intact, everted nipples bilaterally. Mom reports early tenderness & bilateral breast growth during pregnancy.     Oral exam of baby: Normal arch to palate, she did bring tongue out beyond gum ridge with encouragement, organized suck on finger.     Feeding assessment:  Demo tips for positioning and breast hold, she latched deep and mom deny pain. Nutritive sucking and swallows mom could see and hear.     Education provided: Discussed positioning & using pillows/blankets for support, anatomy of breast and infant mouth for feedings, ways to get and maintain deep latch (show [employer] video), breast compressions to enhance milk transfer, point out nutritive vs. non-nutritive suck and how to hear swallows, benefits of skin to skin and feeding on cue, supply and demand & importance of early days initiating good supply, benefits of and how to do breast massage & hand expression, talked through hands on pumping. Reviewed how to tell when satiated and if getting enough, what to expect in the coming days and preventing engorgement, breastfeeding log with when and who to call if concerns and breastfeeding resource list adding in [email or web address].     Plan: Frequent skin to skin, breastfeed on cue with goal of 8 to 12 feedings in 24 hours, watch for early cues. Hand express after each feeding until milk is in and hands on pumping 2-4 times in 24  hours to help minimize baby's weight loss and maximize mom's supply. Follow up with outpatient lactation consultant @ [facility] within a week of discharge for support with feedings prn and maximizing supply.

## 2019-09-27 NOTE — PLAN OF CARE
VSS. Postpartum assessment WNL. C/o incisional pain controlled with use of oxycodone, tylenol, and ibuprofen. Breastfeeding with minimal assistance. Encouraged to supplement with maternal expressed breastmilk with each feed. Brought in breast pump per pt request but pt has not yet pumped. Pt states she has a breast pump at home, encouraged pt to check with insurance coverage for a new pump with this baby. Encouraged pt to complete birth certificate and EDS and to watch educational videos. Pt showered this evening. Spouse present and attentive.

## 2019-09-27 NOTE — PROGRESS NOTES
Post Partum Progress Note  PPD#2    Subjective:  She is resting comfortably in bed this morning. She complains of . Pain is improving and well controlled on current medication regimen, just having some soreness on the right side of her incision. She is tolerating PO intake. Lochia present and is appropriate.  Voiding spontaneously. She has passed flatus, but no BM yet.  She is ambulating without dizziness or difficulty.  She denies headache, changes in vision, nausea/vomiting, chest pain, shortness of breath, RUQ pain, or worsening edema.  Plans to breast feed.    Objective:  Vitals:    19 0500 19 0913 19 1820 19 0127   BP: 112/74 100/74 115/69 118/78   BP Location: Right arm      Pulse:  76  86   Resp: 16 16 16 16   Temp: 97.9  F (36.6  C) 97.8  F (36.6  C) 97.6  F (36.4  C) 98.3  F (36.8  C)   TempSrc: Oral Oral Oral Oral   SpO2:  98%         General: NAD, resting comfortably  CV: Regular rate, well perfused.   Pulm: Normal respiratory effort.  Abd: Soft, appropriately tender, non-distended. Fundus is firm and below the umbilicus.    Incision: Covered with bandage  Ext: Trace lower extremity edema bilaterally. No calf tenderness.    Assessment/Plan:  Laila Cueva is a 34 year old  female who is POD#2 s/p RLTCS.    - Encourage routine post-operative goals including ambulation and incentive spirometry  - PNC: Rh pos. Rubella immune. No intervention indicated.  - Pain: controlled on oral medications  - Heme: Hgb 11.1>>AM Hgb pending.  If <10 will discharge home with iron.  - GI: continue anti-emetics and stool softeners as needed.  - : s/p young, awaiting void.  - Infant: Stable in room  - Feeding: Plans on breastfeeding.  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      Yanet Beth MD, MD  Obstetrics and Gyncology, PGY-3    Attestation:   This patient was seen and evaluated by me, separately from the house  staff team. I have reviewed the note/plan above and agree.     Doing well but worried about the baby. Discussed if baby can go she can go as well, but will wait for peds rounding. Discontinue IV today and shower, remove bandage. Is taking oxy so script for #12 tablets was done and discussed this with the patient. Discharge instructions reviewed.    Ana Maria Israel MD

## 2019-09-27 NOTE — PLAN OF CARE
VSS. Fundus firm midline and at U/1. Incision open to air and intact with no signs of infection. Incisional pain controlled with tylenol, ibuprofen and 5 mg of oxycodone. Breastfeeding independently with encouragement from staff on obtaining deeper latch. Hand expressed x1. Voiding adequately and tolerating regular diet. Bonding well with baby. Continue current plan of care.

## 2019-09-27 NOTE — PLAN OF CARE
VSS and postpartum assessment WDL. Incisional pain managed with ibuprofen, acetaminophen, oxycodone. Incision well-approximated and no signs of infection. Uterus firm, midline; scant lochia rubra. Breastfeeding with minimal assist for deeper latch. Handexpressing after every feed. Bonding well with baby and involved in cares.  present and supportive. Continue with plan of cares.

## 2019-09-28 ENCOUNTER — HOME INFUSION (PRE-WILLOW HOME INFUSION) (OUTPATIENT)
Dept: PHARMACY | Facility: CLINIC | Age: 34
End: 2019-09-28

## 2019-09-28 VITALS
SYSTOLIC BLOOD PRESSURE: 122 MMHG | OXYGEN SATURATION: 98 % | HEART RATE: 75 BPM | DIASTOLIC BLOOD PRESSURE: 81 MMHG | RESPIRATION RATE: 16 BRPM | TEMPERATURE: 97.9 F

## 2019-09-28 PROCEDURE — 25000132 ZZH RX MED GY IP 250 OP 250 PS 637: Performed by: STUDENT IN AN ORGANIZED HEALTH CARE EDUCATION/TRAINING PROGRAM

## 2019-09-28 RX ADMIN — OXYCODONE HYDROCHLORIDE 5 MG: 5 TABLET ORAL at 16:19

## 2019-09-28 RX ADMIN — OXYCODONE HYDROCHLORIDE 5 MG: 5 TABLET ORAL at 13:19

## 2019-09-28 RX ADMIN — ACETAMINOPHEN 975 MG: 325 TABLET, FILM COATED ORAL at 06:31

## 2019-09-28 RX ADMIN — IBUPROFEN 800 MG: 800 TABLET ORAL at 06:30

## 2019-09-28 RX ADMIN — IBUPROFEN 800 MG: 800 TABLET ORAL at 00:48

## 2019-09-28 RX ADMIN — ACETAMINOPHEN 650 MG: 325 TABLET, FILM COATED ORAL at 13:19

## 2019-09-28 RX ADMIN — OXYCODONE HYDROCHLORIDE 5 MG: 5 TABLET ORAL at 02:55

## 2019-09-28 RX ADMIN — OXYCODONE HYDROCHLORIDE 5 MG: 5 TABLET ORAL at 06:31

## 2019-09-28 RX ADMIN — IBUPROFEN 800 MG: 800 TABLET ORAL at 16:19

## 2019-09-28 NOTE — PLAN OF CARE
Vss, postpartum assessment WDL. Patient taking tylenol, ibuprofen, and oxycodone for pain control. Breast feeding infant independently and also doing hand expression and using breast pump. Discharge and home med instructions discussed with patient, all questions answered. Patient knows to make a 6 week follow up appt with her provider. Patient will be discharged home this evening.

## 2019-09-28 NOTE — PLAN OF CARE
Home care referral placed through [facility] for early maternal discharge and infant weight loss of 12%.

## 2019-09-28 NOTE — LACTATION NOTE
This note was copied from a baby's chart.  Follow up visit on day of discharge. Infant weight loss this morning 11.8% down 70g from yesterday. Baby has been feeding every 2 to 3 hours and mom hand expressing frequently giving 3-5 mL after most feedings. Mom reports Laura falls asleep after 10-15 minutes so she pulls her off right side, baby has been refusing to feed on left side overnight. Breasts are firmer and ricks today, milk coming in.    Worked with mom on left side latch using football hold (since cross cradle working best on right). Demo pillows and positioning for football and when Laura tries pulling away show mom how to use breast massage and compressions to enhance milk transfer, reminders to keep deep latch. Infant settled in to feeding on left, gulping swallows initially and fed well for about 15 minutes. She pulled off contented and fell asleep, nipple full & rounded after. Encouraged to offer both breasts at each feeding & use breast compressions prn during to maximize milk transfer, feed on cue but no longer than 3 hours between and limit to 30 minutes at breast for now until gaining weight again. Reviewed how to do hands on pumping, encourage pump after most feedings (endorse to hand express instead once or twice/day prn fatigue) and give supplement every time, continue to feed back expressed milk until follow up & weights stabilized.

## 2019-09-28 NOTE — PLAN OF CARE
Data: Vital signs within normal limits. Postpartum checks within normal limits - see flow record. Patient eating and drinking normally. Patient able to empty bladder independently and is up ambulating. No apparent signs of infection. Incision healing well. Patient performing self cares and is able to care for infant.  Action: Patient medicated during the shift for pain and cramping. See MAR. Patient reassessed within 1 hour after each medication and pain was improved - patient stated she was comfortable. Patient education done about pain control, hand expression. See flow record.  Response: Positive attachment behaviors observed with infant. Support persons are present.   Plan: Anticipate discharge on Saturday.

## 2019-09-28 NOTE — PLAN OF CARE
Data: Vital signs within normal limits. Postpartum checks within normal limits - see flow record. Patient eating and drinking normally. Patient able to empty bladder independently and is up ambulating. No apparent signs of infection. Incision healing well. Patient performing self cares and is able to care for infant.  Action: Patient medicated during the shift for pain. See MAR. Patient reassessed within 1 hour after each medication and pain was improved - patient stated she was comfortable. Patient education done about engorgement, finger feeding,  cares, and incision cares. See flow record.  Response: Positive attachment behaviors observed with infant. Support person present.   Plan: Will continue plan of care.

## 2019-09-28 NOTE — PLAN OF CARE
1930-2130: Patient stable. Pain well-controlled with Tylenol, Ibuprofen and Oxy 5mg. Stool softener held since patient reported loose/soft stool. No concerns at this time. Continue plan of care.

## 2019-09-28 NOTE — PROGRESS NOTES
Post Partum Progress Note    Subjective:  Patient is doing well.  No complaints. Minimal lochia.  Pain well controlled on pain meds.  Tolerating PO and ambulating without any issues.  Voiding spontaneously. Had a bowel movement. Denies any fever, chills, SOB, chest pain, N/V,  headache, dizziness.  Planning on breastfeeding. Undecided about birth control.    Objective:  Patient Vitals for the past 24 hrs:   BP Temp Temp src Pulse Heart Rate Resp   19 0600 124/79 98.1  F (36.7  C) Oral -- 76 14   19 0100 114/71 98.3  F (36.8  C) Oral -- 73 16   19 1658 120/81 98.1  F (36.7  C) Oral 75 -- 16       General: AAOx3, NAD, appears generally well  Resp:  Breathing comfortably on room air  Cv: well perfused  Abd:  Soft, nontender, nondistended, fundus firm below the umbilicus  Incision: clean/dry/intact, no erythema or drainage  Ext:  no edema in bilateral LE      Assessment and Plan:  34 year old old  POD#3 s/p RLTCS. She is doing well and ready to go home    # Postpartum Care  - Pain: Tylenol, ibuprofen, oxycodone PRN.  - Heme: 11.1 > QBL 966mL> 10.8  - GI: Regular diet. Bowel regimen. Antiemetics PRN.  - : voiding spontaneously  - Rh positive, Rubella immune  - breastfeeding  - Undecided about birth control  - Maternal PKU: Continue on Kluvan    Anticipate discharge to home today    Garcia Storey MD  OB/GYN PGY-1  19 8:06 AM    Patient seen and examined by me, agree with above resident note. Overall doing well, stable for discharge.  Baby has still lost weight, so may have to stay, but peds plans to see how the day goes.  discharge rx given.  Instructions given.  RTC 6 weeks    SANTOS ABBASI MD

## 2019-09-30 ENCOUNTER — TELEPHONE (OUTPATIENT)
Dept: PEDIATRICS | Facility: CLINIC | Age: 34
End: 2019-09-30

## 2019-09-30 DIAGNOSIS — R21 RASH: Primary | ICD-10-CM

## 2019-09-30 RX ORDER — MUPIROCIN 20 MG/G
OINTMENT TOPICAL 3 TIMES DAILY
Qty: 15 G | Refills: 0 | Status: SHIPPED | OUTPATIENT
Start: 2019-09-30 | End: 2019-11-07

## 2019-09-30 RX ORDER — MUPIROCIN 20 MG/G
OINTMENT TOPICAL 3 TIMES DAILY
Qty: 15 G | Refills: 0 | Status: SHIPPED | OUTPATIENT
Start: 2019-09-30 | End: 2020-01-02

## 2019-10-03 NOTE — PROGRESS NOTES
This is a recent snapshot of the patient's [facility] medical record.  For current drug dose and complete information and questions, call [phone] or In Basket pool, fv home infusion ([id number])  CSN Number:  [id number]

## 2019-10-03 NOTE — PROGRESS NOTES
This is a recent snapshot of the patient's [facility] medical record.  For current drug dose and complete information and questions, call [phone] or In Basket pool, fv home infusion ([id number])  CSN Number:  [id number]

## 2019-10-05 ENCOUNTER — HEALTH MAINTENANCE LETTER (OUTPATIENT)
Age: 34
End: 2019-10-05

## 2019-11-07 ENCOUNTER — PRENATAL OFFICE VISIT (OUTPATIENT)
Dept: OBGYN | Facility: CLINIC | Age: 34
End: 2019-11-07
Payer: COMMERCIAL

## 2019-11-07 VITALS — WEIGHT: 152.1 LBS | BODY MASS INDEX: 26.94 KG/M2 | DIASTOLIC BLOOD PRESSURE: 72 MMHG | SYSTOLIC BLOOD PRESSURE: 120 MMHG

## 2019-11-07 PROBLEM — O34.219 PREVIOUS CESAREAN DELIVERY, ANTEPARTUM CONDITION OR COMPLICATION: Status: RESOLVED | Noted: 2017-01-06 | Resolved: 2019-11-07

## 2019-11-07 PROBLEM — Z23 NEED FOR TDAP VACCINATION: Status: RESOLVED | Noted: 2019-02-05 | Resolved: 2019-11-07

## 2019-11-07 PROCEDURE — 99207 ZZC POST PARTUM EXAM: CPT | Performed by: OBSTETRICS & GYNECOLOGY

## 2019-11-07 ASSESSMENT — PATIENT HEALTH QUESTIONNAIRE - PHQ9: SUM OF ALL RESPONSES TO PHQ QUESTIONS 1-9: 3

## 2019-11-07 NOTE — PROGRESS NOTES
Laila is here for a 6-week postpartum checkup.    She had a repeat c/s of a viable girl, weight 7 pounds 6 oz., with no complications.  Since delivery, she has been breast feeding.  She has no signs of infection, bleeding or other complications.  She is not pregnant.  We discussed contraceptions and she has chosen Mirena IUD.      EXAM:    HEENT: grossly normal.  NECK: no lymphadenopathy or thyroidomegaly.  LUNGS: CTA X 2, no rales or crackles.  BACK: No spinal or CVA tenderness.  HEART: RRR without murmurs clicks or gallops.  ABDOMEN: soft, non tender, good bowel sounds, without masses rebound, guarding or tenderness. Incision well healed.    PELVIC:    deferred    EXTREMITIES: Warm to touch, good pulses, no ankle edema or calf tenderness.  NEUROLOGIC: grossly normal.    ASSESSMENT:   Normal 6-week postpartum exam after repeat c/s.    PLAN:  Pap smear due 2020.  Will RTC iin 2 weeks for IUD placement.  Ok to resume normal activities.    SANTOS ABBASI MD

## 2019-11-07 NOTE — NURSING NOTE
"Chief Complaint   Patient presents with     Post Partum Exam       Initial /72   Wt 69 kg (152 lb 1.6 oz)   LMP 2018 (Approximate)   Breastfeeding? Yes   BMI 26.94 kg/m   Estimated body mass index is 26.94 kg/m  as calculated from the following:    Height as of 19: 1.6 m (5' 3\").    Weight as of this encounter: 69 kg (152 lb 1.6 oz).  BP completed using cuff size: regular    Questioned patient about current smoking habits.  Pt. has never smoked.          The following HM Due: pap smear      The following patient reported/Care Every where data was sent to:  P ABSTRACT QUALITY INITIATIVES [[id number]]        patient has appointment for today  Rebecca Moulton                "

## 2019-12-06 ENCOUNTER — DOCUMENTATION ONLY (OUTPATIENT)
Dept: PEDIATRICS | Facility: CLINIC | Age: 34
End: 2019-12-06

## 2019-12-06 NOTE — PROGRESS NOTES
EPDS was done in clinic today during child's 2 month visit and score was 11 with negative psychosis and negative screen for suicidal ideation, though has had passive thoughts of self-injury.   Laila has no previous history of depression.   Copy of EPDS was given to Laila today as well as educational material about postpartum depression.     Plan:   Follow up this week with OB/primary care provider was recommended.  Laila is leaving town today to go her cabin with family and feels this will be restorative for her.  Would like to follow-up with OB next week.      Behavioral health referral was not placed, per mother's request.  She would like to follow-up with OB provider next week prior to seeing behavioral health.  Requests to have OB provider office call her to schedule an appointment.      Bharti Bocanegra MD

## 2019-12-06 NOTE — PROGRESS NOTES
Patient delivered on 9/25/19. Pt was seen on 11/7/19 for her post-partum appointment. Do you want to see patient in clinic next week or do you want her to see family practice.     PHQ-9 SCORE 2/17/2017 9/4/2019 11/7/2019   PHQ-9 Total Score 4 6 3     Monae Mcdonnell, RN-BSN

## 2019-12-06 NOTE — PROGRESS NOTES
I agree she should probably be seen when she returns from her trip.  I am happy to see her, if scheduling does not work out well, she could also see her PCP, whichever is her preference.  Thanks    SANTOS ABBASI MD

## 2019-12-06 NOTE — PROGRESS NOTES
TC to patient. Discussed appointment when she returns from trip. Scheduled to see CATA on 12/10.   Monae Mcdonnell RN-BSN

## 2019-12-10 ENCOUNTER — OFFICE VISIT (OUTPATIENT)
Dept: OBGYN | Facility: CLINIC | Age: 34
End: 2019-12-10
Payer: COMMERCIAL

## 2019-12-10 VITALS
DIASTOLIC BLOOD PRESSURE: 82 MMHG | WEIGHT: 150.5 LBS | SYSTOLIC BLOOD PRESSURE: 132 MMHG | HEART RATE: 93 BPM | BODY MASS INDEX: 26.66 KG/M2

## 2019-12-10 PROCEDURE — 99215 OFFICE O/P EST HI 40 MIN: CPT | Performed by: OBSTETRICS & GYNECOLOGY

## 2019-12-10 ASSESSMENT — PATIENT HEALTH QUESTIONNAIRE - PHQ9: SUM OF ALL RESPONSES TO PHQ QUESTIONS 1-9: 9

## 2019-12-10 NOTE — PROGRESS NOTES
S: Laila Cueva is a 34 year old  who is about 10 wks postpartum.  She presents today with concerns for pp depression.  She states that she is crying easily, not sleeping well (mostly due to baby not sleeping), and worries she is not fulfilling the necessary daily needs to her house and kids.  She will let the baby cry, without neglecting, just to finish what she may be doing, and then feels guilty.  The baby will not take a bottle, so she feels she has no time off and is unable to get away at all.  She doesn't feel supported by her  and family and often feels guilty because she can't muster the energy to clean the house, etc.  She denies feelings of wanting to hurt herself or others and does feel she is capable of safely caring for her children.    PHQ-9 SCORE 12/10/2019   PHQ-9 Total Score 9     O: /82   Pulse 93   Wt 68.3 kg (150 lb 8 oz)   Breastfeeding Yes   BMI 26.66 kg/m      Gen: tearful, but NAD    No further exam done    A/P: pp depression   We discussed that much of what she is feeling an experiencing can be normal in this time.  We discussed ways to approach her  and family about helping more.  We also discussed the likelihood that depression is also playing a role.  We discussed options of therapy, meds or combination of both.  We discussed the pros and cons of both.  For now, she would like to avoid medication, but does feel that therapy would be beneficial.  Referral to [facility] offered, but she prefers to find therapist on her own.  We discussed s/sx of worsening depression and when to call or come in.  She will touch base with me after the holidays to see how things are after returning to work etc.    SANTOS ABBASI MD      This visit lasted approximately 40 minutes and >50% of the time was spent on counseling about pp depression.

## 2019-12-10 NOTE — NURSING NOTE
"Chief Complaint   Patient presents with     Consult     discuss post partum depression       Initial /82   Pulse 93   Wt 68.3 kg (150 lb 8 oz)   Breastfeeding Yes   BMI 26.66 kg/m   Estimated body mass index is 26.66 kg/m  as calculated from the following:    Height as of 19: 1.6 m (5' 3\").    Weight as of this encounter: 68.3 kg (150 lb 8 oz).  BP completed using cuff size: regular    Questioned patient about current smoking habits.  Pt. has never smoked.          The following HM Due: NONE      The following patient reported/Care Every where data was sent to:  P ABSTRACT QUALITY INITIATIVES [[id number]]        patient has appointment for today  Rebecca Moulton                "

## 2019-12-22 NOTE — OR NURSING
Data: Pt to OB PACU at 1135 via cart. PIV infusing without complications, young with clear,yellow urine to gravity, pt denies any pain, denies any nausea and vomiting.  Interventions: IV to pump, monitors and alarms on, SCD on.  Response: stable.  Plan: Patient instructed to notify RN for pain or nausea, routine post op cares, initiate breastfeeding/pumping as soon as patient/infant able.  
No

## 2020-01-01 ENCOUNTER — NURSE TRIAGE (OUTPATIENT)
Dept: NURSING | Facility: CLINIC | Age: 35
End: 2020-01-01

## 2020-01-02 ENCOUNTER — OFFICE VISIT (OUTPATIENT)
Dept: URGENT CARE | Facility: URGENT CARE | Age: 35
End: 2020-01-02
Payer: COMMERCIAL

## 2020-01-02 VITALS
DIASTOLIC BLOOD PRESSURE: 64 MMHG | HEART RATE: 93 BPM | BODY MASS INDEX: 26.57 KG/M2 | WEIGHT: 150 LBS | TEMPERATURE: 98.7 F | RESPIRATION RATE: 16 BRPM | SYSTOLIC BLOOD PRESSURE: 98 MMHG | OXYGEN SATURATION: 97 %

## 2020-01-02 DIAGNOSIS — J06.9 VIRAL URI: Primary | ICD-10-CM

## 2020-01-02 DIAGNOSIS — Z20.828 EXPOSURE TO INFLUENZA: ICD-10-CM

## 2020-01-02 LAB
FLUAV+FLUBV AG SPEC QL: NEGATIVE
FLUAV+FLUBV AG SPEC QL: NEGATIVE
SPECIMEN SOURCE: NORMAL

## 2020-01-02 PROCEDURE — 87804 INFLUENZA ASSAY W/OPTIC: CPT | Performed by: PHYSICIAN ASSISTANT

## 2020-01-02 PROCEDURE — 99213 OFFICE O/P EST LOW 20 MIN: CPT | Performed by: PHYSICIAN ASSISTANT

## 2020-01-02 NOTE — PATIENT INSTRUCTIONS

## 2020-01-02 NOTE — PROGRESS NOTES
SUBJECTIVE:    Laila Cueva presents to clinic today for evaluation of nasal congestion, clear rhinorrhea, Possible low grade fever days one and two of illness (subjective), malaise, generalized waxing and waning body aches and a mild dry, non-productive cough x 2 days duration.    Patient is requesting Influenza screening/has infant child and had possible non-household influenza exposure.     ROS:     HEENT: Positive nasal congestion with clear rhinorrhea.   RESP: Positive cough as per above. Despite cough, denies any severe SOB.  Denies any hx of asthma or RAD.   GI: Denies any N/V/D. No abdominal pain. Normal BM's  SKIN: Denies rash  NEURO: Positive waxing and waning HA as per above. No HA now.  Denies any severe headaches, neck stiffness, photophobia, rash, high fever, mental status changes or lethargy.     Past Medical History:   Diagnosis Date     Atypical PKU (H)     since birth, manage with diet      Raynaud phenomenon     affects fingers and toes     Von Willebrand disease (H)     affects mainly menses     Von Willebrand disease (H)        Current Outpatient Medications   Medication     Ferrous Sulfate (IRON SUPPLEMENT PO)     Prenatal Vit-Fe Fumarate-FA (PRENATAL MULTIVITAMIN  PLUS IRON) 27-0.8 MG TABS     sapropterin dihydrochloride (KUVAN) 100 MG TBSO     No current facility-administered medications for this visit.        Allergies   Allergen Reactions     Aspartame      Nuts Other (See Comments)     Peanut (Diagnostic) Other (See Comments)       OBJECTIVE:  BP 98/64   Pulse 93   Temp 98.7  F (37.1  C) (Tympanic)   Resp 16   Wt 68 kg (150 lb)   SpO2 97%   BMI 26.57 kg/m      Last dose of Tylenol 16 hours ago.       General appearance: alert and no apparent distress  Skin color is pink and without rash.  HEENT:   Conjunctiva without infection.  Sclera clear.  Left TM is normal: no effusions, no erythema, and normal landmarks.  Right TM is normal: no effusions, no erythema, and normal  "landmarks.  Nasal mucosa is congested   Oropharyngeal exam is normal: no lesions, erythema, adenopathy or exudate.  Neck is supple, FROM. No pain or stiffness with ROM. No adenopathy  CARDIAC:NORMAL - regular rate and rhythm without murmur.  RESP: Normal - CTA without rales, rhonchi, or wheezing.  NEURO: Alert and oriented.  Normal speech and mentation.  CN II/XII grossly intact.  Gait within normal limits.        Results for orders placed or performed in visit on 01/02/20   Influenza A/B antigen     Status: None   Result Value Ref Range    Influenza A/B Agn Specimen Nasal     Influenza A Negative NEG^Negative    Influenza B Negative NEG^Negative       ASSESSMENT/PLAN:      (R69) Influenza-like illness  (primary encounter diagnosis)  MDM:  No influenza test today, by patient choice, due to fact it will not influence treatment plan. No co-morbid lung disease. No co-morbid immunocompromising disease. No immunocompromising medications. No evidence of respiratory distress. Very reassuring exam.   Plan: oseltamivir (TAMIFLU) 75 MG capsule      Offered and accepted rx for Tamiflu.     Educated both verbally and by way of printed educational material about the typical course of Influenza illness, about how to watch for red flag signs and symptoms and about how to watch for potential for secondary bacterial infections.     Follow-up if any increased fever, difficulty breathing, extreme weakness or lethargy, if symptoms worsen or if any new symptoms develop.      In addition to the above, influenza\"red flag\" signs and sxs are reviewed with pt both verbally and by way of printed educational material for home review.  Pt verbalizes understanding of and agrees to the above plan.       Results for orders placed or performed in visit on 01/02/20   Influenza A/B antigen     Status: None   Result Value Ref Range    Influenza A/B Agn Specimen Nasal     Influenza A Negative NEG^Negative    Influenza B Negative NEG^Negative " "    ASSESSMENT/PLAN:    (J06.9) Viral URI  (primary encounter diagnosis)      MDM: Acute onset viral URI sxs. RST negative. No evidence of secondary bacterial infection on exam. No hx of immunocompromising conditions, respiratory conditions or other chronic medical conditions to warrant abx prophylaxis. Very reassuring exam today.     Plan: Plan: Follow-up with PCP if sxs change, worsen or fail to resolve with home comfort care measures over the next 5-7 days.  In addition to the above, viral URI \"red flag\" signs and sxs are reviewed with patient both verbally and by way of printed educational material for home review.  Patient verbalizes understanding of and agrees to the above plan.       (Z20.028) Exposure to influenza  Plan: Influenza A/B antigen            "

## 2020-01-02 NOTE — TELEPHONE ENCOUNTER
Reason for Disposition    [1] Patient is NOT HIGH RISK AND [2] strongly requests antiviral medicine AND [3] flu symptoms present < 48 hours    Additional Information    Negative: Severe difficulty breathing (e.g., struggling for each breath, speaks in single words)    Negative: Bluish (or gray) lips or face now    Negative: Shock suspected (e.g., cold/pale/clammy skin, too weak to stand, low BP, rapid pulse)    Negative: Sounds like a life-threatening emergency to the triager    Negative: Chest pain  (Exception: MILD central chest pain, present only when coughing)    Negative: [1] Headache AND [2] stiff neck (can't touch chin to chest)    Negative: Fever present > 3 days (72 hours)    Negative: [1] Fever returns after gone for over 24 hours AND [2] symptoms worse or not improved    Negative: [1] Using nasal washes and pain medicine > 24 hours AND [2] sinus pain (around cheekbone or eye) persists    Negative: Earache    Negative: Patient is HIGH RISK (e.g., age > 64 years, pregnant, HIV+, or chronic medical condition)    Negative: [1] Fever > 101 F (38.3 C) AND [2] age > 60    Negative: [1] Fever > 100.0 F (37.8 C) AND [2] bedridden (e.g., nursing home patient, CVA, chronic illness, recovering from surgery)    Negative: [1] Fever > 100.0 F (37.8 C) AND [2] diabetes mellitus or weak immune system (e.g., HIV positive, cancer chemo, splenectomy, chronic steroids)    Negative: Fever > 104 F (40 C)    Negative: [1] Difficulty breathing AND [2] not severe AND [3] not from stuffy nose (e.g., not relieved by cleaning out the nose)    Negative: Patient sounds very sick or weak to the triager    Negative: [1] Sounds like a cold AND [2] no fever    Negative: [1] Cough with sputum AND [2] no fever    Negative: [1] Dry cough (no sputum) sputum AND [2] no fever    Negative: [1] Throat pain AND [2] no fever    Negative: Influenza vaccine reaction is suspected    Answer Assessment - Initial Assessment Questions  1. WORST  "SYMPTOM: \"What is your worst symptom?\" (e.g., cough, runny nose, muscle aches, headache, sore throat, fever)       Muscle aches and coughs  2. ONSET: \"When did your flu symptoms start?\"       Last night  3. COUGH: \"How bad is the cough?\"        Not too bas  4. RESPIRATORY DISTRESS: \"Describe your breathing.\"       no  5. FEVER: \"Do you have a fever?\" If so, ask: \"What is your temperature, how was it measured, and when did it start?\"      99  6. EXPOSURE: \"Were you exposed to someone with influenza?\"        Friends influenza B  7. FLU VACCINE: \"Did you get a flu shot this year?\"      yes  8. HIGH RISK DISEASE: \"Do you any chronic medical problems?\" (e.g., heart or lung disease, asthma, weak immune system, or other HIGH RISK conditions)      no  9. PREGNANCY: \"Is there any chance you are pregnant?\" \"When was your last menstrual period?\"      breastfeeding  10. OTHER SYMPTOMS: \"Do you have any other symptoms?\"  (e.g., runny nose, muscle aches, headache, sore throat)        Muscle aches    Protocols used: INFLUENZA - SEASONAL-A-AH      "

## 2020-01-11 ENCOUNTER — ANCILLARY PROCEDURE (OUTPATIENT)
Dept: GENERAL RADIOLOGY | Facility: CLINIC | Age: 35
End: 2020-01-11
Attending: PHYSICIAN ASSISTANT
Payer: COMMERCIAL

## 2020-01-11 ENCOUNTER — OFFICE VISIT (OUTPATIENT)
Dept: URGENT CARE | Facility: URGENT CARE | Age: 35
End: 2020-01-11
Payer: COMMERCIAL

## 2020-01-11 VITALS
TEMPERATURE: 97.6 F | SYSTOLIC BLOOD PRESSURE: 127 MMHG | BODY MASS INDEX: 26.84 KG/M2 | HEART RATE: 56 BPM | WEIGHT: 151.5 LBS | DIASTOLIC BLOOD PRESSURE: 74 MMHG | OXYGEN SATURATION: 98 %

## 2020-01-11 DIAGNOSIS — J20.9 ACUTE BRONCHITIS, UNSPECIFIED ORGANISM: Primary | ICD-10-CM

## 2020-01-11 DIAGNOSIS — J20.9 ACUTE BRONCHITIS, UNSPECIFIED ORGANISM: ICD-10-CM

## 2020-01-11 PROCEDURE — 71046 X-RAY EXAM CHEST 2 VIEWS: CPT

## 2020-01-11 PROCEDURE — 99214 OFFICE O/P EST MOD 30 MIN: CPT | Performed by: PHYSICIAN ASSISTANT

## 2020-01-11 RX ORDER — ALBUTEROL SULFATE 90 UG/1
2-4 AEROSOL, METERED RESPIRATORY (INHALATION) EVERY 4 HOURS PRN
Qty: 18 G | Refills: 0 | Status: SHIPPED | OUTPATIENT
Start: 2020-01-11 | End: 2021-07-23

## 2020-01-11 RX ORDER — PREDNISONE 20 MG/1
20 TABLET ORAL DAILY
Qty: 5 TABLET | Refills: 0 | Status: SHIPPED | OUTPATIENT
Start: 2020-01-11 | End: 2020-07-08

## 2020-01-11 RX ORDER — AZITHROMYCIN 250 MG/1
TABLET, FILM COATED ORAL
Qty: 6 TABLET | Refills: 0 | Status: SHIPPED | OUTPATIENT
Start: 2020-01-11 | End: 2020-07-08

## 2020-01-12 NOTE — PROGRESS NOTES
SUBJECTIVE:   Laila Cueva is a 34 year old female presenting with a chief complaint of cough and chest congestion for  1.5 weeks   Last week  Had a fever up to 100.7.  Had  Some  Occasional dizziness.  Ears feels fine  And vision  Fine.  No underlying asthma or cardiac issues.  Had flu shot this year.   Course of illness is worsening.    Severity moderate  Current and Associated symptoms: negative other than stated above  Treatment measures tried include Tylenol/Ibuprofen, OTC Cough med, Fluids and Rest.  Predisposing factors include None.    Past Medical History:   Diagnosis Date     Atypical PKU (H)     since birth, manage with diet      Raynaud phenomenon     affects fingers and toes     Von Willebrand disease (H)     affects mainly menses     Von Willebrand disease (H)      Current Outpatient Medications   Medication Sig Dispense Refill     Ferrous Sulfate (IRON SUPPLEMENT PO) Take by mouth daily       Prenatal Vit-Fe Fumarate-FA (PRENATAL MULTIVITAMIN  PLUS IRON) 27-0.8 MG TABS Take 1 tablet by mouth daily       sapropterin dihydrochloride (KUVAN) 100 MG TBSO Take 13 tablets by mouth once daily with food. 360 tablet 11     Social History     Tobacco Use     Smoking status: Never Smoker     Smokeless tobacco: Never Used   Substance Use Topics     Alcohol use: No     Alcohol/week: 1.0 - 3.0 standard drinks     Types: 1 - 3 Standard drinks or equivalent per week       ROS:  Review of systems negative except as stated above.    OBJECTIVE:  /74   Pulse 56   Temp 97.6  F (36.4  C)   Wt 68.7 kg (151 lb 8 oz)   SpO2 98%   BMI 26.84 kg/m    GENERAL APPEARANCE: healthy, alert and no distress  EYES: EOMI,  PERRL, conjunctiva clear  HENT: ear canals and TM's normal.  Nose and mouth without ulcers, erythema or lesions  NECK: supple, nontender, no lymphadenopathy  RESP: scattered rhonchi.  No wheezing or crackles noted.  Normal effort   CV: regular rates and rhythm, normal S1 S2, no murmur noted  NEURO:  Normal strength and tone, sensory exam grossly normal,  normal speech and mentation  SKIN: no suspicious lesions or rashes    assessment/plan:  (J20.9) Acute bronchitis, unspecified organism  (primary encounter diagnosis)  Comment:   Plan: XR Chest 2 Views, azithromycin (ZITHROMAX) 250         MG tablet, albuterol (PROAIR HFA/PROVENTIL         HFA/VENTOLIN HFA) 108 (90 Base) MCG/ACT         inhaler, predniSONE (DELTASONE) 20 MG tablet          No clear infiltrate noted and report given.  Will treat due to sx and length of time.  Med as directed and inhaler as needed.  Side affects of Prednisone given.  OTC med for sx relief and to Follow-up with PCP as needed. RTC if SOB or chest pain develops

## 2020-02-25 ENCOUNTER — HOME INFUSION (PRE-WILLOW HOME INFUSION) (OUTPATIENT)
Dept: PHARMACY | Facility: CLINIC | Age: 35
End: 2020-02-25

## 2020-03-02 ENCOUNTER — MEDICAL CORRESPONDENCE (OUTPATIENT)
Dept: HEALTH INFORMATION MANAGEMENT | Facility: CLINIC | Age: 35
End: 2020-03-02

## 2020-06-24 ENCOUNTER — VIRTUAL VISIT (OUTPATIENT)
Dept: FAMILY MEDICINE | Facility: OTHER | Age: 35
End: 2020-06-24
Payer: COMMERCIAL

## 2020-06-24 PROCEDURE — 99421 OL DIG E/M SVC 5-10 MIN: CPT | Performed by: INTERNAL MEDICINE

## 2020-06-25 ENCOUNTER — AMBULATORY - HEALTHEAST (OUTPATIENT)
Dept: FAMILY MEDICINE | Facility: CLINIC | Age: 35
End: 2020-06-25

## 2020-06-25 DIAGNOSIS — Z20.822 SUSPECTED COVID-19 VIRUS INFECTION: ICD-10-CM

## 2020-06-25 NOTE — PROGRESS NOTES
"Date: 2020 19:03:14  Clinician: Angie Colin  Clinician NPI: [id number]  Patient: Laila Cueva  Patient : 1985  Patient Address: [address], [place] [address]  Patient Phone: [phone]  Visit Protocol: URI  Patient Summary:  Laila is a 35 year old ( : 1985 ) female who initiated a Visit for COVID-19 (Coronavirus) evaluation and screening. When asked the question \"Please sign me up to receive news, health information and promotions from [facility].\", Laila responded \"No\".    Laila states her symptoms started gradually 3-4 days ago.   Her symptoms consist of malaise, ear pain, a headache, a sore throat, enlarged lymph nodes, myalgia, and facial pain or pressure.   Symptom details     Sore throat: Laila reports having mild throat pain (1-3 on a 10 point pain scale), does not have exudate on her tonsils, and can swallow liquids. The lymph nodes in her neck are enlarged. A rash has not appeared on the skin since the sore throat started.     Facial pain or pressure: The facial pain or pressure feels worse when bending over or leaning forward.     Headache: She states the headache is mild (1-3 on a 10 point pain scale).      Laila denies having wheezing, nausea, teeth pain, ageusia, diarrhea, vomiting, rhinitis, chills, anosmia, fever, cough, and nasal congestion. She also denies having recent facial or sinus surgery in the past 60 days, taking antibiotic medication in the past month, and double sickening (worsening symptoms after initial improvement). She is not experiencing dyspnea.   Precipitating events  Laila is not sure if she has been exposed to someone with strep throat. She has not recently been exposed to someone with influenza. Laila has been in close contact with the following high risk individuals: children under the age of 5.   Pertinent COVID-19 (Coronavirus) information  In the past 14 days, Laila has not worked in a congregate living setting.   She does not work " or volunteer as healthcare worker or a  and does not work or volunteer in a healthcare facility.   Laila also has not lived in a congregate living setting in the past 14 days. She does not live with a healthcare worker.   Laila has not had a close contact with a laboratory-confirmed COVID-19 patient within 14 days of symptom onset.   Pertinent medical history  Laila does not get yeast infections when she takes antibiotics.   Laila does not need a return to work/school note.   Weight: 150 lbs   Laila does not smoke or use smokeless tobacco.   She is not sure if she is pregnant and denies breastfeeding. She has menstruated in the past month.   Additional information as reported by the patient (free text): Enlarged thyroid gland   Weight: 150 lbs    MEDICATIONS: vitamin A-vitamin C-vitamin E oral, ferrous sulfate oral, Kuvan oral, ALLERGIES: aspartame  Clinician Response:  Dear Laila,   Your symptoms show that you may have coronavirus (COVID-19). This illness can cause fever, cough and trouble breathing. Many people get a mild case and get better on their own. Some people can get very sick.  Based on the symptoms you have shared, I would like you to be re-checked in 2 to 3 days. Please call your family clinic to set up a video or phone visit.  Will I be tested for COVID-19?  We would like to test you for this virus.   Please call [phone] to schedule your visit. Explain that you were referred by [facility] to have a COVID-19 test. Be ready to share your [facility].   The following will serve as your written order for this COVID Test, ordered by me, for the indication of suspected COVID [Z20.828]: The test will be ordered in [employer], our electronic health record, after you are scheduled. It will show as ordered and authorized by Leonardo Watson MD.  Order: COVID-19 (Coronavirus) PCR for SYMPTOMATIC testing from [facility].  1.When it's time for your COVID test:   Stay at least 6 feet away from  "others. (If someone will drive you to your test, stay in the backseat, as far away from the  as you can.)   Cover your mouth and nose with a mask, tissue or washcloth.  Go straight to the testing site. Don't make any stops on the way there or back.      2.Starting now: Stay home and away from others (self-isolate) until:   You've had no fever---and no medicine that reduces fever---for 3 full days (72 hours). And...   Your other symptoms have gotten better. For example, your cough or breathing has improved. And...   At least 10 days have passed since your symptoms started.       During this time, don't leave the house except for testing or medical care.   Stay in your own room, even for meals. Use your own bathroom if you can.   Stay away from others in your home. No hugging, kissing or shaking hands. No visitors.  Don't go to work, school or anywhere else.    Clean \"high touch\" surfaces often (doorknobs, counters, handles, etc.). Use a household cleaning spray or wipes. You'll find a full list of  on the EPA website: www.epa.gov/pesticide-registration/list-n-disinfectants-use-against-sars-cov-2.   Cover your mouth and nose with a mask, tissue or washcloth to avoid spreading germs.  Wash your hands and face often. Use soap and water.  Caregivers in these groups are at risk for severe illness due to COVID-19:  o People 65 years and older  o People who live in a nursing home or long-term care facility  o People with chronic disease (lung, heart, cancer, diabetes, kidney, liver, immunologic)   o People who have a weakened immune system, including those who:   Are in cancer treatment  Take medicine that weakens the immune system, such as corticosteroids  Had a bone marrow or organ transplant  Have an immune deficiency  Have poorly controlled HIV or AIDS  Are obese (body mass index of 40 or higher)  Smoke regularly   o Caregivers should wear gloves while washing dishes, handling laundry and cleaning bedrooms " and bathrooms.  o Use caution when washing and drying laundry: Don't shake dirty laundry, and use the warmest water setting that you can.  o For more tips, go to www.cdc.gov/coronavirus/2019-ncov/downloads/10Things.pdf.      How can I take care of myself?   Get lots of rest. Drink extra fluids (unless a doctor has told you not to)   Take Tylenol (acetaminophen) for fever or pain. If you have liver or kidney problems, ask your family doctor if it's okay to take Tylenol.   Adults can take either:    650 mg (two 325 mg pills) every 4 to 6 hours, or...   1,000 mg (two 500 mg pills) every 8 hours as needed.    Note: Don't take more than 3,000 mg in one day. Acetaminophen is found in many medicines (both prescribed and over-the-counter medicines). Read all labels to be sure you don't take too much.   For children, check the Tylenol bottle for the right dose. The dose is based on the child's age or weight.    If you have other health problems (like cancer, heart failure, an organ transplant or severe kidney disease): Call your specialty clinic if you don't feel better in the next 2 days.       Know when to call 911. Emergency warning signs include:    Trouble breathing or shortness of breath Pain or pressure in the chest that doesn't go away Feeling confused like you haven't felt before, or not being able to wake up Bluish-colored lips or face  Where can I get more information?   [facility] -- About COVID-19: [email or web address]   CDC -- What to Do If You're Sick: www.cdc.gov/coronavirus/2019-ncov/about/steps-when-sick.html   CDC -- Ending Home Isolation: www.cdc.gov/coronavirus/2019-ncov/hcp/disposition-in-home-patients.html   CDC -- Caring for Someone: www.cdc.gov/coronavirus/2019-ncov/if-you-are-sick/care-for-someone.html   [facility] -- Interim Guidance for Hospital Discharge to Home: www.health.[facility].[place]./diseases/coronavirus/hcp/hospdischarge.pdf   [facility] clinical trials (COVID-19  research studies): clinicalaffairs.[facility]-clinical-trials    Below are the COVID-19 hotlines at the [place] Department of Health ([facility]). Interpreters are available.    For health questions: Call 007-912-8705 or 1-518.306.3037 (7 a.m. to 7 p.m.) For questions about schools and childcare: Call 272-702-3412 or 1-415.581.5244 (7 a.m. to 7 p.m.)       COVID-19 (Coronavirus) General Information  Because there is currently no vaccine to prevent infection, the best way to protect yourself is to avoid being exposed to this virus. Common symptoms of COVID-19 include but are not limited to fever, cough, and shortness of breath. These symptoms appear 2-14 days after you are exposed to the virus that causes COVID-19. Click here for more information from the CDC on how to protect yourself.  If you are sick with COVID-19 or suspect you are infected with the virus that causes COVID-19, follow the steps here from the CDC to help prevent the disease from spreading to people in your home and community.  Click here for general information from the CDC on testing.  If you develop any of these emergency warning signs for COVID-19, get medical attention immediately:     Trouble breathing    Persistent pain or pressure in the chest    New confusion or inability to arouse    Bluish lips or face      Call your doctor or clinic before going in. Call [phone] if you have a medical emergency and notify the  you have or think you may have COVID-19.  For more detailed and up to date information on COVID-19 (Coronavirus), please visit the CDC website.   Diagnosis: Pain in throat  Diagnosis ICD: R07.0

## 2020-06-27 ENCOUNTER — COMMUNICATION - HEALTHEAST (OUTPATIENT)
Dept: FAMILY MEDICINE | Facility: CLINIC | Age: 35
End: 2020-06-27

## 2020-07-08 ENCOUNTER — OFFICE VISIT (OUTPATIENT)
Dept: OBGYN | Facility: CLINIC | Age: 35
End: 2020-07-08
Payer: COMMERCIAL

## 2020-07-08 VITALS
BODY MASS INDEX: 26.32 KG/M2 | HEART RATE: 75 BPM | SYSTOLIC BLOOD PRESSURE: 105 MMHG | WEIGHT: 148.6 LBS | DIASTOLIC BLOOD PRESSURE: 73 MMHG

## 2020-07-08 DIAGNOSIS — Z30.430 ENCOUNTER FOR IUD INSERTION: Primary | ICD-10-CM

## 2020-07-08 DIAGNOSIS — E07.9 LUMP IN THYROID: ICD-10-CM

## 2020-07-08 DIAGNOSIS — Z12.4 CERVICAL CANCER SCREENING: ICD-10-CM

## 2020-07-08 LAB — HCG UR QL: NEGATIVE

## 2020-07-08 PROCEDURE — 87624 HPV HI-RISK TYP POOLED RSLT: CPT | Performed by: OBSTETRICS & GYNECOLOGY

## 2020-07-08 PROCEDURE — 36415 COLL VENOUS BLD VENIPUNCTURE: CPT | Performed by: OBSTETRICS & GYNECOLOGY

## 2020-07-08 PROCEDURE — 58300 INSERT INTRAUTERINE DEVICE: CPT | Performed by: OBSTETRICS & GYNECOLOGY

## 2020-07-08 PROCEDURE — 84443 ASSAY THYROID STIM HORMONE: CPT | Performed by: OBSTETRICS & GYNECOLOGY

## 2020-07-08 PROCEDURE — 99214 OFFICE O/P EST MOD 30 MIN: CPT | Mod: 25 | Performed by: OBSTETRICS & GYNECOLOGY

## 2020-07-08 PROCEDURE — 81025 URINE PREGNANCY TEST: CPT | Performed by: OBSTETRICS & GYNECOLOGY

## 2020-07-08 PROCEDURE — G0145 SCR C/V CYTO,THINLAYER,RESCR: HCPCS | Performed by: OBSTETRICS & GYNECOLOGY

## 2020-07-08 NOTE — PROGRESS NOTES
CC:  IUD insertion  HPI:  Laila Cueva is a 35 year old female Patient's last menstrual period was 06/27/2020 (exact date).  Menses are regular q 28-30 days, lasting 6 days.  She is here for an IUD insertion. Patient has verbalized understanding of risks and benefits and has signed the consent form.    In addition, about a month ago she noticed a small lump on her thyroid.  It is non tender and she thinks it has gotten a little smaller, but persists.  She denies changes in weight, hot/cold intol, hair/skin/nails.    Past GYN history:  No STD history       Last PAP smear:  Normal    The patient's past medical history, social history and family history is reviewed at our visit and updated in EPIC.    Allergies: Aspartame and Nuts    Current Outpatient Medications   Medication Sig Dispense Refill     albuterol (PROAIR HFA/PROVENTIL HFA/VENTOLIN HFA) 108 (90 Base) MCG/ACT inhaler Inhale 2-4 puffs into the lungs every 4 hours as needed for shortness of breath / dyspnea or wheezing (Patient not taking: Reported on 7/8/2020) 18 g 0     Ferrous Sulfate (IRON SUPPLEMENT PO) Take by mouth daily       Prenatal Vit-Fe Fumarate-FA (PRENATAL MULTIVITAMIN  PLUS IRON) 27-0.8 MG TABS Take 1 tablet by mouth daily       sapropterin dihydrochloride (KUVAN) 100 MG TBSO Take 13 tablets by mouth once daily with food. 360 tablet 11         ROS:  C: NEGATIVE for fever, chills, change in weight  R: NEGATIVE for significant cough or SOB  CV: NEGATIVE for chest pain, palpitations or peripheral edema  GI: NEGATIVE for nausea, abdominal pain, heartburn, or change in bowel habits  : NEGATIVE for frequency, dysuria, hematuria, vaginal discharge, or irregular bleeding      EXAM:  Blood pressure 105/73, pulse 75, weight 67.4 kg (148 lb 9.6 oz), last menstrual period 06/27/2020, currently breastfeeding.  General - pleasant female in no acute distress.  Neck: 1cm smooth, slightly mobile NT mass on thyroid just slightly right of midline  Pelvic -  EG: normal adult female, BUS: within normal limits, Vagina: well rugated, no discharge, Cervix: no lesions or CMT, Uterus: firm, normal sized and nontender, Adnexae: no masses or tenderness.    PROCEDURE:  After informed consent was obtained from the patient, a speculum was placed in the vagina to visualized the cervix.  A pap smear was done. The cervix was then swabbed with a betadine prep.  Tenaculum was placed at the 12 o'clock position on the cervix and the uterus sounded to 8cm.  The Mirena  IUD was then placed in the usual fashion under sterile technique.  Strings were clipped about 3-4 cm from the cervical os.  Tenaculum was removed and cervix was hemostatic. There were no complications. The patient tolerated the procedure well.      ASSESSMENT/PLAN:  1) Contraception, successful IUD placement  The patient should feel for the IUD strings after her next menses.  If unable to locate them, she should return to clinic for a speculum examination for confirmation that the IUD is in place. Bleeding pattern of this particular IUD was discussed with the patient. She is aware that the IUD will need to be removed in 5 years or PRN.      2) thyroid nodule   TSH with reflex today.  Thyroid us ordered and referral to endocrinology given.    3) cervical cancer screening   cotesting done.  RTC 1 yr or prn      Erika De La Paz MD

## 2020-07-08 NOTE — NURSING NOTE
"Chief Complaint   Patient presents with     IUD     iud insertion, lon ndc: 15946-121-96, LOT: [id number], exp: 10/2022       Initial /73   Pulse 75   Wt 67.4 kg (148 lb 9.6 oz)   LMP 2020 (Exact Date)   BMI 26.32 kg/m   Estimated body mass index is 26.32 kg/m  as calculated from the following:    Height as of 19: 1.6 m (5' 3\").    Weight as of this encounter: 67.4 kg (148 lb 9.6 oz).  BP completed using cuff size: regular    Questioned patient about current smoking habits.  Pt. has never smoked.          The following HM Due: pap smear      The following patient reported/Care Every where data was sent to:  P ABSTRACT QUALITY INITIATIVES [[id number]]        patient has appointment for today  Rebecca Moulton                "

## 2020-07-09 LAB — TSH SERPL DL<=0.005 MIU/L-ACNC: 1.48 MU/L (ref 0.4–4)

## 2020-07-13 ENCOUNTER — TELEPHONE (OUTPATIENT)
Dept: NUTRITION | Facility: CLINIC | Age: 35
End: 2020-07-13

## 2020-07-13 ENCOUNTER — VIRTUAL VISIT (OUTPATIENT)
Dept: PEDIATRICS | Facility: CLINIC | Age: 35
End: 2020-07-13
Attending: PEDIATRICS
Payer: COMMERCIAL

## 2020-07-13 DIAGNOSIS — E70.1 PHENYLKETONURIA (PKU) (H): Primary | ICD-10-CM

## 2020-07-13 NOTE — PROGRESS NOTES
"Laila Cueva is a 35 year old female who is being evaluated via a billable video visit.      The patient has been notified of following:     \"This video visit will be conducted via a call between you and your physician/provider. We have found that certain health care needs can be provided without the need for an in-person physical exam.  This service lets us provide the care you need with a video conversation.  If a prescription is necessary we can send it directly to your pharmacy.  If lab work is needed we can place an order for that and you can then stop by our lab to have the test done at a later time.    Video visits are billed at different rates depending on your insurance coverage.  Please reach out to your insurance provider with any questions.    If during the course of the call the physician/provider feels a video visit is not appropriate, you will not be charged for this service.\"    Patient has given verbal consent for Video visit? Yes  How would you like to obtain your AVS? MyChart  Patient would like the video invitation sent by:  Will anyone else be joining your video visit? Eduard Pacheco, Do and JESSY Eli LPN    Laila Cueva is a 35 year old female who is being evaluated via a billable video visit.      The patient has been notified of following:     \"This video visit will be conducted via a call between you and your physician/provider. We have found that certain health care needs can be provided without the need for an in-person physical exam.  This service lets us provide the care you need with a video conversation.  If a prescription is necessary we can send it directly to your pharmacy.  If lab work is needed we can place an order for that and you can then stop by our lab to have the test done at a later time.    Video visits are billed at different rates depending on your insurance coverage.  Please reach out to your insurance provider with any " "questions.    If during the course of the call the physician/provider feels a video visit is not appropriate, you will not be charged for this service.\"    Patient has given verbal consent for Video visit? Yes  How would you like to obtain your AVS? Rosie  Patient would like the video invitation sent by: Send to e-mail at: [email or web address]  Will anyone else be joining your video visit? No        Video-Visit Details    Type of service:  Video Visit    Video Start Time: 2:59 PM  Video End Time: 3:22 PM    Originating Location (pt. Location): Home    Distant Location (provider location):  PEDS PKU     Platform used for Video Visit: Araceli Monroy MD              Phenylketonuria (PKU) Clinic  [facility] [address] [place] [address]  Phone: [phone]  Fax: [phone]  Date: 2020      Patient:  Laila Cueva   :   1985   MRN:     [id number]      Laila Cueva  [address] [place] [address]    Dear Dr. Billie Mathews and Parents of Laila Cueva,    CHIEF COMPLAINT:     I had the pleasure of seeing Laila Cueva in the PKU and Maternal PKU Clinic at the [facility] regarding phenylketonuria (PKU). This here for evaluation and treatment.      Today we meet virtually with Laila, a 35 year old female with primary diagnosis of PKU.  Since the last visit, she tells us that she had a  in 2019 with the birth of her second child.      She also tells us that she noticed a lump in the bottom of her neck and went to her OB who told her that it was a cyst and more testing needed to be performed.      She indicates that she eats no more than 20 grams of protein per meal.  Her PKU is managed with Kuvan, 1300 mg by mouth everyday.     PAST MEDICAL HISTORY:    These list of past medical problems includes:    Patient Active Problem List   Diagnosis     Acne     CARDIOVASCULAR SCREENING; LDL GOAL LESS THAN 160     Dermatofibroma " of left lower leg     Phenylketonuria (PKU) (H)     Von Willebrand disease (H)     Maternal phenylketonuria in third trimester (H)     Intramural leiomyoma of uterus       FAMILY HISTORY: A brief family medical history was reviewed.  MEDICATIONS:   Current Outpatient Medications   Medication Sig     Ferrous Sulfate (IRON SUPPLEMENT PO) Take by mouth daily     sapropterin dihydrochloride (KUVAN) 100 MG TBSO Take 13 tablets by mouth once daily with food.     albuterol (PROAIR HFA/PROVENTIL HFA/VENTOLIN HFA) 108 (90 Base) MCG/ACT inhaler Inhale 2-4 puffs into the lungs every 4 hours as needed for shortness of breath / dyspnea or wheezing (Patient not taking: Reported on 7/8/2020)     Prenatal Vit-Fe Fumarate-FA (PRENATAL MULTIVITAMIN  PLUS IRON) 27-0.8 MG TABS Take 1 tablet by mouth daily     No current facility-administered medications for this visit.      REVIEW OF SYSTEMS: The review of systems negative for new eye, ear, heart, lung, liver, spleen, gastrointestinal, bone, muscle, integumentary, endocrinologic, brain or psychiatric issues except as noted above.  PHYSICAL EXAMINATION:  Demographics: LMP 06/27/2020 (Exact Date)   General: The patient is oriented to person, place and time at an age-appropriate manner.   HEENT: The facial features are normal and symmetric.   The gaze is conjugate and extraocular motions are full and intact.The pupils are equal, round and reactive to light.  The ears are of normal position and configuration and hearing is grossly normal.  Neck: The neck is supple with full range of motion  Chest: The chest is of normal configuration.  Heart: Not examined.  Abdomen: Not examined.  Extremities: The extremities are of normal configuration without contractures nor hyperlaxities. Strength and tone appear to be normal and symmetric.Integument: The integument is  of normal appearance without significant changes in pigmentation, birthmarks, or lesions.  Neurologic:  Mental Status Exam:  Alert,  "awake. Fully oriented. No dysarthria, no dysphasia. Speech of normal fluency.    LABORATORY RESULTS: Previous studies showed pathologically elevated blood phenylalanine levels as well as specific PAH mutatons and excluded disorders of biopterin recycling. Laboratory studies from the past year were reviewed.    ASSESSMENT:  1. Phenylketonuria (PKU).  2. Goof control with Kuvan, 1300 mg by mouth everyday.  Her last reported phe levels (in mg/dL) are as follows:  01 Aug 19: 2.2  11 July 19: 2.2  20 June 19: 3.5  10 Nanette 19:  1.8  She said she did not submit phe levels around the time of her child's delivery.    PLAN/RECOMMENDATIONS:  1. Continue taking Kuvan, 1300 mg by mouth everyday.  2. Send blood \"tyrosine and phenylalanine\" levels monthly.  3. Metabolic PKU Dietician Consult today for regular diet assessment and nutritional management including the patient's prescribed phenylalanine intake.  4. Continue low phenylalanine diet (250-400 mg phenylalanine/day, or as modified by consultation with the Metabolic PKU Dietician considering recent blood phenylalanine levels, diet history, and impact of sapropterin, if taken).  5. Return to PKU Clinic in 12 months.    FOLLOW-UP PLAN:  If you are returning to clinic to review specific laboratory tests, please call the Genetic Counselor (see phone numbers below) to confirm that we have received all of the results from reference laboratories prior to your appointment. If we have not received all of the test results, please discuss re-scheduling your appointment.    With warmest regards,     Quan Monroy PhD, MD  Medical Director, PKU Clinic  Professor of Pediatrics, Medical School, and  Experimental and Clinical Pharmacology, [employer]    Appointments: [phone]      Monday mornings: [facility] Lysosomal Diseases Clinic   Monday afternoons: PKU Clinic, Metabolism Clinic, and Genetics Clinic    Pharmacotherapy Consultant:  Eduard Pacheco, PharmD, " Pharmacotherapy for Metabolic Disorders (PIMD): [phone]    Genetic Counselor:  Patricia Ayers MS, [facility] [place]Genetic test Results): [phone]    Metabolic Dietician:  Gay Lopez, Registered Dietician: [phone]    [facility] Clinic Scheduler:  Ana Maria Chau, [phone]    Nurse Coordinator, PKU, Metabolism and Genetics:  Nirali Gooden RN, [phone]    Copies:    Dr. Billie Mathews  [address] [facility] [address]    Laila Cueva  [address] [place] [address]    Dr. Millan referring provider defined for this encounter.

## 2020-07-13 NOTE — NURSING NOTE
Chief Complaint   Patient presents with     Consult     PKU     There were no vitals filed for this visit.  Irena Bee LPN  July 13, 2020

## 2020-07-13 NOTE — LETTER
2020      RE: Laila Cueva  [address] [place] [address]       Laila Cueva is a 35 year old female who is being evaluated via a billable video visit.      Video-Visit Details    Type of service:  Video Visit    Video Start Time: 2:59 PM  Video End Time: 3:22 PM    Originating Location (pt. Location): Home    Distant Location (provider location):  PEDS PKU     Platform used for Video Visit: Araceli Monroy MD              Phenylketonuria (PKU) Clinic  [address] [address]  Phone: [phone]  Fax: [phone]  Date: 2020      Patient:  Laila Cueva   :   1985   MRN:     [id number]      Laila Cueva  [address] [place] [address]    Dear Dr. Billie Mathews and Parents of Laila Cueva,    CHIEF COMPLAINT:     I had the pleasure of seeing Laila Cueva in the PKU and Maternal PKU Clinic at the [facility] regarding phenylketonuria (PKU). This here for evaluation and treatment.      Today we meet virtually with Laila, a 35 year old female with primary diagnosis of PKU.  Since the last visit, she tells us that she had a  in 2019 with the birth of her second child.      She also tells us that she noticed a lump in the bottom of her neck and went to her OB who told her that it was a cyst and more testing needed to be performed.      She indicates that she eats no more than 20 grams of protein per meal.  Her PKU is managed with Kuvan, 1300 mg by mouth everyday.     PAST MEDICAL HISTORY:    These list of past medical problems includes:    Patient Active Problem List   Diagnosis     Acne     CARDIOVASCULAR SCREENING; LDL GOAL LESS THAN 160     Dermatofibroma of left lower leg     Phenylketonuria (PKU) (H)     Von Willebrand disease (H)     Maternal phenylketonuria in third trimester (H)     Intramural leiomyoma of uterus       FAMILY HISTORY: A brief family medical history was  reviewed.  MEDICATIONS:   Current Outpatient Medications   Medication Sig     Ferrous Sulfate (IRON SUPPLEMENT PO) Take by mouth daily     sapropterin dihydrochloride (KUVAN) 100 MG TBSO Take 13 tablets by mouth once daily with food.     albuterol (PROAIR HFA/PROVENTIL HFA/VENTOLIN HFA) 108 (90 Base) MCG/ACT inhaler Inhale 2-4 puffs into the lungs every 4 hours as needed for shortness of breath / dyspnea or wheezing (Patient not taking: Reported on 7/8/2020)     Prenatal Vit-Fe Fumarate-FA (PRENATAL MULTIVITAMIN  PLUS IRON) 27-0.8 MG TABS Take 1 tablet by mouth daily     No current facility-administered medications for this visit.      REVIEW OF SYSTEMS: The review of systems negative for new eye, ear, heart, lung, liver, spleen, gastrointestinal, bone, muscle, integumentary, endocrinologic, brain or psychiatric issues except as noted above.  PHYSICAL EXAMINATION:  Demographics: LMP 06/27/2020 (Exact Date)   General: The patient is oriented to person, place and time at an age-appropriate manner.   HEENT: The facial features are normal and symmetric.   The gaze is conjugate and extraocular motions are full and intact.The pupils are equal, round and reactive to light.  The ears are of normal position and configuration and hearing is grossly normal.  Neck: The neck is supple with full range of motion  Chest: The chest is of normal configuration.  Heart: Not examined.  Abdomen: Not examined.  Extremities: The extremities are of normal configuration without contractures nor hyperlaxities. Strength and tone appear to be normal and symmetric.Integument: The integument is  of normal appearance without significant changes in pigmentation, birthmarks, or lesions.  Neurologic:  Mental Status Exam:  Alert, awake. Fully oriented. No dysarthria, no dysphasia. Speech of normal fluency.    LABORATORY RESULTS: Previous studies showed pathologically elevated blood phenylalanine levels as well as specific PAH mutatons and excluded  "disorders of biopterin recycling. Laboratory studies from the past year were reviewed.    ASSESSMENT:  1. Phenylketonuria (PKU).  2. Goof control with Kuvan, 1300 mg by mouth everyday.  Her last reported phe levels (in mg/dL) are as follows:  01 Aug 19: 2.2  11 July 19: 2.2  20 June 19: 3.5  10 Nanette 19:  1.8  She said she did not submit phe levels around the time of her child's delivery.    PLAN/RECOMMENDATIONS:  1. Continue taking Kuvan, 1300 mg by mouth everyday.  2. Send blood \"tyrosine and phenylalanine\" levels monthly.  3. Metabolic PKU Dietician Consult today for regular diet assessment and nutritional management including the patient's prescribed phenylalanine intake.  4. Continue low phenylalanine diet (250-400 mg phenylalanine/day, or as modified by consultation with the Metabolic PKU Dietician considering recent blood phenylalanine levels, diet history, and impact of sapropterin, if taken).  5. Return to PKU Clinic in 12 months.    FOLLOW-UP PLAN:  If you are returning to clinic to review specific laboratory tests, please call the Genetic Counselor (see phone numbers below) to confirm that we have received all of the results from reference laboratories prior to your appointment. If we have not received all of the test results, please discuss re-scheduling your appointment.    With warmest regards,     Quan Monroy PhD MD  Medical Director, PKU Clinic  Professor of Pediatrics, Medical School, and  Experimental and Clinical Pharmacology, [employer]    Appointments: [phone]      Monday mornings: [facility] Lysosomal [facility] Clinic   Monday afternoons: PKU Clinic, Metabolism Clinic, and Genetics Clinic    Pharmacotherapy Consultant:  Eduard Pacheco, PharmD, Pharmacotherapy for Metabolic Disorders ([facility]): [phone]    Genetic Counselor:  Patricia Ayers MS, [place] (Genetic test Results): [phone]    Metabolic Dietician:  Gay Lopez, Registered Dietician: " [phone]    [facility]rapies Clinic Scheduler:  Ana Maria Chau, [phone]    Nurse Coordinator, PKU, Metabolism and Genetics:  Nirali Gooden RN, [phone]    Copies:    Dr. Billie Mathews  [address] [facility] [address]    Laila Cueva  [address] [place] [address]     No referring provider defined for this encounter.    Bishop Monroy MD

## 2020-07-14 LAB
COPATH REPORT: NORMAL
PAP: NORMAL

## 2020-07-15 LAB
FINAL DIAGNOSIS: NORMAL
HPV HR 12 DNA CVX QL NAA+PROBE: NEGATIVE
HPV16 DNA SPEC QL NAA+PROBE: NEGATIVE
HPV18 DNA SPEC QL NAA+PROBE: NEGATIVE
SPECIMEN DESCRIPTION: NORMAL
SPECIMEN SOURCE CVX/VAG CYTO: NORMAL

## 2020-07-22 ENCOUNTER — HOSPITAL ENCOUNTER (OUTPATIENT)
Dept: ULTRASOUND IMAGING | Facility: CLINIC | Age: 35
Discharge: HOME | End: 2020-07-22
Attending: OBSTETRICS & GYNECOLOGY | Admitting: OBSTETRICS & GYNECOLOGY
Payer: COMMERCIAL

## 2020-07-22 DIAGNOSIS — E07.9 LUMP IN THYROID: ICD-10-CM

## 2020-07-22 PROCEDURE — 76536 US EXAM OF HEAD AND NECK: CPT

## 2020-07-24 NOTE — TELEPHONE ENCOUNTER
RECORDS RECEIVED FROM: Internal   DATE RECEIVED: 7.29.20    NOTES (FOR ALL VISITS) STATUS DETAILS   OFFICE NOTES from referring provider Internal 7.8.20 LONNIE De La Paz   OFFICE NOTES from other specialist N/A    ED NOTES N/A    OPERATIVE REPORT  (thyroid, pituitary, adrenal, parathyroid) N/A    MEDICATION LIST Internal    IMAGING      DEXASCAN N/A    MRI (BRAIN) N/A    XR (Chest) Internal 1.11.20   CT (HEAD/NECK/CHEST/ABDOMEN) N/A    NUCLEAR  N/A    ULTRASOUND (HEAD/NECK) Internal 7.22.20 Thyroid   LABS     DIABETES: HBGA1C, CREATININE, FASTING LIPIDS, MICROALBUMIN URINE, POTASSIUM, TSH, T4    THYROID: TSH, T4, CBC, THYRODLONULIN, TOTAL T3, FREE T4, CALCITONIN, CEA Internal

## 2020-07-27 PROCEDURE — 84510 ASSAY OF TYROSINE: CPT | Performed by: PEDIATRICS

## 2020-07-27 NOTE — PROGRESS NOTES
.CLINICAL NUTRITION SERVICES - PEDIATRIC ASSESSMENT NOTE     REASON FOR ASSESSMENT  Laila Cueva is a 35 year old female seen by the dietitian for consult regarding mild PKU.     ANTHROPOMETRICS  Height: 160.5 cm or 63.2 in  Weight: (verbal per patient): 66-68 kg or 145-150 lbs  BMI: 25.6  IBW: 52 kg  Usual weight pre-pregnancies: 57 kg or 125 lbs     NUTRITION HISTORY  Patient follows a low protein diet of 30-45 grams/day.     Breakfast (10 gm or less):   -2 slices Shara Walter white bread + jelly + tea or juice  -1-2 eggs scrambled + tea or juice    Lunch (15 gm):   -piotta (Italian burrito made with petersen, cheese, avocado)  -rice with veggies, small piece of chicken  -French fries    Dinner: (<20 gm)  -lots of pasta options, such as oc  -sometimes a sandwich with 1 slice of cheese + 1 thin slice deli meat    Snacks:  -chips (her stated weakness), Ramen     -Patient has had 2 pregnancies in the past few years.  She feels she is gained weight from these and is interested in information on weight loss strategies as she would like to lose several lbs.  Her  is currently doing the Keto diet.     PKU FORMULA  Phenylade Essentials (strawberry) - 1-2 packets/day     This would supply 314 kcals/day (5 kcal/kg), 20 grams protein (0.3 g/kg; total protein 0.8 g/kg, 480 international units Vitamin D, 658 mg calcium, and 8.8 mg iron.       LABS  Labs reviewed;        PHE     TYR  Aug 2019 2.2 0.7  July 2019 2.2 0.8  June 2019 3.5 0.7                MEDICATIONS  Medications reviewed;   -Kuvan daily                ASSESSED NUTRITION NEEDS:  Estimated Energy Needs: 25-30 kcal/kg (0649-5438 kcals/day)  Estimated Protein Needs: RDA for age = 0.8 g/kg, optimal range 0.8-1.2 g/kg  Micronutrient Needs: 600 international units Vitamin D                 NUTRITION DIAGNOSIS:  Impaired nutrient utilization related to diagnosis of mild PKU as evidenced by elevated PHE levels.     INTERVENTIONS  Nutrition Prescription  Meet 100%  estimated kcal, protein needs through moderate-protein restricted diet    Nutrition Education:   Provided education on goals for nutrition for PKU.     Reviewed weight/changes, intakes, and most recent levels.  -We discussed the role of formula in protein intake for weight loss, satiety.  Patient may benefit from GMP based formula and we discussed the difference between this option and regular amino acid options.  Patient has several samples at home to try (PKU Sphere 20, Glytactin RTD Lite 15, all low calorie options).  Once formula selected, take 2-3 daily prior to meals to assist in portion control at meals.  -Discussed apps available for logging intake ([employer], [employer]) to log protein intake and calories.  Suggested goal starting with <1800 kcals/day.  Log intake for 3-4 days and send to RD for review/suggestions.  -Emphasized portion control of meals/snacks, increasing fruits and vegetables in order to decrease carbohydrates with moderate protein + fat at each meal (I.e. 1 slice toast w/small amount of peanut butter over 2 slices with jelly).    -Increase activity with goal of 30 minutes 3-4 times/week     Goals  1. Weight loss of 1-2 lbs/week, decrease in BMI (<24)  2. Meet >85% estimated nutrition needs through low/moderate protein diet  3. PHE levels 2-6 mg/dL    FOLLOW UP/MONITORING  Energy Intake  Macronutrient intake  Anthropometric measurements     Gay Lopez, RD, CSP, LD

## 2020-07-28 NOTE — PROGRESS NOTES
"thyroid nodule  Danyelle Nam CMA    Laila Cueva is a 35 year old female who is being evaluated via a billable video visit.      The patient has been notified of following:     \"This video visit will be conducted via a call between you and your physician/provider. We have found that certain health care needs can be provided without the need for an in-person physical exam.  This service lets us provide the care you need with a video conversation.  If a prescription is necessary we can send it directly to your pharmacy.  If lab work is needed we can place an order for that and you can then stop by our lab to have the test done at a later time.    Video visits are billed at different rates depending on your insurance coverage.  Please reach out to your insurance provider with any questions.    If during the course of the call the physician/provider feels a video visit is not appropriate, you will not be charged for this service.\"    Patient has given verbal consent for Video visit? Yes  How would you like to obtain your AVS? MyChart  If you are dropped from the video visit, the video invite should be resent to: Text to cell phone: cell  Will anyone else be joining your video visit? No        Video-Visit Details    Type of service:  Video Visit    Start: 07/29/2020 03:18 pm   Stop: 07/29/2020 03:48 pm     Originating Location (pt. Location): Home    Distant Location (provider location):  [facility] ENDOCRINOLOGY     Platform used for Video Visit: [facility]                                                                               - Endocrinology Initial Consultation -    Reason for visit/consult:  thyroid nodule    Primary care provider: Billie Mathews    HPI: A 34 yo female here for the evaluation for her thyroid nodules. She felt lump in her neck and went to OB appointment, where she was recommended to do ultrasound and TSH test.   Ultrasound was done on 7/22/2020 showed 4 cystic nodules, largest was " [id number] in isthmus, and others are subcentimeter.   Family history of thryoid nodule in her father, but no history of thryoid cancer. Other medical history includes phenylketonuria which she has been taking Kuvan.     Past Medical/Surgical History:  Past Medical History:   Diagnosis Date     Atypical PKU (H)     since birth, manage with diet      Raynaud phenomenon     affects fingers and toes     Von Willebrand disease (H)     affects mainly menses     Von Willebrand disease (H)      Past Surgical History:   Procedure Laterality Date     BIOPSY OF SKIN LESION      2 benign nevus removed      SECTION N/A 2017    Procedure:  SECTION;  Surgeon: Ana Maria Israel MD;  Location: [facility]      SECTION N/A 2019    Procedure: Repeat  Section;  Surgeon: Erika De La Paz MD;  Location: [facility]     HC TOOTH EXTRACTION W/FORCEP         Allergies:  Allergies   Allergen Reactions     Aspartame      Nuts Other (See Comments)       Current Medications   Current Outpatient Medications   Medication     Ferrous Sulfate (IRON SUPPLEMENT PO)     sapropterin dihydrochloride (KUVAN) 100 MG solu-tablet     albuterol (PROAIR HFA/PROVENTIL HFA/VENTOLIN HFA) 108 (90 Base) MCG/ACT inhaler     Prenatal Vit-Fe Fumarate-FA (PRENATAL MULTIVITAMIN  PLUS IRON) 27-0.8 MG TABS     No current facility-administered medications for this visit.        Family History:  Family History   Problem Relation Age of Onset     Anemia Mother      Myocardial Infarction Father 64        alive     Hypertension Father      Melanoma Maternal Grandmother          from another cancer type     Anemia Maternal Grandmother        Social History:  Social History     Tobacco Use     Smoking status: Never Smoker     Smokeless tobacco: Never Used   Substance Use Topics     Alcohol use: No     Alcohol/week: 1.0 - 3.0 standard drinks     Types: 1 - 3 Standard drinks or equivalent per week       ROS:  Full review of  systems taken with the help of the intake sheet. Otherwise a complete 14 point review of systems was taken and is negative unless stated in the history above.        Physical Exam:   Vitals: There were no vitals taken for this visit.  BMI= There is no height or weight on file to calculate BMI.   General: well appearing, no acute distress, pleasant and conversant,   Mental Status/neuro: alert and oriented  Face: symmetrical, normal facial color  Eyes: anicteric, no proptosis or lid lag  Resp: no acute distress        Labs : I reviewed data from epic and extract and summarize the pertinent data here.   Lab Results   Component Value Date     08/10/2015      Lab Results   Component Value Date    POTASSIUM 3.8 08/10/2015     Lab Results   Component Value Date    CHLORIDE 107 08/10/2015     Lab Results   Component Value Date    MIGUE 9.2 08/10/2015     Lab Results   Component Value Date    CO2 24 08/10/2015     Lab Results   Component Value Date    BUN 6 08/10/2015     Lab Results   Component Value Date    CR 0.87 08/10/2015     Lab Results   Component Value Date    GLC 84 08/10/2015     Lab Results   Component Value Date    TSH 1.48 07/08/2020     No results found for: T4  No results found for: A1C    No results found for: IGF1  No results found for: LH    Thyroid ultrasoud: I personally reviewed the original images and explained and showed to the patient today.     Exam: US THYROID, 7/22/2020 4:15 PM     Indication: lump on thyroid; Lump in thyroid     Comparison: None     Findings:   Right thyroid lobe measures 5.7 x 1.5 x 1.5 cm and the left thyroid  lobe measures 5.6 x 1.6 x 0.9 cm. The isthmus measures 0.2 cm.     Thyroid parenchyma is normal in echogenicity and echotexture. Within  the right thyroid lobe there is a peripheral 0.3 x 0.3 x 0.1 cm  anechoic cyst. No abnormal blood flow or calcification.     Within the left thyroid lobe there is a 0.7 x 0.7 x 0.4 cm spongiform  nodule which is well-defined and  without microcalcification or  internal blood flow. In the inferior left thyroid lobe there is a 7 x  5 x 3 mm predominantly cystic nodule, also without internal blood flow  or calcification.     Along the superior margin of the isthmus there is a 1.7 x 1.5 x 1.4 cm  predominantly anechoic nodule, but with a mural soft tissue component.  No abnormal blood flow or identified calcification.                                                                      Impression: Majority of the thyroid nodules are benign or not  suspicious using TIRADS criteria. The larger lesion in the isthmus is  predominantly anechoic, but contains a soft tissue mural nodule.  Consider follow-up to ensure stability.    Assessment and Plan  35 year old female with thyroid nodules    4 cystic nodules, smooth margin, small cellular compornents seen. I did not see any highly suspicious findings of thyroid cancer, we will follow up in 1 year,     - repeat thyroid ultrasound in 1 year    - TSH, free T4, TPO in 1 month    - if patient notices to growing nodule, she will contact us and we will set up ultrasound earlier.     RTC with me in 1 year    Christina Hart MD  Staff Physician  Endocrinology and Metabolism  License: [id number]

## 2020-07-29 ENCOUNTER — PRE VISIT (OUTPATIENT)
Dept: ENDOCRINOLOGY | Facility: CLINIC | Age: 35
End: 2020-07-29

## 2020-07-29 ENCOUNTER — VIRTUAL VISIT (OUTPATIENT)
Dept: ENDOCRINOLOGY | Facility: CLINIC | Age: 35
End: 2020-07-29
Attending: OBSTETRICS & GYNECOLOGY
Payer: COMMERCIAL

## 2020-07-29 DIAGNOSIS — E70.1 PHENYLKETONURIA (PKU) (H): ICD-10-CM

## 2020-07-29 DIAGNOSIS — E04.1 THYROID NODULE: Primary | ICD-10-CM

## 2020-07-29 NOTE — LETTER
"7/29/2020       RE: Laila Cueva  [address]  [place] [address]     Dear Colleague,    Thank you for referring your patient, Laila Cueva, to the [facility] ENDOCRINOLOGY at [facility]. Please see a copy of my visit note below.    thyroid nodule  Danyelle Nam CMA    Laila Cueva is a 35 year old female who is being evaluated via a billable video visit.      The patient has been notified of following:     \"This video visit will be conducted via a call between you and your physician/provider. We have found that certain health care needs can be provided without the need for an in-person physical exam.  This service lets us provide the care you need with a video conversation.  If a prescription is necessary we can send it directly to your pharmacy.  If lab work is needed we can place an order for that and you can then stop by our lab to have the test done at a later time.    Video visits are billed at different rates depending on your insurance coverage.  Please reach out to your insurance provider with any questions.    If during the course of the call the physician/provider feels a video visit is not appropriate, you will not be charged for this service.\"    Patient has given verbal consent for Video visit? Yes  How would you like to obtain your AVS? MyChart  If you are dropped from the video visit, the video invite should be resent to: Text to cell phone: cell  Will anyone else be joining your video visit? No        Video-Visit Details    Type of service:  Video Visit    Start: 07/29/2020 03:18 pm   Stop: 07/29/2020 03:48 pm     Originating Location (pt. Location): Home    Distant Location (provider location):  [facility] ENDOCRINOLOGY     Platform used for Video Visit: [facility]                                                                               - Endocrinology Initial Consultation -    Reason for visit/consult:  thyroid nodule    Primary care provider: " Billie Mathews    HPI: A 36 yo female here for the evaluation for her thyroid nodules. She felt lump in her neck and went to OB appointment, where she was recommended to do ultrasound and TSH test.   Ultrasound was done on 2020 showed 4 cystic nodules, largest was [id number] in isthmus, and others are subcentimeter.   Family history of thryoid nodule in her father, but no history of thryoid cancer. Other medical history includes phenylketonuria which she has been taking Kuvan.     Past Medical/Surgical History:  Past Medical History:   Diagnosis Date     Atypical PKU (H)     since birth, manage with diet      Raynaud phenomenon     affects fingers and toes     Von Willebrand disease (H)     affects mainly menses     Von Willebrand disease (H)      Past Surgical History:   Procedure Laterality Date     BIOPSY OF SKIN LESION      2 benign nevus removed      SECTION N/A 2017    Procedure:  SECTION;  Surgeon: Ana Maria Israel MD;  [facility]      SECTION N/A 2019    Procedure: Repeat  Section;  Surgeon: Erika De La Paz MD;  [facility]     HC TOOTH EXTRACTION W/FORCEP         Allergies:  Allergies   Allergen Reactions     Aspartame      Nuts Other (See Comments)       Current Medications   Current Outpatient Medications   Medication     Ferrous Sulfate (IRON SUPPLEMENT PO)     sapropterin dihydrochloride (KUVAN) 100 MG solu-tablet     albuterol (PROAIR HFA/PROVENTIL HFA/VENTOLIN HFA) 108 (90 Base) MCG/ACT inhaler     Prenatal Vit-Fe Fumarate-FA (PRENATAL MULTIVITAMIN  PLUS IRON) 27-0.8 MG TABS     No current facility-administered medications for this visit.        Family History:  Family History   Problem Relation Age of Onset     Anemia Mother      Myocardial Infarction Father 64        alive     Hypertension Father      Melanoma Maternal Grandmother          from another cancer type     Anemia Maternal Grandmother        Social  History:  Social History     Tobacco Use     Smoking status: Never Smoker     Smokeless tobacco: Never Used   Substance Use Topics     Alcohol use: No     Alcohol/week: 1.0 - 3.0 standard drinks     Types: 1 - 3 Standard drinks or equivalent per week       ROS:  Full review of systems taken with the help of the intake sheet. Otherwise a complete 14 point review of systems was taken and is negative unless stated in the history above.        Physical Exam:   Vitals: There were no vitals taken for this visit.  BMI= There is no height or weight on file to calculate BMI.   General: well appearing, no acute distress, pleasant and conversant,   Mental Status/neuro: alert and oriented  Face: symmetrical, normal facial color  Eyes: anicteric, no proptosis or lid lag  Resp: no acute distress        Labs : I reviewed data from epic and extract and summarize the pertinent data here.   Lab Results   Component Value Date     08/10/2015      Lab Results   Component Value Date    POTASSIUM 3.8 08/10/2015     Lab Results   Component Value Date    CHLORIDE 107 08/10/2015     Lab Results   Component Value Date    MIGUE 9.2 08/10/2015     Lab Results   Component Value Date    CO2 24 08/10/2015     Lab Results   Component Value Date    BUN 6 08/10/2015     Lab Results   Component Value Date    CR 0.87 08/10/2015     Lab Results   Component Value Date    GLC 84 08/10/2015     Lab Results   Component Value Date    TSH 1.48 07/08/2020     No results found for: T4  No results found for: A1C    No results found for: IGF1  No results found for: LH    Thyroid ultrasoud: I personally reviewed the original images and explained and showed to the patient today.     Exam: US THYROID, 7/22/2020 4:15 PM     Indication: lump on thyroid; Lump in thyroid     Comparison: None     Findings:   Right thyroid lobe measures 5.7 x 1.5 x 1.5 cm and the left thyroid  lobe measures 5.6 x 1.6 x 0.9 cm. The isthmus measures 0.2 cm.     Thyroid parenchyma is  normal in echogenicity and echotexture. Within  the right thyroid lobe there is a peripheral 0.3 x 0.3 x 0.1 cm  anechoic cyst. No abnormal blood flow or calcification.     Within the left thyroid lobe there is a 0.7 x 0.7 x 0.4 cm spongiform  nodule which is well-defined and without microcalcification or  internal blood flow. In the inferior left thyroid lobe there is a 7 x  5 x 3 mm predominantly cystic nodule, also without internal blood flow  or calcification.     Along the superior margin of the isthmus there is a 1.7 x 1.5 x 1.4 cm  predominantly anechoic nodule, but with a mural soft tissue component.  No abnormal blood flow or identified calcification.                                                                      Impression: Majority of the thyroid nodules are benign or not  suspicious using TIRADS criteria. The larger lesion in the isthmus is  predominantly anechoic, but contains a soft tissue mural nodule.  Consider follow-up to ensure stability.    Assessment and Plan  35 year old female with thyroid nodules    4 cystic nodules, smooth margin, small cellular compornents seen. I did not see any highly suspicious findings of thyroid cancer, we will follow up in 1 year,     - repeat thyroid ultrasound in 1 year    - TSH, free T4, TPO in 1 month    - if patient notices to growing nodule, she will contact us and we will set up ultrasound earlier.     RTC with me in 1 year    Christina Hart MD  Staff Physician  Endocrinology and Metabolism  License: [id number]

## 2020-08-05 ENCOUNTER — TELEPHONE (OUTPATIENT)
Dept: NUTRITION | Facility: CLINIC | Age: 35
End: 2020-08-05

## 2020-08-05 LAB
PHE SERPL-MCNC: 2.8 MG/DL (ref 0.5–1.6)
TYROSINE SERPL-MCNC: 0.9 MG/DL (ref 0.6–2.4)

## 2020-09-09 ENCOUNTER — TELEPHONE (OUTPATIENT)
Dept: CONSULT | Facility: CLINIC | Age: 35
End: 2020-09-09

## 2020-09-16 NOTE — TELEPHONE ENCOUNTER
Prior Authorization Approval    Authorization Effective Date: 8/15/2020  Authorization Expiration Date: 9/15/2021  Medication: KUVAN PA approved  Approved Dose/Quantity: UD  Reference #: [id number]   Insurance Company: [employer] - Phone [phone] Fax [phone]  Expected CoPay:       CoPay Card Available:      Foundation Assistance Needed:    Which Pharmacy is filling the prescription (Not needed for infusion/clinic administered): [facility] - [place] [address] [address]  Pharmacy Notified: Yes  Patient Notified:

## 2020-09-29 DIAGNOSIS — E70.1 PHENYLKETONURIA (PKU) (H): Primary | ICD-10-CM

## 2020-11-14 ENCOUNTER — HEALTH MAINTENANCE LETTER (OUTPATIENT)
Age: 35
End: 2020-11-14

## 2021-01-15 ENCOUNTER — HEALTH MAINTENANCE LETTER (OUTPATIENT)
Age: 36
End: 2021-01-15

## 2021-02-02 ENCOUNTER — TELEPHONE (OUTPATIENT)
Dept: CONSULT | Facility: CLINIC | Age: 36
End: 2021-02-02

## 2021-02-02 NOTE — TELEPHONE ENCOUNTER
PRIOR AUTHORIZATION DENIED    Medication: Kuvan - PA Denied    Denial Date: 2/2/2021    Denial Rational: BASELINE LEVELS OF PHENYLALANINE MUST BE ABOUT 600MICROMOL/L (10MG/DL) AND THE REQUESTED QTY IS MORE THAN THE HIGHEST DOSE RECOMMENDED BY THE FDA     Appeal Information: [phone]

## 2021-02-02 NOTE — TELEPHONE ENCOUNTER
PA Initiation    Medication: Kuvan - PA Initated   Insurance Company: [employer] [place] - Phone [phone] Fax [phone]  Pharmacy Filling the Rx: [facility] - [place], [place] - [facility] [address]  Filling Pharmacy Phone: [phone]  Filling Pharmacy Fax: [phone]  Start Date: 2/2/2021    [facility] Prior Authorization Team   Phone: [phone]  Fax: [phone]

## 2021-02-04 DIAGNOSIS — E70.1 PHENYLKETONURIA (PKU) (H): Primary | ICD-10-CM

## 2021-02-04 NOTE — TELEPHONE ENCOUNTER
PA Initiation    Medication: Kuvan - PA Pending  Insurance Company: [employer] [place] - Phone [phone] Fax [phone]  Pharmacy Filling the Rx: [facility] - [place], [place] - [facility] [address]  Filling Pharmacy Phone: [phone]  Filling Pharmacy Fax: [phone]  Start Date: 2/2/2021            Spoke with patient regarding denial because of the weight we will need to decrease her dose and new dose is 1300mg.  Patient stated she was already taking 1300mg dose.  Informed the PA will be submitted and the new rx will be sent to the pharmacy.   Hypotonic euvolemic hyponatremia, consistent with SIADH 2/2 to acute lung pathology; need to exclude underlying malignancy.  - fluid restriction to 1.0 L per day   - adding Salt Tabs 2g Q8  - monitor BMP

## 2021-02-08 NOTE — TELEPHONE ENCOUNTER
Plan requesting additional information, alexis PEREZ He will write letter to send to plan with additional information.    Fax to [employer] once complete fax# [phone]

## 2021-02-18 NOTE — TELEPHONE ENCOUNTER
Called plan to inform patient has been on therapy since 2015 and doing well which is why the phe levels are normal.  Per clinical review team denied because baseline phe levels are not above 600 micromol/L.    Informed there is an option to request a peer to peer by calling  [phone].    Also requested denial letter to be faxed to our secure fax [phone]   Feeding problem of , unspecified feeding problem Failure to thrive in

## 2021-02-24 ENCOUNTER — TRANSFERRED RECORDS (OUTPATIENT)
Dept: HEALTH INFORMATION MANAGEMENT | Facility: CLINIC | Age: 36
End: 2021-02-24

## 2021-03-01 NOTE — TELEPHONE ENCOUNTER
PRIOR AUTHORIZATION DENIED    Medication: Kuvan - PA Denied    Denial Date: 2/26/2021    Denial Rational:     Appeal Information:Phone:

## 2021-03-08 NOTE — TELEPHONE ENCOUNTER
Medication Appeal Initiation    We have initiated an appeal for the requested medication:  Medication: Kuvan - Appeal  Appeal Start Date:  3/8/2021  Insurance Company: [employer] [place] - Phone [phone] Fax [phone]  Comments:  Faxed to [phone]

## 2021-03-21 ENCOUNTER — OFFICE VISIT (OUTPATIENT)
Dept: URGENT CARE | Facility: URGENT CARE | Age: 36
End: 2021-03-21
Payer: COMMERCIAL

## 2021-03-21 ENCOUNTER — NURSE TRIAGE (OUTPATIENT)
Dept: NURSING | Facility: CLINIC | Age: 36
End: 2021-03-21

## 2021-03-21 VITALS
HEART RATE: 96 BPM | SYSTOLIC BLOOD PRESSURE: 110 MMHG | RESPIRATION RATE: 16 BRPM | HEIGHT: 63 IN | TEMPERATURE: 98.7 F | BODY MASS INDEX: 24.8 KG/M2 | OXYGEN SATURATION: 98 % | DIASTOLIC BLOOD PRESSURE: 70 MMHG | WEIGHT: 140 LBS

## 2021-03-21 DIAGNOSIS — R05.9 COUGH: ICD-10-CM

## 2021-03-21 DIAGNOSIS — R07.0 THROAT PAIN: Primary | ICD-10-CM

## 2021-03-21 DIAGNOSIS — J05.0 CROUP: ICD-10-CM

## 2021-03-21 LAB
DEPRECATED S PYO AG THROAT QL EIA: NEGATIVE
FLUAV+FLUBV AG SPEC QL: NEGATIVE
FLUAV+FLUBV AG SPEC QL: NEGATIVE
SPECIMEN SOURCE: NORMAL
SPECIMEN SOURCE: NORMAL

## 2021-03-21 PROCEDURE — 99N1174 PR STATISTIC STREP A RAPID: Performed by: PHYSICIAN ASSISTANT

## 2021-03-21 PROCEDURE — U0005 INFEC AGEN DETEC AMPLI PROBE: HCPCS | Performed by: FAMILY MEDICINE

## 2021-03-21 PROCEDURE — U0003 INFECTIOUS AGENT DETECTION BY NUCLEIC ACID (DNA OR RNA); SEVERE ACUTE RESPIRATORY SYNDROME CORONAVIRUS 2 (SARS-COV-2) (CORONAVIRUS DISEASE [COVID-19]), AMPLIFIED PROBE TECHNIQUE, MAKING USE OF HIGH THROUGHPUT TECHNOLOGIES AS DESCRIBED BY CMS-2020-01-R: HCPCS | Performed by: FAMILY MEDICINE

## 2021-03-21 PROCEDURE — 99213 OFFICE O/P EST LOW 20 MIN: CPT | Performed by: FAMILY MEDICINE

## 2021-03-21 PROCEDURE — 87804 INFLUENZA ASSAY W/OPTIC: CPT | Performed by: FAMILY MEDICINE

## 2021-03-21 PROCEDURE — 87651 STREP A DNA AMP PROBE: CPT | Performed by: PHYSICIAN ASSISTANT

## 2021-03-21 RX ORDER — DEXAMETHASONE 6 MG/1
16 TABLET ORAL ONCE
Qty: 3 TABLET | Refills: 0 | Status: SHIPPED | OUTPATIENT
Start: 2021-03-21 | End: 2022-03-08

## 2021-03-21 ASSESSMENT — MIFFLIN-ST. JEOR: SCORE: 1309.17

## 2021-03-21 NOTE — LETTER
[facility] [place] [address] [place] [address]  Phone: [phone]    March 21, 2021      RE:  Laila Cueva  [address] [place] [address]          To whom it may concern:    RE: Laila Cueva    Patient was seen and treated today at our clinic.    Please excuse Laila from work until 2/24/2021 due to medical illness.  Thank you.      Sincerely,        Mitch Watson MD

## 2021-03-21 NOTE — TELEPHONE ENCOUNTER
Triage Call:Patient is calling stating she started with a sore throat on Friday and it has developed into a harsh cough. Runny nose. SOB with activity. Temp 99.3. Symptoms worsen when sleeping or laying down. Per protocol patient was advised to be seen in the ED/UC. Patient stated understanding and will go to the [facility] now.     COVID 19 Nurse Triage Plan/Patient Instructions    Please be aware that novel coronavirus (COVID-19) may be circulating in the community. If you develop symptoms such as fever, cough, or SOB or if you have concerns about the presence of another infection including coronavirus (COVID-19), please contact your health care provider or visit [email or web address].     Disposition/Instructions    In-Person Visit with provider recommended. Reference Visit Selection Guide.    Thank you for taking steps to prevent the spread of this virus.  o Limit your contact with others.  o Wear a simple mask to cover your cough.  o Wash your hands well and often.    Resources    [facility]: About COVID-19: [email or web address]    CDC: What to Do If You're Sick: www.cdc.gov/coronavirus/2019-ncov/about/steps-when-sick.html    CDC: Ending Home Isolation: www.cdc.gov/coronavirus/2019-ncov/hcp/disposition-in-home-patients.html     CDC: Caring for Someone: www.cdc.gov/coronavirus/2019-ncov/if-you-are-sick/care-for-someone.html     [facility]: Interim Guidance for Hospital Discharge to Home: www.[email or web address]/diseases/coronavirus/hcp/hospdischarge.pdf    [facility] clinical trials (COVID-19 research studies): [email or web address]-[email or web address]-trials     Below are the COVID-19 hotlines at the [facility] ([facility]). Interpreters are available.   o For health questions: Call 013-981-4601 or 1-455.995.3346 (7 a.m. to 7 p.m.)  o For questions about schools and childcare: Call 561-411-6660 or 1-167.460.5298 (7 a.m. to 7 p.m.)     Janey Luciano, RN Nursing  Advisor 3/21/2021 5:54 PM     Reason for Disposition    MILD difficulty breathing (e.g., minimal/no SOB at rest, SOB with walking, pulse <100)    Additional Information    Negative: SEVERE difficulty breathing (e.g., struggling for each breath, speaks in single words)    Negative: Difficult to awaken or acting confused (e.g., disoriented, slurred speech)    Negative: Bluish (or gray) lips or face now    Negative: Shock suspected (e.g., cold/pale/clammy skin, too weak to stand, low BP, rapid pulse)    Negative: Sounds like a life-threatening emergency to the triager    Negative: [1] COVID-19 exposure AND [2] no symptoms    Negative: [1] Lives with someone known to have influenza (flu test positive) AND [2] flu-like symptoms (e.g., cough, runny nose, sore throat, SOB; with or without fever)    Negative: [1] Adult with possible COVID-19 symptoms AND [2] triager concerned about severity of symptoms or other causes    Negative: COVID-19 vaccine reaction suspected (e.g., fever, headache, muscle aches) occurring during days 1-3 after getting vaccine    Negative: COVID-19 vaccine, questions about    Negative: COVID-19 and breastfeeding, questions about    Negative: SEVERE or constant chest pain or pressure (Exception: mild central chest pain, present only when coughing)    Negative: MODERATE difficulty breathing (e.g., speaks in phrases, SOB even at rest, pulse 100-120)    Negative: [1] Headache AND [2] stiff neck (can't touch chin to chest)    Protocols used: CORONAVIRUS (COVID-19) DIAGNOSED OR [id number] 1.3

## 2021-03-22 LAB
LABORATORY COMMENT REPORT: NORMAL
SARS-COV-2 RNA RESP QL NAA+PROBE: NEGATIVE
SARS-COV-2 RNA RESP QL NAA+PROBE: NORMAL
SPECIMEN SOURCE: NORMAL
STREP GROUP A PCR: NOT DETECTED

## 2021-03-22 NOTE — PROGRESS NOTES
"SUBJECTIVE:  Laila Cueva, a 35 year old female scheduled an appointment to discuss the following issues:     Throat pain  Cough  Croup    Medical, social, surgical, and family histories reviewed.     Urgent Care   URI (coughing and short of breath, sore throat and low grade fever. Sick since Friday)  Pt has been ill X 2 days with barky cough, mild SOBOE, sore throat and low grade fever up to 99.3F, hoarse voice.    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  Not pregnant.  Pt had COVID-19 in Dec 2020.    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    Pt has used albuterol and fluticasone inhalers 1 year ago, but no hx of asthma diagnosed.    She currently has sore throat with odynophagia; just had Tylenol at 5pm.    ROS:  See HPI.  No nausea/vomiting. No chest pain; mild SOB.  No BM/urine problems.  No dizziness or syncope.      OBJECTIVE:  /70   Pulse 96   Temp 98.7  F (37.1  C) (Oral)   Resp 16   Ht 1.6 m (5' 3\")   Wt 63.5 kg (140 lb)   LMP 03/18/2021   SpO2 98%   Breastfeeding No   BMI 24.80 kg/m    EXAM:  GENERAL APPEARANCE:  alert and mild distress; afebrile; no cyanosis or accessory muscle use; hoarse voice; no tripoding; airways patent  EYES: Eyes grossly normal to inspection, PERRL and conjunctivae and sclerae normal  HENT: ear canals and TM's normal and nose and mouth without ulcers or lesions; erythematous throat but no exudate  NECK: no adenopathy, no asymmetry, masses, or scars and neck normal to palpation  RESP: lungs clear to auscultation - no rales, rhonchi or wheezes  CV: regular rates and rhythm, normal S1 S2, no S3 or S4 and no murmur, click or rub  LYMPHATICS: no cervical adenopathy  ABDOMEN: soft, nontender, without hepatosplenomegaly or masses and bowel sounds normal  MS: extremities normal- no gross deformities noted  SKIN: no suspicious lesions or rashes  NEURO: " Normal strength and tone, mentation intact and speech normal    ASSESSMENT/PLAN:  (R07.0) Throat pain  (primary encounter diagnosis)  Comment: Strep negative; COVID-19 pending; Influenza A/B negative  Plan: Streptococcus A Rapid Scr w Reflx to PCR, Group        A Streptococcus PCR Throat Swab, SARS-CoV-2         COVID-19 Virus (Coronavirus) by PCR          (R05) Cough  Comment: as above  Plan: Influenza A/B antigen, Symptomatic COVID-19         Virus (Coronavirus) by PCR       (J05.0) Croup  Comment: likely para-influenza virus  Plan: dexamethasone (DECADRON) 6 MG tablet    Care instructions given.    Pt to f/up PCP within 1 week if no improvement or worsening.  Warning signs and symptoms explained.

## 2021-03-22 NOTE — PATIENT INSTRUCTIONS
Patient Education     Viral Bronchitis (Adult)    You have a viral bronchitis. Bronchitis is inflammation and swelling of the lining of the lungs. This is often caused by an infection. Symptoms include a dry, hacking cough that is worse at night. The cough may bring up yellow-green mucus. You may also feel short of breath or wheeze. Other symptoms may include tiredness, chest discomfort, and chills.  Bronchitis that is caused by a virus is not treated with antibiotics. Instead, medicines may be given to help relieve symptoms. Symptoms can last up to 2 weeks, although the cough may last much longer.  This illness is contagious during the first few days and is spread through the air by coughing and sneezing, or by direct contact (touching the sick person and then touching your own eyes, nose, or mouth).  Most viral illnesses resolve within 10 to 14 days with rest and simple home remedies, although they may sometimes last for several weeks.  Home care    If symptoms are severe, rest at home for the first 2 to 3 days. When you go back to your usual activities, don't let yourself get too tired.    Do not smoke. Also avoid being exposed to secondhand smoke.    You may use over-the-counter medicine to control fever or pain, unless another pain medicine was prescribed. If you have chronic liver or kidney disease or have ever had a stomach ulcer or gastrointestinal bleeding, talk with your healthcare provider before using these medicines. Also talk to your provider if you are taking medicine to prevent blood clots. Aspirin should never be given to anyone younger than 18 years of age who is ill with a viral infection or fever. It may cause severe liver or brain damage.    Your appetite may be poor, so a light diet is fine. Avoid dehydration by drinking 6 to 8 glasses of fluids per day (such as water, soft drinks, sports drinks, juices, tea, or soup). Extra fluids will help loosen secretions in the nose and  lungs.    Over-the-counter cough, cold, and sore-throat medicines will not shorten the length of the illness, but they may help to reduce symptoms. Don't use decongestants if you have high blood pressure.  Follow-up care  Follow up with your healthcare provider, or as advised. If you had an X-ray or ECG (electrocardiogram), a specialist will review it. You will be notified of any new findings that may affect your care.  If you are age 65 or older, or if you have a chronic lung disease or condition that affects your immune system, or you smoke, ask your healthcare provider about getting a pneumococcal vaccine and a yearly flu shot (influenza vaccine).  When to seek medical advice  Call your healthcare provider right away if any of these occur:    Fever of 100.4 F (38 C) or higher, or as directed by your healthcare provider    Coughing up increased amounts of colored sputum    Weakness, drowsiness, headache, facial pain, ear pain, or a stiff neck  Call 911  Call 911 if any of these occur:    Coughing up blood    Worsening weakness, drowsiness, headache, or stiff neck    Trouble breathing, wheezing, or pain with breathing  [employer] last reviewed this educational content on 6/1/2018 2000-2020 [employer]. All rights reserved. This information is not intended as a substitute for professional medical care. Always follow your healthcare professional's instructions.

## 2021-03-30 NOTE — TELEPHONE ENCOUNTER
Spoke with patient she will send in [facility] level for pa/appeal, patient stated she has not filled anywhere since 8/2020 she just did not take regularly because of pregnancy and depression.  She said she just opened her last bottle last week and is nearly out.  She will send in levels and hopefully we can get covered.

## 2021-04-07 DIAGNOSIS — E70.1 PHENYLKETONURIA (PKU) (H): ICD-10-CM

## 2021-04-07 PROCEDURE — 84510 ASSAY OF TYROSINE: CPT | Performed by: PEDIATRICS

## 2021-04-13 ENCOUNTER — TELEPHONE (OUTPATIENT)
Dept: CONSULT | Facility: CLINIC | Age: 36
End: 2021-04-13

## 2021-04-13 NOTE — TELEPHONE ENCOUNTER
I left Laila a voice message about setting a follow-up virtual visit with Dr. Monroy for this following year. Dr. Monroy would like to do a virtual visit follow-up on how the patient s progress is coming along. Please feel free to contact Dr. Monroy s coordinator Ana Maria at [phone] or the Genetics appointment scheduling at [phone] and we ll be happy to help coordinate an appointment with the patient.     Ana Maria Chau

## 2021-04-20 ENCOUNTER — TELEPHONE (OUTPATIENT)
Dept: NUTRITION | Facility: CLINIC | Age: 36
End: 2021-04-20

## 2021-04-20 LAB
PHE SERPL-MCNC: 7.9 MG/DL (ref 0.5–1.6)
TYROSINE SERPL-MCNC: 1.2 MG/DL (ref 0.6–2.4)

## 2021-04-20 NOTE — TELEPHONE ENCOUNTER
PA Initiation    Medication: Kuvan - PA Pending  Insurance Company: [employer] [place] - Phone [phone] Fax [phone]  Pharmacy Filling the Rx: [facility] - [place], [place] - [facility] [address]  Filling Pharmacy Phone: [phone]  Filling Pharmacy Fax: [phone]  Start Date: 4/20/2021    Attempting to initiate new PA stating this is a renewal as patient has been on therapy for years.

## 2021-04-20 NOTE — TELEPHONE ENCOUNTER
MEDICATION PEER TO PEER DENIED    Medication: Kuvan - Denied    Denial Date:  03/27/2021    Denial Rational: SEE LETTER    Second Level Appeal Information:     Spoke with [employer] was informed the peer to peer was completed on 3/25 and was upheld.  Called [facility] to confirm status of appeal sent 3/8/2021.  Was informed [facility] upheld the decision to deny coverage.

## 2021-04-20 NOTE — TELEPHONE ENCOUNTER
Informed [employer] this PA is for a renewal.  Also spoke with patient to inform we have a new request under review.

## 2021-04-21 NOTE — PROGRESS NOTES
PHE result collected 4/7/21 given to patient = 7.9 mg/dL.  Noted high as patient having issues with insurance coverage so not taking full doses of Kuvan.  Pharmacy liaison assisting patient with coverage determination.

## 2021-04-22 NOTE — TELEPHONE ENCOUNTER
Additional information needed (Sent to Eduard PEREZ In clinic to see if there is any additional information we should send for this PA request)

## 2021-04-22 NOTE — TELEPHONE ENCOUNTER
PRIOR AUTHORIZATION DENIED    Medication: Kuvan - Denied    Denial Date: 4/21/2021    Denial Rational: see below    Appeal Information: see below

## 2021-04-27 NOTE — TELEPHONE ENCOUNTER
Spoke with patient informed her I am waiting for current denial letter from [employer] to know next steps for appeal and will talk to Eduard to go over next steps also.     We will follow up with her once we have plan, patient did state she is out of medication.      Left message for Jeffy at [employer] to inform ERNESTINA mcmahan and see if he can assist with support for this patient.

## 2021-04-27 NOTE — TELEPHONE ENCOUNTER
Called [employer] [place] [phone] to verify next steps, was informed the appeal from 3/8/2021 was denied back on 4/20/2021 but never received a denial letter.  When calling today the line is stating will need to call back later they must be having some type of technical difficulties.      Will follow up with YOSVANY hobson [place] at a later time, also emailed Eduard BRITT in clinic to inform [facility] to see if [facility] can help, they may need a new PEF to start process.  Eduard is out of office today will follow up with Eduard at later time also.    -Left message for patient to inform status

## 2021-05-04 NOTE — TELEPHONE ENCOUNTER
MEDICATION APPEAL APPROVED    Medication: Kuvan - Appeal Approved  Authorization Effective Date: 2/2/2021  Authorization Expiration Date: 4/26/2022  Approved Dose/Quantity: 100mg tablets/ 390 for 30 days  Reference #: [id number]   Insurance Company:  John  Expected CoPay:     $50  CoPay Card Available:      Foundation Assistance Needed:    Which Pharmacy is filling the prescription (Not needed for infusion/clinic administered): [facility] - [place], [place] - [address]

## 2021-06-20 NOTE — LETTER
Letter by Iza Vazquez RN at      Author: Iza Vazquez RN Service: -- Author Type: --    Filed:  Encounter Date: 6/27/2020 Status: (Other)       6/27/2020        Laila Benites[address] [place] [facility] [address]    This letter provides a written record that you were tested for COVID-19 on 6/25/20.     Your result was negative. This means that we didnt find the virus that causes COVID-19 in your sample. A test may show negative when you do actually have the virus. This can happen when the virus is in the early stages of infection, before you feel illness symptoms.    If you have symptoms   Stay home and away from others (self-isolate) until you meet ALL of the guidelines below:    Youve had no fever--and no medicine that reduces fever--for 3 full days (72 hours). And ?    Your other symptoms have gotten better. For example, your cough or breathing has improved. And?    At least 10 days have passed since your symptoms started.    During this time:    Stay home. Dont go to work, school or anywhere else.     Stay in your own room, including for meals. Use your own bathroom if you can.    Stay away from others in your home. No hugging, kissing or shaking hands. No visitors.    Clean high touch surfaces often (doorknobs, counters, handles, etc.). Use a household cleaning spray or wipes. You can find a full list on the EPA website at www.epa.gov/pesticide-registration/list-n-disinfectants-use-against-sars-cov-2.    Cover your mouth and nose with a mask, tissue or washcloth to avoid spreading germs.    Wash your hands and face often with soap and water.    Going back to work  Check with your employer for any guidelines to follow for going back to work.    Employers: This document serves as formal notice that your employee tested negative for COVID-19, as of the testing date shown above.

## 2021-07-19 ENCOUNTER — APPOINTMENT (OUTPATIENT)
Dept: URGENT CARE | Facility: CLINIC | Age: 36
End: 2021-07-19
Payer: COMMERCIAL

## 2021-07-23 ENCOUNTER — E-VISIT (OUTPATIENT)
Dept: URGENT CARE | Facility: CLINIC | Age: 36
End: 2021-07-23
Payer: COMMERCIAL

## 2021-07-23 DIAGNOSIS — B30.9 VIRAL CONJUNCTIVITIS: Primary | ICD-10-CM

## 2021-07-23 PROCEDURE — 99421 OL DIG E/M SVC 5-10 MIN: CPT | Performed by: NURSE PRACTITIONER

## 2021-07-23 NOTE — PATIENT INSTRUCTIONS
Thank you for choosing us for your care. Based on your symptoms and length of illness, I do not think that you need a prescription at this time.  Please follow the care advise I ve provided and use the over the counter medications to help relieve your symptoms. View your full visit summary for details by clicking on the link below.     If you re not feeling better within 2-3 days, please respond to this message and we can consider if a prescription is needed.  You can schedule an appointment right here in [facility], or call [phone]  If the visit is for the same symptoms as your eVisit, we ll refund the cost of your eVisit if seen within seven days.      Conjunctivitis, Nonspecific    The membrane that covers the white part of your eye (the conjunctiva) is inflamed. Inflammation happens when your body responds to an injury, allergic reaction, infection, or illness. When these symptoms are accompanied by runny nose and cough the pink eye is usually caused by a virus and will go away on its own. There is no need for antibiotic eye drops.Symptoms of inflammation in the eye may include redness, irritation, itching, swelling, or burning. These symptoms should go away within the next 72 hours.   Home care    Put a cold pack on the eye for 20 minutes at a time. This will reduce pain. To make a cold pack, put ice cubes in a plastic bag that seals at the top. Wrap the bag in a clean, thin towel or cloth.    Artificial tears may be helpful to reduce irritation or redness. These should be used 3 to 4 times a day.    You may use acetaminophen or ibuprofen to control pain, unless another medicine was prescribed. If you have chronic liver or kidney disease, talk with your healthcare provider before using these medicines. Also talk with your provider if you have ever had a stomach ulcer or gastrointestinal bleeding.    Follow-up care  Follow up with your healthcare provider, or as advised.  When to seek medical advice  Call  your healthcare provider right away if any of these occur:    Eyelid swells more    Eye pain gets worse    Redness or drainage from the eye gets worse    Blurry vision gets worse or you have increased sensitivity to light    Normal vision does not return within 24 to 48 hours  Maida last reviewed this educational content on 2/1/2020 2000-2021 [employer]. All rights reserved. This information is not intended as a substitute for professional medical care. Always follow your healthcare professional's instructions.

## 2021-07-27 ENCOUNTER — VIRTUAL VISIT (OUTPATIENT)
Dept: PEDIATRICS | Facility: CLINIC | Age: 36
End: 2021-07-27
Attending: PEDIATRICS
Payer: COMMERCIAL

## 2021-07-27 ENCOUNTER — VIRTUAL VISIT (OUTPATIENT)
Dept: NUTRITION | Facility: CLINIC | Age: 36
End: 2021-07-27
Attending: DIETITIAN, REGISTERED
Payer: COMMERCIAL

## 2021-07-27 DIAGNOSIS — E70.1 PHENYLKETONURIA (PKU) (H): ICD-10-CM

## 2021-07-27 DIAGNOSIS — E70.1 PHENYLKETONURIA (PKU) (H): Primary | ICD-10-CM

## 2021-07-27 PROCEDURE — 97803 MED NUTRITION INDIV SUBSEQ: CPT | Mod: TEL | Performed by: DIETITIAN, REGISTERED

## 2021-07-27 PROCEDURE — 99214 OFFICE O/P EST MOD 30 MIN: CPT | Mod: 95 | Performed by: PEDIATRICS

## 2021-07-27 NOTE — NURSING NOTE
Chief Complaint   Patient presents with     Follow Up     PKU     There were no vitals filed for this visit.  Irena Bee LPN  July 27, 2021

## 2021-07-27 NOTE — LETTER
"  7/27/2021      RE: Laila Cueva  [address] [place] [address]       CLINICAL NUTRITION SERVICES - PEDIATRIC ASSESSMENT NOTE     REASON FOR ASSESSMENT  Laila Cueva is a 36 year old female seen by the dietitian for consult regarding mild PKU.     ANTHROPOMETRICS  Height: 160.5 cm or 63.2 in  Weight: (verbal per patient): 66-68 kg or 145-150 lbs  BMI: 25.6  IBW: 52 kg  Usual weight pre-pregnancies: 57 kg or 125 lbs     NUTRITION HISTORY  Patient follows a low protein diet of 30-45 grams/day.     Breakfast (10 gm or less):   -2 Eggo waffles with 1-2 Tbsp peanut butter  -1-2 eggs scrambled + tea or juice     Lunch (15 gm):   -previously often skipped lunch but taking more lunch breaks at work. Has been utilizing the \"salad kits\" from [employer]    Dinner: (<20 gm)  -has been eating out frequently (~50% of time); last night was [employer] (white rice, 1/2 portion sesame chicken and stir medley veggies   -if eating at home will do pasta/bread with 4-5 pieces meat    Snacks:  -chips, rice krispies, H[employer]'s kisses    Drinks:  -water, tea, soda 1-2 times/week     -Patient is interested in losing weight.  She feels she gained it after her pregnancies and has yet to lose it.  Looking for suggestions.       PKU FORMULA  Phenylade Essentials (strawberry) - 1-2 packets/day     This would supply 314 kcals/day (5 kcal/kg), 20 grams protein (0.3 g/kg), 480 international units Vitamin D, 658 mg calcium, and 8.8 mg iron.      Total protein + PE (foods + formula) = 50-65 gm (0.75-1 g/kg).  Total PHE 2823-8297 mg/day.     LABS  Labs reviewed;         PHE     TYR  4/17/21     7.9 1.2  7/27/20 2.8 0.9  8/1/19  2.2 0.7  Avg  4.3 0.9            MEDICATIONS  Medications reviewed;   -Kuvan daily  -5000 international units Vitamin D daily  -Fe+ daily (1/2 65 mg tablet)                ASSESSED NUTRITION NEEDS:  Estimated Energy Needs: 25-30 kcal/kg (8201-2989 kcals/day)  Estimated Protein Needs: RDA for age = 0.8 " g/kg, optimal range for age/PKU: 0.8-1.2 g/kg  Micronutrient Needs: DRI/age: 600 international units Vitamin D, 1000 mg calcium, 18 mg iron daily                 NUTRITION DIAGNOSIS:  Impaired nutrient utilization related to diagnosis of mild PKU as evidenced by elevated PHE levels.     INTERVENTIONS  Nutrition Prescription  Meet 100% estimated kcal, protein needs through moderate-protein restricted diet    Nutrition Education:   Provided education on goals for nutrition for PKU.     Reviewed weight/changes, intakes, and most recent levels.  -Reviewed role of formula as source of non-phe containing protein that can help with satiety and weight loss.  Discussed again the possible benefits of GMP based formula over amino acid based; will retry options PKU Sphere liquid and Glytactin Lite 15 (patient prefers liquid for transporting).    -Patient would like ideas for meals in appropriate calorie and target range; RD will put together 2-3 options for each meal as examples for patient to build off of.  -Reviewed benefit of tracking intake in some way (paper, nutrition apps) as this helps be more aware of choices throughout each day  -Recommend more frequent blood levels for adequate monitoring (current average 1 per year over past 3 years)     Goals  1. Weight loss of 1-2 lbs/week, decrease in BMI (<24)  -goal not met  2. Meet >85% estimated nutrition needs through low/moderate protein diet  -goal met  3. PHE levels 2-6 mg/dL  -unable to fully assess, average of 1 blood level per year    FOLLOW UP/MONITORING  Energy Intake  Macronutrient intake  Anthropometric measurements     Gay Lopez RD, LD    Time spent with patient: 15 minutes      Gay Lopez RD

## 2021-07-27 NOTE — PATIENT INSTRUCTIONS
"Pediatric Metabolism/PKU Clinic  [facility]  Pediatric Specialty Clinic ([facility])      Formula   We did not make any changes to your formula today.   We will review the lab results and call you with our recommendations.  *Do not make any formula changes without first speaking to your dietician or doctor.       Medications   We did not make any changes to your medications today. We will review the lab results and call you with our recommendations.  *Do not make any medication changes without first speaking to your doctor.  **Please contact us at least one week in advance during regular business hours if you are about to run out of formula or medication       Emergency & Sick Calls   Keep your emergency letter with your child at all times  (at their school, in your vehicles, purse/bag and home, etc).  Consider making a medical alert bracelet.    If your child is unresponsive or has other life threatening medical emergency YOU SHOULD CALL 911.     If your child is NOT ACTING NORMALLY such as: confused or sleepier than normal, having nausea or vomiting, not tolerating their formula or medications, breathing faster than normal, has a fever, diarrhea, or other parental concern CALL US IMMEDIATELY.     ? Call [phone] and ask the  to \"page Genetic Metabolic Physician on-call\"   ? If no one calls you back within 15 minutes call again.        Helpful Numbers   To schedule appointments:  Pediatric Call Center for [facility]: [phone]  Neuropsychology [phone]  Radiology/ Imaging/ Echocardiogram: [phone]   Services:   [phone]     For questions about medications/ supplies or non-urgent medical concerns:        Janey KENT, RN, PHN  Nurse Care Coordinator               Ph: [phone]        Shara Martin APRN, CNP             Ph: [phone]    For questions about your child's nutrition:  Gay Lopez RD  Ph: [phone]    For " questions about genetic counseling:                  If you have not already done so consider signing up for [employer] by speaking with the person at the  on your way out or go to [email or web address] to sign up online.      You should receive a phone call about your next appointment. If you do not receive this within two weeks of your visit, please call [phone].

## 2021-07-27 NOTE — LETTER
2021      RE: Laila Cueva  [address] [place] [address]       How would you like to obtain your AVS? MyChart  If the video visit is dropped, the invitation should be resent by:  Will anyone else be joining your video visit? Dr. Eduard Pacheco (Clinical Pharmacist), Dr. Toña Winter (Research physician)          Irena Bee LPN    START:    END:              Phenylketonuria (PKU) Clinic  [facility] [address] [facility] [address]  Phone: [phone]  Fax: [phone]  Date: 2021      Patient:  Laila Cueva   :   1985   MRN:     [id number]      Laila Cueva  [address] [place] [address]    Dear Dr. Billie Mathews and  Laila BRIAN ChandraHammad,    CHIEF COMPLAINT:     I had the pleasure of seeing Laila Cueva in the PKU and Maternal PKU Clinic at the [facility] regarding phenylketonuria (PKU). This here for evaluation and treatment.      Since the last visit, there have been no hospitalizations, emergency department visits, or other major changes in medical care.    PAST MEDICAL HISTORY:    These list of past medical problems includes:    Patient Active Problem List   Diagnosis     Acne     CARDIOVASCULAR SCREENING; LDL GOAL LESS THAN 160     Dermatofibroma of left lower leg     Phenylketonuria (PKU) (H)     Von Willebrand disease (H)     Maternal phenylketonuria in third trimester (H)     Intramural leiomyoma of uterus     Since our last visit, Laila has had not hospitalizations or ED visits.  Her phe levels are well controlled with generic Kuvan, obtained from [facility].  She is interested in protein drinks and some nutritional supplementation.  FAMILY HISTORY: A brief family medical history was reviewed.  MEDICATIONS:   Current Outpatient Medications   Medication Sig     dexamethasone (DECADRON) 6 MG tablet Take 2.75 tablets (16.5 mg) by mouth once for 1 dose     Prenatal Vit-Fe Fumarate-FA (PRENATAL  MULTIVITAMIN  PLUS IRON) 27-0.8 MG TABS Take 1 tablet by mouth daily     sapropterin dihydrochloride (KUVAN) 100 MG solu-tablet Take 13 tablets (1,300 mg) by mouth daily     No current facility-administered medications for this visit.     REVIEW OF SYSTEMS: The review of systems negative for new eye, ear, heart, lung, liver, spleen, gastrointestinal, bone, muscle, integumentary, endocrinologic, brain or psychiatric issues except as noted above.  PHYSICAL EXAMINATION:  Demographics: There were no vitals taken for this visit.  General: The patient is oriented to person, place and time at an age-appropriate manner.   HEENT: The facial features are normal and symmetric.   The gaze is conjugate and extraocular motions are full and intact.The pupils are equal, round and reactive to light.  The ears are of normal position and configuration and hearing is grossly normal.  The oropharynx is benign and the tongue protrudes normally without fasciculations.  Neck: The neck is supple with full range of motion  Chest: The chest is of normal configuration.   Heart: The patient appears to be well-perfused and in no apparent distress.  Abdomen: The abdomen appears to be is soft and benign without organomegaly.   Extremities: The extremities are of normal configuration without apparent contractures nor hyperlaxities. Integument: The integument is  of normal appearance without significant changes in pigmentation, birthmarks, or lesions.  Neurologic:  Mental Status Exam:  Alert, awake.     LABORATORY RESULTS: Previous studies showed pathologically elevated blood phenylalanine levels as well as specific PAH mutatons and excluded disorders of biopterin recycling. Laboratory studies from the past year were reviewed.  Results for NATHANIEL ADELIA L (MRN [id number]) as of 7/27/2021 08:59   Ref. Range 7/27/2020 08:45 4/7/2021 07:30   Phenylalanine mg/dl Latest Ref Range: 0.5 - 1.6 mg/dL 2.8 (H) 7.9 (H)   Tyrosine Latest Ref Range: 0.6 - 2.4  "mg/dL 0.9 1.2       ASSESSMENT:  1. Phenylketonuria (PKU)  2. On Kuvan   3. Diet controlled PKU  4. Good control with blood phe levels aiming at levels of 6 mg/dL or lower   PLAN/RECOMMENDATIONS:  1. Determine basic caloric needs with Gay Lopez RD  2. Determine amount of protein needed  3. Establish an exercise routine  4. Continue low phenylalanine diet  5. Continue taking Kuvan, 1,300 mg per day  6. Okay to donate blood and plasma - check with donation center   7. Send blood \"tyrosine and phenylalanine\" levels monthly  8. Clinical pharmacotherapy consult with Eduard Pacheco, Pharm.D.  9. Metabolic PKU Dietician Consult today for regular diet assessment and nutritional management including the patient's prescribed phenylalanine intake.  10. Return to PKU Clinic in 12 months.    FOLLOW-UP PLAN:  If you are returning to clinic to review specific laboratory tests, please call the Genetic Counselor (see phone numbers below) to confirm that we have received all of the results from reference laboratories prior to your appointment. If we have not received all of the test results, please discuss re-scheduling your appointment.    I spent 30 minutes face-to-face with the patient reviewing the chief complaint, past medical history, and obtaining a review of systems as well as doing a physical examination; more than 50% of this time was spent in counseling and education regarding Maternal PKU, the availability of the local patient support group with information on the [place] PKU Foundation available at www.mnpku.org as well as the importance of keeping blood levels at-or-below 6 mg/dl, and to manage this by sending regular blood tests, and consulting with Gay Lopez between visits to achieve this goal.    With warmest regards,     Quan Monroy PhD MD  Medical Director, PKU Clinic  Professor of Pediatrics, Medical School, and  Experimental and Clinical Pharmacology, [employer]    Appointments: " [phone]      Monday mornings: [facility] for Lysosomal Diseases Clinic   Monday afternoons: PKU Clinic, Metabolism Clinic, and Genetics Clinic    Pharmacotherapy Consultant:  Eduard Pacheco, PharmD, Pharmacotherapy for Metabolic Disorders (PIMD): [phone]    Genetic Counselor:  Patricia Ayers MS, [facility] (Genetic test Results): [phone]    Metabolic Dietician:  Gay Lopez, Registered Dietician: [phone]    Advanced [facility]rapies Clinic Scheduler:  Ana Maria Chau, [phone]    Nurse Coordinator, PKU, Metabolism and Genetics:  Nirali Gooden RN, [phone]    Copies:    Dr. Billie Mathews  [address] [facility] [address]    Laila Cueva  [address] [place] [address]

## 2021-07-27 NOTE — PROGRESS NOTES
How would you like to obtain your AVS? MyChart  If the video visit is dropped, the invitation should be resent by:  Will anyone else be joining your video visit? Dr. Eduard Pacheco (Clinical Pharmacist), Dr. Toña Winter (Research physician)          Irena Bee LPN    START:    END:              Phenylketonuria (PKU) Clinic  [facility] [address]  [address] [place] [address]  Phone: [phone]  Fax: [phone]  Date: 2021      Patient:  Laila Cueva   :   1985   MRN:     [id number]      Laila Cueva  [address] [place] [address]    Dear Dr. Billie Mathews and  Laila Cueva,    CHIEF COMPLAINT:     I had the pleasure of seeing Laila Cueva in the PKU and Maternal PKU Clinic at the [facility] regarding phenylketonuria (PKU). This here for evaluation and treatment.      Since the last visit, there have been no hospitalizations, emergency department visits, or other major changes in medical care.    PAST MEDICAL HISTORY:    These list of past medical problems includes:    Patient Active Problem List   Diagnosis     Acne     CARDIOVASCULAR SCREENING; LDL GOAL LESS THAN 160     Dermatofibroma of left lower leg     Phenylketonuria (PKU) (H)     Von Willebrand disease (H)     Maternal phenylketonuria in third trimester (H)     Intramural leiomyoma of uterus     Since our last visit, Laila has had not hospitalizations or ED visits.  Her phe levels are well controlled with generic Kuvan, obtained from [facility].  She is interested in protein drinks and some nutritional supplementation.  FAMILY HISTORY: A brief family medical history was reviewed.  MEDICATIONS:   Current Outpatient Medications   Medication Sig     dexamethasone (DECADRON) 6 MG tablet Take 2.75 tablets (16.5 mg) by mouth once for 1 dose     Prenatal Vit-Fe Fumarate-FA (PRENATAL MULTIVITAMIN  PLUS IRON) 27-0.8 MG TABS Take 1 tablet by mouth daily     sapropterin  dihydrochloride (KUVAN) 100 MG solu-tablet Take 13 tablets (1,300 mg) by mouth daily     No current facility-administered medications for this visit.     REVIEW OF SYSTEMS: The review of systems negative for new eye, ear, heart, lung, liver, spleen, gastrointestinal, bone, muscle, integumentary, endocrinologic, brain or psychiatric issues except as noted above.  PHYSICAL EXAMINATION:  Demographics: There were no vitals taken for this visit.  General: The patient is oriented to person, place and time at an age-appropriate manner.   HEENT: The facial features are normal and symmetric.   The gaze is conjugate and extraocular motions are full and intact.The pupils are equal, round and reactive to light.  The ears are of normal position and configuration and hearing is grossly normal.  The oropharynx is benign and the tongue protrudes normally without fasciculations.  Neck: The neck is supple with full range of motion  Chest: The chest is of normal configuration.   Heart: The patient appears to be well-perfused and in no apparent distress.  Abdomen: The abdomen appears to be is soft and benign without organomegaly.   Extremities: The extremities are of normal configuration without apparent contractures nor hyperlaxities. Integument: The integument is  of normal appearance without significant changes in pigmentation, birthmarks, or lesions.  Neurologic:  Mental Status Exam:  Alert, awake.     LABORATORY RESULTS: Previous studies showed pathologically elevated blood phenylalanine levels as well as specific PAH mutatons and excluded disorders of biopterin recycling. Laboratory studies from the past year were reviewed.  Results for ADELIA ARMSTRONG (MRN [id number]) as of 7/27/2021 08:59   Ref. Range 7/27/2020 08:45 4/7/2021 07:30   Phenylalanine mg/dl Latest Ref Range: 0.5 - 1.6 mg/dL 2.8 (H) 7.9 (H)   Tyrosine Latest Ref Range: 0.6 - 2.4 mg/dL 0.9 1.2       ASSESSMENT:  1. Phenylketonuria (PKU)  2. On Kuvan   3. Diet  "controlled PKU  4. Good control with blood phe levels aiming at levels of 6 mg/dL or lower   PLAN/RECOMMENDATIONS:  1. Determine basic caloric needs with Gay Lopez RD  2. Determine amount of protein needed  3. Establish an exercise routine  4. Continue low phenylalanine diet  5. Continue taking Kuvan, 1,300 mg per day  6. Okay to donate blood and plasma - check with donation center   7. Send blood \"tyrosine and phenylalanine\" levels monthly  8. Clinical pharmacotherapy consult with Eduard Pacheco, Pharm.D.  9. Metabolic PKU Dietician Consult today for regular diet assessment and nutritional management including the patient's prescribed phenylalanine intake.  10. Return to PKU Clinic in 12 months.    FOLLOW-UP PLAN:  If you are returning to clinic to review specific laboratory tests, please call the Genetic Counselor (see phone numbers below) to confirm that we have received all of the results from reference laboratories prior to your appointment. If we have not received all of the test results, please discuss re-scheduling your appointment.    I spent 30 minutes face-to-face with the patient reviewing the chief complaint, past medical history, and obtaining a review of systems as well as doing a physical examination; more than 50% of this time was spent in counseling and education regarding Maternal PKU, the availability of the local patient support group with information on the [place] PKU Foundation available at www.mnpku.org as well as the importance of keeping blood levels at-or-below 6 mg/dl, and to manage this by sending regular blood tests, and consulting with Gay Lopez between visits to achieve this goal.    With warmest regards,     Quan Monroy PhD, MD  Medical Director, PKU Clinic  Professor of Pediatrics, Medical School, and  Experimental and Clinical Pharmacology, [employer]    Appointments: [phone]      Monday mornings: [facility] for Lysosomal Diseases " Clinic   Monday afternoons: PKU Clinic, Metabolism Clinic, and Genetics Clinic    Pharmacotherapy Consultant:  Eduard Pacheco, PharmD, Pharmacotherapy for Metabolic Disorders (PIM[facility]): [phone]    Genetic Counselor:  Patricia Ayers MS, [facility] (Genetic test Results): [phone]    Metabolic Dietician:  Gay Lopez, Registered Dietician: [phone]    [facility] Clinic Scheduler:  Ana Maria Chau, [phone]    Nurse Coordinator, PKU, Metabolism and Genetics:  Nirali Gooden RN, [phone]    Copies:    Dr. Billie Mathews  [address] [facility] [address]    Laila Cueva  [address] [place] [address]

## 2021-08-02 NOTE — PROGRESS NOTES
"CLINICAL NUTRITION SERVICES - PEDIATRIC ASSESSMENT NOTE     REASON FOR ASSESSMENT  Laila Cueva is a 36 year old female seen by the dietitian for consult regarding mild PKU.     ANTHROPOMETRICS  Height: 160.5 cm or 63.2 in  Weight: (verbal per patient): 66-68 kg or 145-150 lbs  BMI: 25.6  IBW: 52 kg  Usual weight pre-pregnancies: 57 kg or 125 lbs     NUTRITION HISTORY  Patient follows a low protein diet of 30-45 grams/day.     Breakfast (10 gm or less):   -2 Eggo waffles with 1-2 Tbsp peanut butter  -1-2 eggs scrambled + tea or juice     Lunch (15 gm):   -previously often skipped lunch but taking more lunch breaks at work. Has been utilizing the \"salad kits\" from [employer]    Dinner: (<20 gm)  -has been eating out frequently (~50% of time); last night was [employer] (white rice, 1/2 portion sesame chicken and stir medley veggies   -if eating at home will do pasta/bread with 4-5 pieces meat    Snacks:  -chips, rice krispies, Gwendolyn's kisses    Drinks:  -water, tea, soda 1-2 times/week     -Patient is interested in losing weight.  She feels she gained it after her pregnancies and has yet to lose it.  Looking for suggestions.       PKU FORMULA  Phenylade Essentials (strawberry) - 1-2 packets/day     This would supply 314 kcals/day (5 kcal/kg), 20 grams protein (0.3 g/kg), 480 international units Vitamin D, 658 mg calcium, and 8.8 mg iron.      Total protein + PE (foods + formula) = 50-65 gm (0.75-1 g/kg).  Total PHE 1113-2416 mg/day.     LABS  Labs reviewed;         PHE     TYR  4/17/21     7.9 1.2  7/27/20 2.8 0.9  8/1/19  2.2 0.7  Avg  4.3 0.9            MEDICATIONS  Medications reviewed;   -Kuvan daily  -5000 international units Vitamin D daily  -Fe+ daily (1/2 65 mg tablet)                ASSESSED NUTRITION NEEDS:  Estimated Energy Needs: 25-30 kcal/kg (1205-7804 kcals/day)  Estimated Protein Needs: RDA for age = 0.8 g/kg, optimal range for age/PKU: 0.8-1.2 g/kg  Micronutrient Needs: DRI/age: 600 " international units Vitamin D, 1000 mg calcium, 18 mg iron daily                 NUTRITION DIAGNOSIS:  Impaired nutrient utilization related to diagnosis of mild PKU as evidenced by elevated PHE levels.     INTERVENTIONS  Nutrition Prescription  Meet 100% estimated kcal, protein needs through moderate-protein restricted diet    Nutrition Education:   Provided education on goals for nutrition for PKU.     Reviewed weight/changes, intakes, and most recent levels.  -Reviewed role of formula as source of non-phe containing protein that can help with satiety and weight loss.  Discussed again the possible benefits of GMP based formula over amino acid based; will retry options PKU Sphere liquid and Glytactin Lite 15 (patient prefers liquid for transporting).    -Patient would like ideas for meals in appropriate calorie and target range; RD will put together 2-3 options for each meal as examples for patient to build off of.  -Reviewed benefit of tracking intake in some way (paper, nutrition apps) as this helps be more aware of choices throughout each day  -Recommend more frequent blood levels for adequate monitoring (current average 1 per year over past 3 years)     Goals  1. Weight loss of 1-2 lbs/week, decrease in BMI (<24)  -goal not met  2. Meet >85% estimated nutrition needs through low/moderate protein diet  -goal met  3. PHE levels 2-6 mg/dL  -unable to fully assess, average of 1 blood level per year    FOLLOW UP/MONITORING  Energy Intake  Macronutrient intake  Anthropometric measurements     Gay Lopez, RD, LD    Time spent with patient: 15 minutes

## 2021-09-12 ENCOUNTER — HEALTH MAINTENANCE LETTER (OUTPATIENT)
Age: 36
End: 2021-09-12

## 2021-10-01 NOTE — TELEPHONE ENCOUNTER
PA Initiation    Medication: KUVAN PA initiated - insurance change  Insurance Company: [employer] - Phone [phone] Fax [phone]  [facility] - [place], [place] - [facility] [address]  Filling Pharmacy Phone:    Filling Pharmacy Fax:    Start Date: 9/9/2020       normal... Well appearing, awake, alert, oriented to person, place, time/situation and in no apparent distress.

## 2022-01-24 ENCOUNTER — TELEPHONE (OUTPATIENT)
Dept: CONSULT | Facility: CLINIC | Age: 37
End: 2022-01-24
Payer: COMMERCIAL

## 2022-01-24 NOTE — TELEPHONE ENCOUNTER
I spoke with Laila who stated that she would like to be contacted 3 months before her annual due date and will schedule an appointment then.          Ana Maria Chau

## 2022-02-27 ENCOUNTER — HEALTH MAINTENANCE LETTER (OUTPATIENT)
Age: 37
End: 2022-02-27

## 2022-02-28 DIAGNOSIS — E70.1 PHENYLKETONURIA (PKU) (H): ICD-10-CM

## 2022-02-28 RX ORDER — SAPROPTERIN DIHYDROCHLORIDE 100 MG/1
1300 TABLET ORAL
Qty: 390 TABLET | Refills: 12 | Status: SHIPPED | OUTPATIENT
Start: 2022-02-28 | End: 2023-03-13

## 2022-03-08 ENCOUNTER — OFFICE VISIT (OUTPATIENT)
Dept: OBGYN | Facility: CLINIC | Age: 37
End: 2022-03-08
Payer: COMMERCIAL

## 2022-03-08 VITALS
HEART RATE: 79 BPM | BODY MASS INDEX: 27.99 KG/M2 | SYSTOLIC BLOOD PRESSURE: 108 MMHG | WEIGHT: 152.1 LBS | DIASTOLIC BLOOD PRESSURE: 72 MMHG | HEIGHT: 62 IN

## 2022-03-08 DIAGNOSIS — M62.08 DIASTASIS RECTI: ICD-10-CM

## 2022-03-08 DIAGNOSIS — Z01.419 ENCOUNTER FOR GYNECOLOGICAL EXAMINATION WITHOUT ABNORMAL FINDING: Primary | ICD-10-CM

## 2022-03-08 DIAGNOSIS — E66.3 OVERWEIGHT (BMI 25.0-29.9): ICD-10-CM

## 2022-03-08 DIAGNOSIS — E70.1 PHENYLKETONURIA (PKU) (H): ICD-10-CM

## 2022-03-08 DIAGNOSIS — Z00.00 PREVENTATIVE HEALTH CARE: ICD-10-CM

## 2022-03-08 PROCEDURE — 99395 PREV VISIT EST AGE 18-39: CPT | Performed by: OBSTETRICS & GYNECOLOGY

## 2022-03-08 RX ORDER — LORATADINE 10 MG/1
10 TABLET ORAL DAILY
COMMUNITY

## 2022-03-08 ASSESSMENT — PATIENT HEALTH QUESTIONNAIRE - PHQ9: SUM OF ALL RESPONSES TO PHQ QUESTIONS 1-9: 4

## 2022-03-08 NOTE — PROGRESS NOTES
Laila is a 36 year old  female who presents for annual exam.     Menses are essentially absent since mirena IUD was inserted.  No LMP recorded. (Menstrual status: IUD).. Using IUD for contraception.  She is not currently considering pregnancy.  Besides routine health maintenance,  she would like to discuss no period with iud.  She has noticed a diastasis since she second pregnancy, wondering about PT referral.  Also, she has gained some weight and with her PKU, it is hard to know best diet changes to make for weight loss.  Would like to see the nutritionist she has previously seen.  GYNECOLOGIC HISTORY:  Menarche: 15  Age at first intercourse: 31 Number of lifetime partners: <5  Lalia is sexually active with male partner(s) and is currently in monogamous relationship.    History sexually transmitted infections:No STD history  STI testing offered?  Declined  MICHAEL exposure: Unknown  History of abnormal Pap smear: NO - age 30-65 PAP every 5 years with negative HPV co-testing recommended  Family history of breast CA: No  Family history of uterine/ovarian CA: No    Family history of colon CA: No    HEALTH MAINTENANCE:  Cholesterol: (  Cholesterol   Date Value Ref Range Status   05/15/2015 210 (H) <200 mg/dL Final     Comment:     LDL Cholesterol is the primary guide to therapy.   The NCEP recommends further evaluation of: patients with cholesterol greater   than 200 mg/dL if additional risk factors are present, cholesterol greater   than   240 mg/dL, triglycerides greater than 150 mg/dL, or HDL less than 40 mg/dL.     2014 165 <200 mg/dL Final     Comment:     LDL Cholesterol is the primary guide to therapy.   The NCEP recommends further evaluation of: patients with cholesterol greater   than 200 mg/dL if additional risk factors are present, cholesterol greater   than   240 mg/dL, triglycerides greater than 150 mg/dL, or HDL less than 40 mg/dL.      History of abnormal lipids: Yes in   Mammo: NA .  History of abnormal Mammo: Not applicable.  Regular Self Breast Exams: rarely  Calcium/Vitamin D intake: source:  dietary supplement(s) Adequate? Yes  TSH: (  TSH   Date Value Ref Range Status   2020 1.48 0.40 - 4.00 mU/L Final    )  Pap; (  Lab Results   Component Value Date    PAP NIL 2020    PAP NIL 2017    PAP NIL 2014    )    HISTORY:  OB History    Para Term  AB Living   2 2 2 0 0 2   SAB IAB Ectopic Multiple Live Births   0 0 0 0 2      # Outcome Date GA Lbr Tarik/2nd Weight Sex Delivery Anes PTL Lv   2 Term 19 39w5d  3.345 kg (7 lb 6 oz) F CS-LTranv Spinal N JUMA      Name: NATHANIEL,FEMALE-ADELIA      Apgar1: 8  Apgar5: 9   1 Term 17 39w5d  3.969 kg (8 lb 12 oz) M CS-LTranv EPI  JUMA      Name: NATHANIELBABY1 ADELIA      Apgar1: 8  Apgar5: 9     Past Medical History:   Diagnosis Date     Atypical PKU (H)     since birth, manage with diet      Raynaud phenomenon     affects fingers and toes     Von Willebrand disease (H)     affects mainly menses     Von Willebrand disease (H)      Past Surgical History:   Procedure Laterality Date     BIOPSY OF SKIN LESION      2 benign nevus removed      SECTION N/A 2017    Procedure:  SECTION;  Surgeon: Ana Maria Israel MD;  Location: [facility]      SECTION N/A 2019    Procedure: Repeat  Section;  Surgeon: Erika De La Paz MD;  Location: [facility]     HC TOOTH EXTRACTION W/FORCEP       Family History   Problem Relation Age of Onset     Anemia Mother      Asthma Mother      Myocardial Infarction Father 64        alive     Hypertension Father      Parkinsonism Father      Melanoma Maternal Grandmother          from another cancer type     Anemia Maternal Grandmother      Social History     Socioeconomic History     Marital status:      Spouse name: Not on file     Number of children: Not on file     Years of education: 16     Highest education level: Not on file    Occupational History     Occupation:      Employer: [employer]   Tobacco Use     Smoking status: Never Smoker     Smokeless tobacco: Never Used   Substance and Sexual Activity     Alcohol use: No     Alcohol/week: 1.0 - 3.0 standard drink     Types: 1 - 3 Standard drinks or equivalent per week     Drug use: No     Sexual activity: Yes     Partners: Male     Birth control/protection: None   Other Topics Concern      Service No     Blood Transfusions No     Caffeine Concern No     Occupational Exposure No     Hobby Hazards No     Sleep Concern No     Stress Concern No     Weight Concern No     Special Diet Yes     Comment: protein in diet for PKU     Back Care No     Exercise No     Bike Helmet Yes     Seat Belt Yes     Self-Exams No     Comment: does regular skin exams     Parent/sibling w/ CABG, MI or angioplasty before 65F 55M? Not Asked   Social History Narrative    Caffeine intake/servings daily - 0    Calcium intake/servings daily - 3    Exercise 4 times weekly - describe ; walks, kickball,runs    Sunscreen used - Yes    Seatbelts used - Yes    Guns stored in the home - No    Self Breast Exam - Yes    Pap test up to date -  Yes    Eye exam up to date -  Yes    Dental exam up to date -  Yes    DEXA scan up to date -  No    Flex Sig/Colonoscopy up to date -  No    Mammography up to date -  No    Immunizations reviewed and up to date - Yes    Abuse: Current or Past (Physical, Sexual or Emotional) - No    Do you feel safe in your environment - Yes    Do you cope well with stress - Yes    Do you suffer from insomnia - No    Last updated by: Amina Grant  6/2/2016         Social Determinants of Health     Financial Resource Strain: Not on file   Food Insecurity: Not on file   Transportation Needs: Not on file   Physical Activity: Not on file   Stress: Not on file   Social Connections: Not on file   Intimate Partner Violence: Not on file   Housing Stability: Not on file  "      Current Outpatient Medications:      levonorgestrel (MIRENA) 20 MCG/24HR IUD, 1 each by Intrauterine route once, Disp: , Rfl:      loratadine (CLARITIN) 10 MG tablet, Take 10 mg by mouth daily PRN, Disp: , Rfl:      sapropterin dihydrochloride (KUVAN) 100 MG tablet, Take 13 tablets (1,300 mg) by mouth daily with food, Disp: 390 tablet, Rfl: 12     Allergies   Allergen Reactions     Aspartame      Christiansen GI Disturbance     Nuts Other (See Comments)       Past medical, surgical, social and family history were reviewed and updated in EPIC.    ROS:   C:     NEGATIVE for fever, chills, change in weight  I:       NEGATIVE for worrisome rashes, moles or lesions  E:     NEGATIVE for vision changes or irritation  E/M: NEGATIVE for ear, mouth and throat problems  R:     NEGATIVE for significant cough or SOB  CV:   NEGATIVE for chest pain, palpitations or peripheral edema  GI:     NEGATIVE for nausea, abdominal pain, heartburn, or change in bowel habits  :   NEGATIVE for frequency, dysuria, hematuria, vaginal discharge, or irregular bleeding  M:     NEGATIVE for significant arthralgias or myalgia  N:      NEGATIVE for weakness, dizziness or paresthesias  E:      NEGATIVE for temperature intolerance, skin/hair changes  P:      NEGATIVE for changes in mood or affect.    EXAM:  /72   Pulse 79   Ht 1.581 m (5' 2.25\")   Wt 69 kg (152 lb 1.6 oz)   BMI 27.60 kg/m     BMI: Body mass index is 27.6 kg/m .  Constitutional: healthy, alert and no distress  Head: Normocephalic. No masses, lesions, tenderness or abnormalities  Neck: Neck supple. Trachea midline. No adenopathy. Thyroid symmetric, normal size.   Cardiovascular: RRR.   Respiratory: Negative.   Breast: No nodularity, asymmetry or nipple discharge bilaterally.  Gastrointestinal: Abdomen soft, non-tender, non-distended. No masses, organomegaly. Small diastasis recti  :  Vulva:  No external lesions, normal female hair distribution, no inguinal adenopathy.  "   Urethra:  Midline, non-tender, well supported, no discharge  Vagina:  Moist, pink, no abnormal discharge, no lesions  Uterus:  Normal size, non-tender, freely mobile  Ovaries:  No masses appreciated, non-tender, mobile  Rectal Exam: deferred  Musculoskeletal: extremities normal  Skin: no suspicious lesions or rashes  Psychiatric: Affect appropriate, cooperative,mentation appears normal.     COUNSELING:   Reviewed preventive health counseling, as reflected in patient instructions  Special attention given to:        Regular exercise       Healthy diet/nutrition       Contraception       Osteoporosis prevention/bone health   reports that she has never smoked. She has never used smokeless tobacco.    Body mass index is 27.6 kg/m .  Weight management plan: Discussed healthy diet and exercise guidelines  FRAX Risk Assessment    ASSESSMENT:  36 year old female with satisfactory annual exam. Overweight, desires weight loss.  Diastasis recti  Plan: pap UTD.  Non fasting labs. Nutrition referral placed.  PT referral placed.  RTC yearly or prn.    SANTOS ABBASI MD

## 2022-03-08 NOTE — NURSING NOTE
"Chief Complaint   Patient presents with     Physical     missing periods from birth control       Initial /72   Pulse 79   Ht 1.581 m (5' 2.25\")   Wt 69 kg (152 lb 1.6 oz)   BMI 27.60 kg/m   Estimated body mass index is 27.6 kg/m  as calculated from the following:    Height as of this encounter: 1.581 m (5' 2.25\").    Weight as of this encounter: 69 kg (152 lb 1.6 oz).  BP completed using cuff size: regular    Questioned patient about current smoking habits.  Pt. has never smoked.          The following HM Due: NONE      The following patient reported/Care Every where data was sent to:  P ABSTRACT QUALITY INITIATIVES [[id number]]        patient has appointment for today  Rebecca Moulton                "

## 2022-03-09 ENCOUNTER — LAB (OUTPATIENT)
Dept: LAB | Facility: CLINIC | Age: 37
End: 2022-03-09
Payer: COMMERCIAL

## 2022-03-09 DIAGNOSIS — Z00.00 PREVENTATIVE HEALTH CARE: ICD-10-CM

## 2022-03-09 LAB
ANION GAP SERPL CALCULATED.3IONS-SCNC: 8 MMOL/L (ref 3–14)
BUN SERPL-MCNC: 11 MG/DL (ref 7–30)
CALCIUM SERPL-MCNC: 9.3 MG/DL (ref 8.5–10.1)
CHLORIDE BLD-SCNC: 109 MMOL/L (ref 94–109)
CHOLEST SERPL-MCNC: 203 MG/DL
CO2 SERPL-SCNC: 22 MMOL/L (ref 20–32)
CREAT SERPL-MCNC: 1 MG/DL (ref 0.52–1.04)
ERYTHROCYTE [DISTWIDTH] IN BLOOD BY AUTOMATED COUNT: 13.3 % (ref 10–15)
FASTING STATUS PATIENT QL REPORTED: YES
GFR SERPL CREATININE-BSD FRML MDRD: 75 ML/MIN/1.73M2
GLUCOSE BLD-MCNC: 92 MG/DL (ref 70–99)
HCT VFR BLD AUTO: 43.4 % (ref 35–47)
HDLC SERPL-MCNC: 55 MG/DL
HGB BLD-MCNC: 14.6 G/DL (ref 11.7–15.7)
LDLC SERPL CALC-MCNC: 122 MG/DL
MCH RBC QN AUTO: 29.9 PG (ref 26.5–33)
MCHC RBC AUTO-ENTMCNC: 33.6 G/DL (ref 31.5–36.5)
MCV RBC AUTO: 89 FL (ref 78–100)
NONHDLC SERPL-MCNC: 148 MG/DL
PLATELET # BLD AUTO: 174 10E3/UL (ref 150–450)
POTASSIUM BLD-SCNC: 4 MMOL/L (ref 3.4–5.3)
RBC # BLD AUTO: 4.89 10E6/UL (ref 3.8–5.2)
SODIUM SERPL-SCNC: 139 MMOL/L (ref 133–144)
TRIGL SERPL-MCNC: 132 MG/DL
WBC # BLD AUTO: 7.1 10E3/UL (ref 4–11)

## 2022-03-09 PROCEDURE — 85027 COMPLETE CBC AUTOMATED: CPT

## 2022-03-09 PROCEDURE — 36415 COLL VENOUS BLD VENIPUNCTURE: CPT

## 2022-03-09 PROCEDURE — 80048 BASIC METABOLIC PNL TOTAL CA: CPT

## 2022-03-09 PROCEDURE — 80061 LIPID PANEL: CPT

## 2022-04-22 ENCOUNTER — THERAPY VISIT (OUTPATIENT)
Dept: PHYSICAL THERAPY | Facility: CLINIC | Age: 37
End: 2022-04-22
Payer: COMMERCIAL

## 2022-04-22 DIAGNOSIS — G89.29 CHRONIC BILATERAL LOW BACK PAIN WITHOUT SCIATICA: ICD-10-CM

## 2022-04-22 DIAGNOSIS — M54.50 CHRONIC BILATERAL LOW BACK PAIN WITHOUT SCIATICA: ICD-10-CM

## 2022-04-22 DIAGNOSIS — M62.08 DIASTASIS RECTI: ICD-10-CM

## 2022-04-22 PROCEDURE — 97161 PT EVAL LOW COMPLEX 20 MIN: CPT | Mod: GP | Performed by: PHYSICAL THERAPIST

## 2022-04-22 PROCEDURE — 97112 NEUROMUSCULAR REEDUCATION: CPT | Mod: GP | Performed by: PHYSICAL THERAPIST

## 2022-04-22 PROCEDURE — 97530 THERAPEUTIC ACTIVITIES: CPT | Mod: GP | Performed by: PHYSICAL THERAPIST

## 2022-04-22 NOTE — PROGRESS NOTES
Physical Therapy Initial Evaluation  Subjective:  The history is provided by the patient.   Patient Health History  Laila Cueva being seen for diastasis recti.     Date of Onset: 2019.   Problem occurred: Pregnancy   Pain is reported as 1/10 on pain scale.  General health as reported by patient is fair.  Pertinent medical history includes: depression. Other medical history details: PKU.      Other medical allergies details: aspartame.    Other surgery history details:  (2017 and Sep 2019).     Other medications details: Kuvan, multi-vitamin, vitamin D, calcium .    Current occupation is .   Primary job tasks include:  Computer work, lifting/carrying and prolonged sitting.                  History of current episode:    Onset date/MD order: Diastasis Recti 3/8/2022.    CC/Present symptoms: Patient reports that she had diastasis recti following childbirth in 2019.  She did not notice it until about a few months ago,  She does not feel pain or pressure in that area.  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  She does get some lower back pain if she is carrying around her daughter for too long or twists wrong.  She notes one time where she went to urgent care for  Bask spasm.  Back pain is currently happening 1-2 times per month, usually goes away with rest and sleep.  Her OB did do some MILAGRO testing, but not sure of how much separation there is.  Feels her core and abdominals are not great.   Does maybe get doming with sitting up, does not pay attention to it as much as she should. MILAGRO feels impactful when she is sitting at work or sitting on floor with children.    HPI/SMHx/Childbirth hx::.  First was emergency  and second was planned .  Did have post-partum depression.  Scar is still sensitive and numb to touch.  Has PKU, feels like she overeats to make up for protein loss.    Pain rating (0-10): 0/10  Conditioning is  improving/unchanging/worsening: unchanging    Hx of or present sexually transmitted disease:n/a  Current occupation: , working from home and has standing desk with treadmill.  Feels like she doesn't have the endurance to walk on it for more than an hour  Current activity: Rarely hits 6,000 steps per day.  Mom of 2, cleaning and laundry.    Goals: Get a stonger core, have more endurance.    Red flags:none    Urination:  Do you leak on the way to the bathroom or with a strong urge to void? yes   Do you leak with cough,sneeze, jumping, running? yes  Any other activities that cause leaking?  n/a  Do you have triggers that make you feel you can't wait to go to the bathroom?  What are they? no.  Type of pad and number used per day? n/a  When you leak what is the amount? small  How long can you delay the need to urinate? n/a.   Do you feel excessive pressure in pelvic floor:no.  When?   Frequency of daytime urination:n/a  Frequency of nighttime urination:0  Can you stop the flow of urine when on the toilet? yes  Is the volume of urine passed usually:  (8sec rule= 250ml with average bladder storing 400-600ml) medium  Do you strain to pass urine? no  Do you have a slow or hesitant urinary stream? no  Do you have difficulty initiating the urine stream? no  Is urination painful? no  How many bladder infections have you had in last 12 months?0  Fluid intake(one glass is 8oz or one cup) 4-6 glasses/day, 0 caffinated glasses/day  0 alcohol glasses/day.  Bowel habits:  Frequency of bowel movements? 2 times a day  Consistancy of stool?  Burt Stool Scale soft  Do you ignore the urge to defecate? no  Do you strain to pass stool? no  Pelvic Pain:  Do you have any pelvic pain with intercourse, exams, use of tampons? Occasionally after sex (next day), ovulation  Is initial penetration during intercourse painful? no  Is deeper penetration painful? no  Do you use lubricant?  no  Are you sexually active? yes  Have you ever  been worried for your physical safety? no  Have you practiced the PF(kegel) exercises for 4 or more weeks?no  Marinoff Scale:Level n/a  (Level 3: Abstinence from intercourse because of severe pain. Level 2: Painful intercourse which limites frequency of activity. Level 1: Painful intercourse not severe enough to prevent activity.)    OBJECTIVE:      Treatment/Education provided this session: please see flow sheet for details                    Objective:  System         Lumbar/SI Evaluation  ROM:    AROM Lumbar:   Flexion:          Min peterson   Ext:                    Mod peterson pain   Side Bend:        Left:     Right:   Rotation:           Left:     Right:   Side Glide:        Left:  Min peterson     Right:  Min peterson           Lumbar Myotomes:    T12-L3 (Hip Flex):  Left: 5-    Right: 5-                                                  Pelvic Dysfunction Evaluation:        Flexibility:    Tightness present at:Piriformis  Tightness not present at:  Iliopsoas or Hamstrings    Abdominal Wall:  Abdominal wall pelvic: Below umbilicus: <1 finger width with complete closing; above umbilicus: 2 finger widths with good tension and closing, depth decreased with abdominal firing; At umbilical level: 4 finger widths with 1st DIP depth, decreased to minimal depth with <1 finger.  Diastasis Recti:  Present  Trigger Points:  NA    Pelvic Clock Exam:  NA                              Hip Evaluation  HIP AROM:  AROM:    Left Hip:     Normal    Right Hip:   Normal                                     General     ROS    ASSESSMENT/PLAN:    Patient is a 36 year old female with abdominal, pelvic floor complaints.    Patient has the following significant findings with corresponding treatment plan.                Diagnosis 1:  Low back Pain, Diastasis Recti -  manual therapy, self management, education, directional preference exercise and home program  Decreased ROM/flexibility - manual therapy and therapeutic exercise  Decreased joint mobility -  manual therapy and therapeutic exercise  Decreased strength - therapeutic exercise and therapeutic activities  Decreased proprioception - neuro re-education and therapeutic activities  Impaired gait - gait training  Impaired muscle performance - neuro re-education  Decreased function - therapeutic activities  Impaired posture - neuro re-education    Previous and current functional limitations:  (See Goal Flow Sheet for this information)    Short term and Long term goals: (See Goal Flow Sheet for this information)     Communication ability:  Patient appears to be able to clearly communicate and understand verbal and written communication and follow directions correctly.  Treatment Explanation - The following has been discussed with the patient:   RX ordered/plan of care  Anticipated outcomes  Possible risks and side effects  This patient would benefit from PT intervention to resume normal activities.   Rehab potential is good.    Frequency:  1X week, once daily  Duration:  for 6 weeks  Discharge Plan:  Achieve all LTG.  Independent in home treatment program.  Reach maximal therapeutic benefit.    Please refer to the daily flowsheet for treatment today, total treatment time and time spent performing 1:1 timed codes.

## 2022-04-24 PROBLEM — M62.08 DIASTASIS RECTI: Status: ACTIVE | Noted: 2022-04-24

## 2022-04-24 PROBLEM — M54.50 BILATERAL LOW BACK PAIN WITHOUT SCIATICA: Status: ACTIVE | Noted: 2022-04-24

## 2022-05-05 ENCOUNTER — TELEPHONE (OUTPATIENT)
Dept: CONSULT | Facility: CLINIC | Age: 37
End: 2022-05-05
Payer: COMMERCIAL

## 2022-05-05 ENCOUNTER — OFFICE VISIT (OUTPATIENT)
Dept: URGENT CARE | Facility: URGENT CARE | Age: 37
End: 2022-05-05
Payer: COMMERCIAL

## 2022-05-05 VITALS
HEART RATE: 91 BPM | RESPIRATION RATE: 16 BRPM | TEMPERATURE: 100.8 F | SYSTOLIC BLOOD PRESSURE: 112 MMHG | OXYGEN SATURATION: 100 % | DIASTOLIC BLOOD PRESSURE: 64 MMHG

## 2022-05-05 DIAGNOSIS — R05.9 COUGH: Primary | ICD-10-CM

## 2022-05-05 DIAGNOSIS — J01.90 ACUTE SINUSITIS WITH SYMPTOMS > 10 DAYS: ICD-10-CM

## 2022-05-05 PROCEDURE — 99214 OFFICE O/P EST MOD 30 MIN: CPT | Performed by: FAMILY MEDICINE

## 2022-05-05 RX ORDER — CODEINE PHOSPHATE AND GUAIFENESIN 10; 100 MG/5ML; MG/5ML
2 SOLUTION ORAL EVERY 6 HOURS PRN
Qty: 118 ML | Refills: 0 | Status: SHIPPED | OUTPATIENT
Start: 2022-05-05 | End: 2023-09-07

## 2022-05-05 RX ORDER — DOXYCYCLINE 100 MG/1
100 CAPSULE ORAL 2 TIMES DAILY
Qty: 20 CAPSULE | Refills: 0 | Status: SHIPPED | OUTPATIENT
Start: 2022-05-05 | End: 2022-05-15

## 2022-05-05 RX ORDER — BENZONATATE 200 MG/1
200 CAPSULE ORAL 3 TIMES DAILY PRN
Qty: 30 CAPSULE | Refills: 0 | Status: SHIPPED | OUTPATIENT
Start: 2022-05-05 | End: 2023-09-07

## 2022-05-05 RX ORDER — ANTIARTHRITIC COMBINATION NO.2 900 MG
TABLET ORAL
COMMUNITY
Start: 2022-02-07 | End: 2023-09-07

## 2022-05-05 NOTE — TELEPHONE ENCOUNTER
PA Initiation    Medication: SAPROPTERIN- PA Renewal initiated  Insurance Company: [employer] [place] - Phone [phone] Fax [phone]  Pharmacy Filling the Rx: [facility] - [place], [place] - [facility] [address]  Filling Pharmacy Phone: [phone]  Filling Pharmacy Fax: [phone]  Start Date: 5/5/2022

## 2022-05-05 NOTE — TELEPHONE ENCOUNTER
Prior Authorization Approval    Authorization Effective Date: 5/5/2022  Authorization Expiration Date: 5/5/2023  Medication: SAPROPTERIN- PA Renewal Approved  Approved Dose/Quantity: 13 tablets daily   Reference #: [id number]   Insurance Company: [employer] [place] - Phone [phone] Fax [phone]  Expected CoPay: $0     CoPay Card Available: Yes    Foundation Assistance Needed:    Which Pharmacy is filling the prescription (Not needed for infusion/clinic administered): [facility] - [place], [place] - [address]  Pharmacy Notified: Yes  Patient Notified: Yes

## 2022-05-06 NOTE — PROGRESS NOTES
SUBJECTIVE:   Laila Cueva is a 36 year old female presenting with a chief complaint of cough, congestion, SOB, wheezing, fever intermittently.  Onset of symptoms was 2 week(s) ago.  Course of illness is worsening.    Severity moderate  Current and Associated symptoms: cough  Treatment measures tried include zycam, albuterol, fluids.  Predisposing factors include seasonal allergies.    Had more typical cold symptoms, did seem to improve but then over the weekend got worse with cough, SOB and wheezing.  Had fever but has resolved.  Cough has worsened, feels that is due to post nasal drainage that makes her cough.  Unable to sleep due to waking up with cough    Has inhaler to use for prior bronchitis, no history of asthma    Completed Pfizer COVID vaccinations, boosted 12/10/2021    Past Medical History:   Diagnosis Date     Atypical PKU (H)     since birth, manage with diet      Raynaud phenomenon     affects fingers and toes     Von Willebrand disease (H)     affects mainly menses     Von Willebrand disease (H)      Current Outpatient Medications   Medication Sig Dispense Refill     levonorgestrel (MIRENA) 20 MCG/24HR IUD 1 each by Intrauterine route once       loratadine (CLARITIN) 10 MG tablet Take 10 mg by mouth daily PRN       Multiple Vitamins-Minerals (WOMENS MULTI) CAPS        sapropterin dihydrochloride (KUVAN) 100 MG tablet Take 13 tablets (1,300 mg) by mouth daily with food 390 tablet 12     Social History     Tobacco Use     Smoking status: Never Smoker     Smokeless tobacco: Never Used   Substance Use Topics     Alcohol use: No     Alcohol/week: 1.0 - 3.0 standard drink     Types: 1 - 3 Standard drinks or equivalent per week       ROS:  Review of systems negative except as stated above.    OBJECTIVE:  /64 (BP Location: Right arm, Patient Position: Sitting, Cuff Size: Adult Regular)   Pulse 91   Temp (!) 100.8  F (38.2  C) (Tympanic)   Resp 16   SpO2 100%   GENERAL APPEARANCE: healthy, alert  and no distress  EYES: EOMI,  PERRL, conjunctiva clear  RESP: lungs clear to auscultation - no rales, rhonchi or wheezes  CV: regular rates and rhythm, normal S1 S2, no murmur noted  PSYCH: mentation appears normal and affect normal/bright    ASSESSMENT/PLAN:  (R05.9) Cough  (primary encounter diagnosis)  Plan: benzonatate (TESSALON) 200 MG capsule,         guaiFENesin-codeine (ROBITUSSIN AC) 100-10         MG/5ML solution            (J01.90) Acute sinusitis with symptoms > 10 days  Plan: doxycycline hyclate (VIBRAMYCIN) 100 MG capsule            Worsening symptoms, appears to be more sinus related with drainage causing cough symptoms.  RX Doxycycline given for treatment for sinus infection due to prolong symptoms.  RX tessalon perles and RX kai AC given to help with cough.  Encourage to continue with inhaler use.    Follow up with primary provider if no improvement of symptoms in 1 week    Raymond Anand MD  May 5, 2022 8:51 PM

## 2022-05-27 ENCOUNTER — THERAPY VISIT (OUTPATIENT)
Dept: PHYSICAL THERAPY | Facility: CLINIC | Age: 37
End: 2022-05-27
Payer: COMMERCIAL

## 2022-05-27 DIAGNOSIS — M54.50 BILATERAL LOW BACK PAIN WITHOUT SCIATICA: ICD-10-CM

## 2022-05-27 DIAGNOSIS — M62.08 DIASTASIS RECTI: Primary | ICD-10-CM

## 2022-05-27 PROCEDURE — 97530 THERAPEUTIC ACTIVITIES: CPT | Mod: GP | Performed by: PHYSICAL THERAPIST

## 2022-05-27 PROCEDURE — 97110 THERAPEUTIC EXERCISES: CPT | Mod: GP | Performed by: PHYSICAL THERAPIST

## 2022-06-07 ENCOUNTER — HOSPITAL ENCOUNTER (OUTPATIENT)
Dept: NUTRITION | Facility: CLINIC | Age: 37
Discharge: HOME | End: 2022-06-07
Attending: OBSTETRICS & GYNECOLOGY
Payer: COMMERCIAL

## 2022-06-07 DIAGNOSIS — E70.1 PHENYLKETONURIA (PKU) (H): ICD-10-CM

## 2022-06-07 DIAGNOSIS — E66.3 OVERWEIGHT (BMI 25.0-29.9): ICD-10-CM

## 2022-06-07 NOTE — PROGRESS NOTES
Nutrition Services Brief Note     Patient scheduled with this RD for weight management and PKU. Patient looking to get scheduled with Gay Lopez, whom she has seen in the past and is familiar with PKU. Patient would like to cancel visit for today.         Fidencio Stiles RDN, LD  Clinical Dietitian   Office: [phone]

## 2022-06-10 ENCOUNTER — THERAPY VISIT (OUTPATIENT)
Dept: PHYSICAL THERAPY | Facility: CLINIC | Age: 37
End: 2022-06-10
Payer: COMMERCIAL

## 2022-06-10 DIAGNOSIS — M62.08 DIASTASIS RECTI: Primary | ICD-10-CM

## 2022-06-10 DIAGNOSIS — M54.50 BILATERAL LOW BACK PAIN WITHOUT SCIATICA: ICD-10-CM

## 2022-06-10 PROCEDURE — 97530 THERAPEUTIC ACTIVITIES: CPT | Mod: GP | Performed by: PHYSICAL THERAPIST

## 2022-06-10 PROCEDURE — 97112 NEUROMUSCULAR REEDUCATION: CPT | Mod: GP | Performed by: PHYSICAL THERAPIST

## 2022-06-10 PROCEDURE — 97110 THERAPEUTIC EXERCISES: CPT | Mod: GP | Performed by: PHYSICAL THERAPIST

## 2022-08-02 ENCOUNTER — VIRTUAL VISIT (OUTPATIENT)
Dept: PEDIATRICS | Facility: CLINIC | Age: 37
End: 2022-08-02
Attending: PEDIATRICS
Payer: COMMERCIAL

## 2022-08-02 ENCOUNTER — VIRTUAL VISIT (OUTPATIENT)
Dept: NUTRITION | Facility: CLINIC | Age: 37
End: 2022-08-02
Attending: PEDIATRICS
Payer: COMMERCIAL

## 2022-08-02 VITALS — WEIGHT: 160 LBS | BODY MASS INDEX: 29.03 KG/M2

## 2022-08-02 DIAGNOSIS — D68.00 VON WILLEBRAND DISEASE (H): ICD-10-CM

## 2022-08-02 DIAGNOSIS — E70.1 PHENYLKETONURIA (PKU) (H): ICD-10-CM

## 2022-08-02 PROCEDURE — G0463 HOSPITAL OUTPT CLINIC VISIT: HCPCS | Mod: PN,RTG | Performed by: PEDIATRICS

## 2022-08-02 PROCEDURE — 97803 MED NUTRITION INDIV SUBSEQ: CPT | Mod: TEL,95 | Performed by: DIETITIAN, REGISTERED

## 2022-08-02 PROCEDURE — 99214 OFFICE O/P EST MOD 30 MIN: CPT | Mod: 95 | Performed by: PEDIATRICS

## 2022-08-02 ASSESSMENT — PAIN SCALES - GENERAL: PAINLEVEL: NO PAIN (0)

## 2022-08-02 NOTE — LETTER
"8/2/2022      RE: Laila Cueva  [address] [place] [address]     Dear Colleague,    Thank you for the opportunity to participate in the care of your patient, Laila Cueva, at the [facility] P[facility]IN[facility] at [facility]. Please see a copy of my visit note below.    Laila is a 37 year old who is being evaluated via a billable telephone visit.      Phone call duration: 30 minutes    CLINICAL NUTRITION SERVICES - PEDIATRIC ASSESSMENT NOTE     REASON FOR ASSESSMENT  Laila Cueva is a 37 year old female seen by the dietitian for consult regarding mild PKU.     ANTHROPOMETRICS  Per patient report:   Height: 5\" 3\" (160 cm)  Weight: ~160 lbs (72.6 kg)  BMI: 28.3 kg/m^2  IBW: 52 kg +/- 10%   ABW: 57 kg   Comments: Reports on recent check was ~160 lbs. Notes weight fluctuates between ~155-165 lbs over the past 1-2 years, averaging ~160 lbs. Is interested in losing weight.      NUTRITION HISTORY  Patient follows a low protein diet. Goal per past RD note = 30-45 grams/day.    Laila estimates her current intake as high as 70 gm pro/day (aims for ~20 gm pro/meal and ~5 gm pro/snack). Reports her goals were lower when off Kuvan and notes tolerance increased when began taking more regularly.      Breakfast:  1 egg (3x/week) or chocolate crossiant, mini muffines, poptarts, occasionally cereal, peanut butter toast. Coffee/tea.     AM snack: chips, wasabi peas, vanilla wafer cookies with coffee, cheese stick or small beef stick    Lunch: Lao cashew salad (pre-made mix), rice with peas and tuna in casserole, sandwich (white bread, 1-2 slices cold cut meat such as ham, cheddar cheese, downs) with chips/fruit (applesauce, grapes, seasonal fruit)    Snack: similar items to AM snack, fruit snack     Dinner: May not always eat dinner or will be smaller such as a sandwich or pasta or chicken nuggets or pretzel with deli meat/cheese.   -If " getting fast food: [employer] will get bun, jalepeno poppers, fries with Sprite or small mocha shake; [employer] will eat 1 burger (no cheese) with fries; Anamaria-Hany-A will get deluxe chicken sandwich (eats 1/2 chicken) no cheese with fruits     Beverages: coffee/tea, soda if out to eat, water, beer/wine on occasion    Laila reports she is interested in losing weight and would like to discuss getting started on a PKU formula. Has discussed this with primary RD in the past, but looking to explore formula options. Has not taken formula in a few years. Also notes having high cholesterol at last lab check.     Reports overall feeling well. If missing Kuvan dose, she will notice she is more slow/experiences brain fog.      PKU FORMULA  None currently.      LABS  Labs reviewed; No recent PHE/TYR levels to assess. Last noted from April 2021 (PHE 7.9, TYR 1.2)             MEDICATIONS  Medications reviewed;   -Kuvan daily                ASSESSED NUTRITION NEEDS:  Estimated Energy Needs: 25-30 kcal/kg  Estimated Protein Needs: RDA for age = 0.8 g/kg, optimal range for age/PKU: 0.8-1.2 g/kg  Micronutrient Needs: DRI/age: 15 mcg Vitamin D, 1000 mg calcium, 18 mg iron daily                 NUTRITION DIAGNOSIS:  Impaired nutrient utilization related to diagnosis of mild PKU as evidenced by elevated PHE levels.     INTERVENTIONS  Nutrition Prescription  Meet 100% estimated kcal, protein needs through moderate-protein restricted diet    Nutrition Education:   Provided education on goals for nutrition for PKU.     Reviewed weight/changes, intakes, and last levels.     Laila interested in trying to get started on PKU formula as discussed at last visit - Reviewed role of formula as source of non-phe containing protein that can help with satiety and weight loss.  Discussed again the possible benefits of GMP based formula over amino acid based; will retry options PKU Sphere 20 (pakcets), Phenylade GMP Ultra, Glytactin RTD and Lite options,  Build 20/20. Will check insurance coverage if able to find option Laila likes. Samples requested.     Reviewed diet recommendations for elevated cholesterol. Discussed incorporating more fruits and veggies into diet, differences in fats and cooking methods, etc.     Discussed benefit of tracking intake in some way (paper, nutrition apps) as this helps be more aware of choices throughout each day.     Discussed checking PHE levels monthly with RD check in at this time to see how diet/formula is going. Encouraged submitting a food log prior to PHE level for RD to review and assess intakes and provide additional recommendations.      Goals  1. Decrease in BMI  2. Meet >85% estimated nutrition needs through low/moderate protein diet  3. PHE levels 2-6 mg/dL    FOLLOW UP/MONITORING  Energy Intake  Macronutrient intake  Anthropometric measurements     Olamide Stark RD, LD     Time spent with patient: 15 minutes      Please do not hesitate to contact me if you have any questions/concerns.     Sincerely,       Olamide Stark RD

## 2022-08-02 NOTE — PATIENT INSTRUCTIONS
PKU and Maternal PKU Clinic  [facility]  Pediatric Specialty Clinic ([facility])      Formula/ Diet   We did not make any changes to your diet/ medical formula today.          Medications   We did not make any changes to your medications today.         Follow up visit    1 year       Helpful Numbers   To schedule appointments:              Ana Maria [phone]  Pediatric Call Center for [facility]: [phone]  Radiology/ Imaging/ Echocardiogram: [phone]   Services:   [phone]     For questions about your/ your child's medical condition:          Dr. Bishop Monroy M.D, Ph.D               Ph: [phone]          Dr. Amanda Gillette M.D.               Ph: [phone]    For questions about medications/ supplies:          Dr. Eduard Pacheco Pharm D               Ph: [phone]    For questions about the research program you have enrolled in:           Dr. Toña GABRIELBS, CCRP               Ph: [phone]    For questions about your child's nutrition:   Gay Lopez RD  Ph: [phone]    For questions about genetic counseling or genetic testing:          Julianna Arroyo M.S [facility]             Ph: [phone]              If you have not already done so consider signing up for [employer] by speaking with the person at the  on your way out or go to [email or web address] to sign up online.   [employer] enables easy and confidential communication with your care team.

## 2022-08-02 NOTE — LETTER
"2022      RE: Laila Cueva  [address] [place] [address]     Dear Colleague,    Thank you for the opportunity to participate in the care of your patient, Laila Cueva, at the [facility] at [facility]. Please see a copy of my visit note below.                                                        A partnership of [place] and [facility] Physicians   Advanced Therapies  [facility] [address] [place] [address]  Phone: [phone]  Fax: [phone]  Date: 2022      Patient:  Laila Cueva   :   1985   MRN:     [id number]      Laila Cueva  [address] [place] [address]    Dear Laila BRIAN Hammad,    CHIEF COMPLAINT:     Thank you for sending Laila Cueva, a 37 year old female, to the [facility] \"PKU and Maternal PKU Clinic\" for consultation and treatment of phenylketonuria (PKU).    PAST MEDICAL HISTORY:    From the oral history, and medical records that are available, these items are noted:    Patient Active Problem List    Diagnosis Date Noted     Diastasis recti 2022     Priority: Medium     Bilateral low back pain without sciatica 2022     Priority: Medium     Intramural leiomyoma of uterus 2018     Priority: Medium     Maternal phenylketonuria in third trimester (H) 2016     Priority: Medium     MFM Recommendations:  1) Maternal phenylalanine levels to be sent to [facility] PKU lab every 1-2 weeks through the pregnancy (from preconception if possible, through delivery).  Given this patient's mild phenotype and good control, less frequent testing is probably fine, i.e. every 2-4 weeks.      2) Nutrition involvement.  3) Continue formula  4) Consider nuchal translucency assessment at 11-14 weeks' gestation as there is a correlation between fetuses with thick nuchal translucencies and congenital cardiac " malformations.    5) Level 2 ultrasound for fetal anatomy assessment at 18-20 weeks  6) Fetal echocardiogram at 20-22 weeks.   7) Serial ultrasounds for fetal growth every 3 weeks with close attention to the fetal head circumference.    8) Delivery at 39 weeks if the pregnancy progresses normally to that point  9) Consider earlier delivery (around 37 weeks) if fetal size or head circumference is abnormal or falling off the curve  10)  screen for PKU on baby after birth as per routine (collect at 24-48 hours after birth)  11) Baby to follow in PKU clinic at [facility] even if unaffected due to risk of neurodevelopmental delays related to in-utero exposure to high phenylalanine levels           Von Willebrand disease (H)      Priority: Medium     No laboratory confirmation of this diagnosis - labs 16 normal for pregnancy  Recommend repeat labs at least 6-8 weeks post partum for definitive exclusion of the condition       Phenylketonuria (PKU) (H) 2015     Priority: Medium     Acne 2014     Priority: Medium     CARDIOVASCULAR SCREENING; LDL GOAL LESS THAN 160 2014     Priority: Medium     Dermatofibroma of left lower leg 2014     Priority: Uvaldo Ulloa was seen previously in 2021. No major hospitalizations or ER visits since then. She is on generic Kuvan and does not report any concerns in obtaining the medication. She is not on any PKU formula and is interested in being back on the PKU formulas.    FAMILY HISTORY: A brief family medical history was reviewed.  REVIEW OF SYSTEMS: The review of systems negative for new eye, ear, heart, lung, liver, spleen, gastrointestinal, bone, muscle, integumentary, endocrinologic, brain or psychiatric issues except as noted above.  PHYSICAL EXAMINATION:   General: The patient is oriented to person, place and time at an age-appropriate manner.   HEENT: The facial features are normal and symmetric. The ears are of normal position and  "configuration and hearing is grossly normal.  Neck: The neck appears to be supple with full range of motion  Chest: Does not appear to be tachypneic or in any respiratory distress.  Heart:  Liala Cueva  appears well perfused.  Abdomen: Not examined.  Extremities: The extremities are of normal configuration without contractures nor hyperlaxities.  Back: Not examined.   Integument: The visible part of the integument is of normal appearance without significant changes in pigmentation, birthmarks, or lesions.  Neuromuscular:  Mental Status Exam: Alert, awake. Fully oriented. No dysarthria, no dysphasia. Speech of normal fluency.  Appears to have normal tone and strength.     LABORATORY RESULTS: Laboratory studies from the past year were reviewed.      ASSESSMENT:  1. PKU  2. On Kuvan  3. Stable Phe levels from before. Most recent one from April 2021  4. Not on PKU formulas  5. On Phe restricted diet    PLAN/RECOMMENDATIONS:  1. Continue low phenylalanine diet.  2. Send blood \"tyrosine and phenylalanine\" levels monthly.  3. Metabolic PKU Dietician Consult today. Olamide Brown RD to assist in getting Laila back on formulas  4. Clinical Pharmacotherapy consultation with Anita PatrickD.  5. Return to PKU Clinic in 12  months.      FOLLOW-UP INSTRUCTIONS FOR THE PATIENT:  Please schedule a return visit to clinic in 12 months.  If you are returning to clinic to review specific laboratory tests, please call the Genetic Counselor (see phone numbers below)  to confirm that we have received all of the results from reference laboratories prior to your appointment. If we have not received all of the test results, please discuss re-scheduling your appointment.      With warmest regards,     Quan Monroy PhD MD  Professor of Pediatrics  Medical Director, Advanced Therapies Program  Medical Director, PKU and Maternal PKU Clinic    Appointments: [phone]      Monday: Advanced Therapies for Lysosomal Diseases " Clinic   Tuesday: Metabolism and Genetics Clinic   Thursday: PKU Clinic    Gay Lopez, Registered Dietician: [phone]      Copy to Primary Care Practitioner:      Billie Mathews  [address] [facility] [address]    Copy  to patient:  NOEL ZIMMERMAN   [address] [place] [address]

## 2022-08-02 NOTE — PROGRESS NOTES
"Laila is a 37 year old who is being evaluated via a billable telephone visit.      Phone call duration: 30 minutes    CLINICAL NUTRITION SERVICES - PEDIATRIC ASSESSMENT NOTE     REASON FOR ASSESSMENT  Laila Cueva is a 37 year old female seen by the dietitian for consult regarding mild PKU.     ANTHROPOMETRICS  Per patient report:   Height: 5\" 3\" (160 cm)  Weight: ~160 lbs (72.6 kg)  BMI: 28.3 kg/m^2  IBW: 52 kg +/- 10%   ABW: 57 kg   Comments: Reports on recent check was ~160 lbs. Notes weight fluctuates between ~155-165 lbs over the past 1-2 years, averaging ~160 lbs. Is interested in losing weight.      NUTRITION HISTORY  Patient follows a low protein diet. Goal per past RD note = 30-45 grams/day.    Laila estimates her current intake as high as 70 gm pro/day (aims for ~20 gm pro/meal and ~5 gm pro/snack). Reports her goals were lower when off Kuvan and notes tolerance increased when began taking more regularly.      Breakfast:  1 egg (3x/week) or chocolate crossiant, mini muffines, poptarts, occasionally cereal, peanut butter toast. Coffee/tea.     AM snack: chips, wasabi peas, vanilla wafer cookies with coffee, cheese stick or small beef stick    Lunch: Burundian cashew salad (pre-made mix), rice with peas and tuna in casserole, sandwich (white bread, 1-2 slices cold cut meat such as ham, cheddar cheese, downs) with chips/fruit (applesauce, grapes, seasonal fruit)    Snack: similar items to AM snack, fruit snack     Dinner: May not always eat dinner or will be smaller such as a sandwich or pasta or chicken nuggets or pretzel with deli meat/cheese.   -If getting fast food: [employer] will get bun, jalepeno poppers, fries with Sprite or small mocha shake; [employer] will eat 1 burger (no cheese) with fries; Chik-Hany-A will get deluxe chicken sandwich (eats 1/2 chicken) no cheese with fruits     Beverages: coffee/tea, soda if out to eat, water, beer/wine on occasion    Laila reports she is interested in losing weight " and would like to discuss getting started on a PKU formula. Has discussed this with primary RD in the past, but looking to explore formula options. Has not taken formula in a few years. Also notes having high cholesterol at last lab check.     Reports overall feeling well. If missing Kuvan dose, she will notice she is more slow/experiences brain fog.      PKU FORMULA  None currently.      LABS  Labs reviewed; No recent PHE/TYR levels to assess. Last noted from April 2021 (PHE 7.9, TYR 1.2)             MEDICATIONS  Medications reviewed;   -Kuvan daily                ASSESSED NUTRITION NEEDS:  Estimated Energy Needs: 25-30 kcal/kg  Estimated Protein Needs: RDA for age = 0.8 g/kg, optimal range for age/PKU: 0.8-1.2 g/kg  Micronutrient Needs: DRI/age: 15 mcg Vitamin D, 1000 mg calcium, 18 mg iron daily                 NUTRITION DIAGNOSIS:  Impaired nutrient utilization related to diagnosis of mild PKU as evidenced by elevated PHE levels.     INTERVENTIONS  Nutrition Prescription  Meet 100% estimated kcal, protein needs through moderate-protein restricted diet    Nutrition Education:   Provided education on goals for nutrition for PKU.     Reviewed weight/changes, intakes, and last levels.     Laila interested in trying to get started on PKU formula as discussed at last visit - Reviewed role of formula as source of non-phe containing protein that can help with satiety and weight loss.  Discussed again the possible benefits of GMP based formula over amino acid based; will retry options PKU Sphere 20 (pakcets), Phenylade GMP Ultra, Glytactin RTD and Lite options, Build 20/20. Will check insurance coverage if able to find option Laila likes. Samples requested.     Reviewed diet recommendations for elevated cholesterol. Discussed incorporating more fruits and veggies into diet, differences in fats and cooking methods, etc.     Discussed benefit of tracking intake in some way (paper, nutrition apps) as this helps be more  aware of choices throughout each day.     Discussed checking PHE levels monthly with RD check in at this time to see how diet/formula is going. Encouraged submitting a food log prior to PHE level for RD to review and assess intakes and provide additional recommendations.      Goals  1. Decrease in BMI  2. Meet >85% estimated nutrition needs through low/moderate protein diet  3. PHE levels 2-6 mg/dL    FOLLOW UP/MONITORING  Energy Intake  Macronutrient intake  Anthropometric measurements     Olamide Stark RD, LD     Time spent with patient: 15 minutes

## 2022-08-02 NOTE — PROGRESS NOTES
"Laila Cueva  is being evaluated via a billable video visit.      How would you like to obtain your AVS? [employer]  For the video visit, send the invitation by: Text to cell phone: [phone]  Will anyone else be joining your video visit? No      Video-Visit Details    Type of service:  Video Visit    Video Start Time: 12:23 PM  Video End Time (time video stopped):1:00 PM    Originating Location (pt. Location): Home    Distant Location (provider location):  [facility]    Mode of Communication:  Video Conference via [employer]                                                      A partnership of [place] and [facility] Physicians   Advanced Therapies  [facility] [address]  [address] [facility] [address]  Phone: [phone]  Fax: [phone]  Date: 2022      Patient:  Laila Cueva   :   1985   MRN:     [id number]      Laila Cueva  [address] [place] [address]    Dear Laila Cueva,    CHIEF COMPLAINT:     Thank you for sending Laila Cueva, a 37 year old female, to the [facility] \"PKU and Maternal PKU Clinic\" for consultation and treatment of phenylketonuria (PKU).    PAST MEDICAL HISTORY:    From the oral history, and medical records that are available, these items are noted:    Patient Active Problem List    Diagnosis Date Noted     Diastasis recti 2022     Priority: Medium     Bilateral low back pain without sciatica 2022     Priority: Medium     Intramural leiomyoma of uterus 2018     Priority: Medium     Maternal phenylketonuria in third trimester (H) 2016     Priority: Medium     MFM Recommendations:  1) Maternal phenylalanine levels to be sent to [facility] PKU lab every 1-2 weeks through the pregnancy (from preconception if possible, through delivery).  Given this patient's mild phenotype and good control, less frequent testing is probably fine, i.e. every 2-4 weeks.      2) " Nutrition involvement.  3) Continue formula  4) Consider nuchal translucency assessment at 11-14 weeks' gestation as there is a correlation between fetuses with thick nuchal translucencies and congenital cardiac malformations.    5) Level 2 ultrasound for fetal anatomy assessment at 18-20 weeks  6) Fetal echocardiogram at 20-22 weeks.   7) Serial ultrasounds for fetal growth every 3 weeks with close attention to the fetal head circumference.    8) Delivery at 39 weeks if the pregnancy progresses normally to that point  9) Consider earlier delivery (around 37 weeks) if fetal size or head circumference is abnormal or falling off the curve  10) [place] screen for PKU on baby after birth as per routine (collect at 24-48 hours after birth)  11) Baby to follow in PKU clinic [facility] even if unaffected due to risk of neurodevelopmental delays related to in-utero exposure to high phenylalanine levels           Von Willebrand disease (H)      Priority: Medium     No laboratory confirmation of this diagnosis - labs 16 normal for pregnancy  Recommend repeat labs at least 6-8 weeks post partum for definitive exclusion of the condition       Phenylketonuria (PKU) (H) 2015     Priority: Medium     Acne 2014     Priority: Medium     CARDIOVASCULAR SCREENING; LDL GOAL LESS THAN 160 2014     Priority: Medium     Dermatofibroma of left lower leg 2014     Priority: Uvaldo Ulloa was seen previously in 2021. No major hospitalizations or ER visits since then. She is on generic Kuvan and does not report any concerns in obtaining the medication. She is not on any PKU formula and is interested in being back on the PKU formulas.    FAMILY HISTORY: A brief family medical history was reviewed.  REVIEW OF SYSTEMS: The review of systems negative for new eye, ear, heart, lung, liver, spleen, gastrointestinal, bone, muscle, integumentary, endocrinologic, brain or psychiatric issues except as noted  "above.  PHYSICAL EXAMINATION:   General: The patient is oriented to person, place and time at an age-appropriate manner.   HEENT: The facial features are normal and symmetric. The ears are of normal position and configuration and hearing is grossly normal.  Neck: The neck appears to be supple with full range of motion  Chest: Does not appear to be tachypneic or in any respiratory distress.  Heart:  Laila Cueva  appears well perfused.  Abdomen: Not examined.  Extremities: The extremities are of normal configuration without contractures nor hyperlaxities.  Back: Not examined.   Integument: The visible part of the integument is of normal appearance without significant changes in pigmentation, birthmarks, or lesions.  Neuromuscular:  Mental Status Exam: Alert, awake. Fully oriented. No dysarthria, no dysphasia. Speech of normal fluency.  Appears to have normal tone and strength.     LABORATORY RESULTS: Laboratory studies from the past year were reviewed.      ASSESSMENT:  1. PKU  2. On Kuvan  3. Stable Phe levels from before. Most recent one from April 2021  4. Not on PKU formulas  5. On Phe restricted diet    PLAN/RECOMMENDATIONS:  1. Continue low phenylalanine diet.  2. Send blood \"tyrosine and phenylalanine\" levels monthly.  3. Metabolic PKU Dietician Consult today. Olamide Brown RD to assist in getting Laila back on formulas  4. Clinical Pharmacotherapy consultation with Anita PatrickD.  5. Return to PKU Clinic in 12  months.      FOLLOW-UP INSTRUCTIONS FOR THE PATIENT:  Please schedule a return visit to clinic in 12 months.  If you are returning to clinic to review specific laboratory tests, please call the Genetic Counselor (see phone numbers below)  to confirm that we have received all of the results from reference laboratories prior to your appointment. If we have not received all of the test results, please discuss re-scheduling your appointment.      With warmest regards,     Quan Monroy " PhD MD  Professor of Pediatrics  Medical Director, Advanced Therapies Program  Medical Director, PKU and Maternal PKU Clinic    Appointments: [phone]      Monday: Advanced Therapies for Lysosomal Diseases Clinic   Tuesday: Metabolism and Genetics Clinic   Thursday: PKU Clinic    Gay Lopez, Registered Dietician: [phone]      Copy to Primary Care Practitioner:      Billie Mathews  [address] [place] [facility] [address]    Copy  to patient:  NOEL ZIMMERMAN   [address] [place] [address]

## 2022-09-17 ENCOUNTER — E-VISIT (OUTPATIENT)
Dept: URGENT CARE | Facility: CLINIC | Age: 37
End: 2022-09-17
Payer: COMMERCIAL

## 2022-09-17 DIAGNOSIS — H10.10 ALLERGIC CONJUNCTIVITIS, UNSPECIFIED LATERALITY: Primary | ICD-10-CM

## 2022-09-17 PROCEDURE — 99421 OL DIG E/M SVC 5-10 MIN: CPT | Performed by: PHYSICIAN ASSISTANT

## 2022-09-17 RX ORDER — OLOPATADINE HYDROCHLORIDE 2 MG/ML
1 SOLUTION/ DROPS OPHTHALMIC DAILY
Qty: 2 ML | Refills: 1 | Status: SHIPPED | OUTPATIENT
Start: 2022-09-17 | End: 2023-09-07

## 2022-09-17 NOTE — PATIENT INSTRUCTIONS
Thank you for choosing us for your care. I have placed an order for a prescription so that you can start treatment. View your full visit summary for details by clicking on the link below. Your pharmacist will able to address any questions you may have about the medication.     If you re not feeling better within 2-3 days, please schedule an appointment.  You can schedule an appointment right here in [facility], or call [phone]  If the visit is for the same symptoms as your eVisit, we ll refund the cost of your eVisit if seen within seven days.      Conjunctivitis, Allergic    Conjunctivitis is an irritation of the thin membrane covering the eye and the inside of the eyelid. This membrane is called the conjunctiva. The condition is often called pink eye or red eye because the eye looks pink or red. The eye may also be swollen, itchy, burning, or tearing. A watery or mucous discharge may occur. This type of conjunctivitis is not contagious.   Allergic conjunctivitis is caused by an allergen. Allergens are substances that cause the body to react with certain symptoms. Allergens that cause eye irritation include things such as house dust, smoke, or pollen in the air. This can occur seasonally, most often in the spring. Other possible allergens that may cause symptoms include cosmetics, perfumes, animal saliva or dander, chlorine in swimming pools, or contact lens.   Home care    Eye drops may be prescribed to reduce itching and redness. Use these as directed. Otherwise, over-the-counter lubricating eye drops, sometimes referred to as artificial tears, may be used.    Apply a cool compress (towel soaked in cool water) to the affected eye 3 to 4 times a day to reduce swelling and itching.    It's common to have mucus drainage during the night, causing the eyelids to become crusted by morning. Use a warm, wet cloth to wipe this away. You may also use saline irrigating solution or artificial tears to rinse away mucus in  the eye. Don't patch the eye.    You may use acetaminophen or ibuprofen to control pain, unless another medicine was prescribed. Talk with your healthcare provider if you have chronic liver or kidney disease before using these medicines. Also talk with your provider if you have ever had a stomach ulcer or digestive bleeding.    Don't wear contact lenses until your eyes have healed and all symptoms are gone.    Follow-up care  Follow up with your healthcare provider, or as advised.  When to seek medical advice  Call your healthcare provider or seek medical care right away if any of these occur:     Increased eyelid swelling    New or worsening drainage from the eye    Increasing redness around the eye    Facial swelling  [employer]Well last reviewed this educational content on 4/1/2020 2000-2021 [employer]. All rights reserved. This information is not intended as a substitute for professional medical care. Always follow your healthcare professional's instructions.

## 2022-11-19 ENCOUNTER — HEALTH MAINTENANCE LETTER (OUTPATIENT)
Age: 37
End: 2022-11-19

## 2023-01-17 ENCOUNTER — OFFICE VISIT (OUTPATIENT)
Dept: PODIATRY | Facility: CLINIC | Age: 38
End: 2023-01-17
Payer: COMMERCIAL

## 2023-01-17 VITALS
WEIGHT: 160 LBS | DIASTOLIC BLOOD PRESSURE: 72 MMHG | BODY MASS INDEX: 29.44 KG/M2 | HEIGHT: 62 IN | SYSTOLIC BLOOD PRESSURE: 118 MMHG

## 2023-01-17 DIAGNOSIS — M21.6X2 PES VALGUS, ACQUIRED, LEFT: ICD-10-CM

## 2023-01-17 DIAGNOSIS — M76.822 POSTERIOR TIBIAL TENDONITIS, LEFT: Primary | ICD-10-CM

## 2023-01-17 PROCEDURE — 99203 OFFICE O/P NEW LOW 30 MIN: CPT | Performed by: PODIATRIST

## 2023-01-17 NOTE — PATIENT INSTRUCTIONS
PATIENT INSTRUCTIONS - Podiatry / Foot & Ankle Surgery    SuperFeet orthotics  SuperFeet are over the counter (OTC), you do not need a prescription.  Wear Superfeet orthotics in all of your shoes.  Remove the existing liner and replace it with SuperFeet.    SuperFeet are available on [employer], at [employer] and most [employer].  Blue or Berry color    Diclofenac / Voltaren Gel- Apply to affected area only.  Apply 3-4 times daily for the first 3-4 days, then 1-2 times daily as needed.  Over the counter (OTC), a prescription is not needed.  Available at most pharmacies, [employer], [employer].      Diclofenac by mouth - if needed      Posterior tibial tendon eccentric exercises (single leg heel raises)    Can send to PT if needed            .

## 2023-01-17 NOTE — LETTER
2023         RE: Laila Cueva  [address] [address]        Dear Colleague,    Thank you for referring your patient, Laila Cueva, to the [facility]. Please see a copy of my visit note below.    Assessment:      ICD-10-CM    1. Posterior tibial tendonitis, left  M76.822       2. Pes valgus, acquired, left  M21.6X2            Plan:  No orders of the defined types were placed in this encounter.      Discussed the etiology and treatment of the condition with the patient.  Imaging studies reviewed and discussed with the patient.  Discussed surgical and conservative options.    Prior medial ankle pain - along PT tendon; none now  Clikcing / snapping L foot/ ankle - not painful    -Preventative PT strengthening  -PT tendon discussed  -NSAID if needed    No current pain  Clicking not worrisome if not painful & not involving the joint.      Return:  No follow-ups on file.    Valarie Denney DPM                Chief Complaint:     Patient presents with:  Musculoskeletal Problem: Clicking noise in left (inner ankle) pain is a 2     left foot pain    HPI:  Laila Cueva is a 37 year old year old female who presents for evaluation of foot pain.    Pain location- medial ankle, just below medial mall    Has since gone away  Mom has flat feet    Clicking in foot - possibly L plantar arch per pt but uncertain  No pain assd with clicking      Past Medical & Surgical History:  Past Medical History:   Diagnosis Date     Atypical PKU (H)     since birth, manage with diet      Raynaud phenomenon     affects fingers and toes     Von Willebrand disease     affects mainly menses     Von Willebrand disease       Past Surgical History:   Procedure Laterality Date     BIOPSY OF SKIN LESION      2 benign nevus removed      SECTION N/A 2017    Procedure:  SECTION;  Surgeon: nAa Maria Israel MD;  Location: [facility] L[facility]      SECTION N/A  "2019    Procedure: Repeat  Section;  Surgeon: Erika De La Paz MD;  Location: [facility] L+[facility]      TOOTH EXTRACTION W/FORCEP        Family History   Problem Relation Age of Onset     Anemia Mother      Asthma Mother      Myocardial Infarction Father 64        alive     Hypertension Father      Parkinsonism Father      Melanoma Maternal Grandmother          from another cancer type     Anemia Maternal Grandmother         Social History:  ?  History   Smoking Status     Never   Smokeless Tobacco     Never     History   Drug Use No     Social History    Substance and Sexual Activity      Alcohol use: No        Alcohol/week: 1.0 - 3.0 standard drink        Types: 1 - 3 Standard drinks or equivalent per week      Allergies:  ?   Allergies   Allergen Reactions     Aspartame      Christiansen GI Disturbance     Nuts Other (See Comments)        Medications:    Current Outpatient Medications   Medication     benzonatate (TESSALON) 200 MG capsule     guaiFENesin-codeine (ROBITUSSIN AC) 100-10 MG/5ML solution     levonorgestrel (MIRENA) 20 MCG/24HR IUD     loratadine (CLARITIN) 10 MG tablet     Multiple Vitamins-Minerals (WOMENS MULTI) CAPS     olopatadine (PATADAY) 0.2 % ophthalmic solution     sapropterin dihydrochloride (KUVAN) 100 MG tablet     No current facility-administered medications for this visit.       Physical Exam:  ?  Vitals:  /72   Ht 1.575 m (5' 2\")   Wt 72.6 kg (160 lb)   BMI 29.26 kg/m     General:  WD/WN, in NAD.  A&O x3.  Dermatologic:    Skin is intact, open lesions absent.   Skin texture, turgor is normal.  Vascular:  Pulses palpable bilateral.  Digital capillary refill time normal bilateral.  Skin temperature is normal bilateral.  Generalized edema- none bilateral.  Focal edema- normal ankle left.  Neurologic:    Gross sensation normal.  Gait and balance normal.  Musculoskeletal:  Mild pain to palpation of PT tendon inframalleolarly left.  no pain to palpation of sinus tarsi, " medial ankle gutter left.  Ankle, STJ, MTJ ROM full, pain free left.  Ankle circumduction does not prodcue clicking L.    Double limb heel rise-  heel  reducible bilateral.  Single limb heel rise- pain absent,  weakness absent left.     Stance:  RCSP Valgus bilateral.  Foot type:  Flexible flatfoot                    Again, thank you for allowing me to participate in the care of your patient.        Sincerely,        Valarie Denney DPM

## 2023-01-17 NOTE — PROGRESS NOTES
Assessment:      ICD-10-CM    1. Posterior tibial tendonitis, left  M76.822       2. Pes valgus, acquired, left  M21.6X2            Plan:  No orders of the defined types were placed in this encounter.      Discussed the etiology and treatment of the condition with the patient.  Imaging studies reviewed and discussed with the patient.  Discussed surgical and conservative options.    Prior medial ankle pain - along PT tendon; none now  Clikcing / snapping L foot/ ankle - not painful    -Preventative PT strengthening  -PT tendon discussed  -NSAID if needed    No current pain  Clicking not worrisome if not painful & not involving the joint.      Return:  No follow-ups on file.    Valarie Denney DPM                Chief Complaint:     Patient presents with:  Musculoskeletal Problem: Clicking noise in left (inner ankle) pain is a 2     left foot pain    HPI:  Laila Cueva is a 37 year old year old female who presents for evaluation of foot pain.    Pain location- medial ankle, just below medial mall    Has since gone away  Mom has flat feet    Clicking in foot - possibly L plantar arch per pt but uncertain  No pain assd with clicking      Past Medical & Surgical History:  Past Medical History:   Diagnosis Date     Atypical PKU (H)     since birth, manage with diet      Raynaud phenomenon     affects fingers and toes     Von Willebrand disease     affects mainly menses     Von Willebrand disease       Past Surgical History:   Procedure Laterality Date     BIOPSY OF SKIN LESION      2 benign nevus removed      SECTION N/A 2017    Procedure:  SECTION;  Surgeon: Ana Maria Israel MD;  [facility]      SECTION N/A 2019    Procedure: Repeat  Section;  Surgeon: Erika De La Paz MD;  Location: [facility]     HC TOOTH EXTRACTION W/FORCEP        Family History   Problem Relation Age of Onset     Anemia Mother      Asthma Mother      Myocardial Infarction Father 64  "       alive     Hypertension Father      Parkinsonism Father      Melanoma Maternal Grandmother          from another cancer type     Anemia Maternal Grandmother         Social History:  ?  History   Smoking Status     Never   Smokeless Tobacco     Never     History   Drug Use No     Social History    Substance and Sexual Activity      Alcohol use: No        Alcohol/week: 1.0 - 3.0 standard drink        Types: 1 - 3 Standard drinks or equivalent per week      Allergies:  ?   Allergies   Allergen Reactions     Aspartame      Christiansen GI Disturbance     Nuts Other (See Comments)        Medications:    Current Outpatient Medications   Medication     benzonatate (TESSALON) 200 MG capsule     guaiFENesin-codeine (ROBITUSSIN AC) 100-10 MG/5ML solution     levonorgestrel (MIRENA) 20 MCG/24HR IUD     loratadine (CLARITIN) 10 MG tablet     Multiple Vitamins-Minerals (WOMENS MULTI) CAPS     olopatadine (PATADAY) 0.2 % ophthalmic solution     sapropterin dihydrochloride (KUVAN) 100 MG tablet     No current facility-administered medications for this visit.       Physical Exam:  ?  Vitals:  /72   Ht 1.575 m (5' 2\")   Wt 72.6 kg (160 lb)   BMI 29.26 kg/m     General:  WD/WN, in NAD.  A&O x3.  Dermatologic:    Skin is intact, open lesions absent.   Skin texture, turgor is normal.  Vascular:  Pulses palpable bilateral.  Digital capillary refill time normal bilateral.  Skin temperature is normal bilateral.  Generalized edema- none bilateral.  Focal edema- normal ankle left.  Neurologic:    Gross sensation normal.  Gait and balance normal.  Musculoskeletal:  Mild pain to palpation of PT tendon inframalleolarly left.  no pain to palpation of sinus tarsi, medial ankle gutter left.  Ankle, STJ, MTJ ROM full, pain free left.  Ankle circumduction does not prodcue clicking L.    Double limb heel rise-  heel  reducible bilateral.  Single limb heel rise- pain absent,  weakness absent left.     Stance:  RCSP Valgus " bilateral.  Foot type:  Flexible flatfoot

## 2023-03-06 ENCOUNTER — ANCILLARY PROCEDURE (OUTPATIENT)
Dept: GENERAL RADIOLOGY | Facility: CLINIC | Age: 38
End: 2023-03-06
Attending: PHYSICIAN ASSISTANT
Payer: COMMERCIAL

## 2023-03-06 ENCOUNTER — OFFICE VISIT (OUTPATIENT)
Dept: URGENT CARE | Facility: URGENT CARE | Age: 38
End: 2023-03-06
Payer: COMMERCIAL

## 2023-03-06 VITALS
SYSTOLIC BLOOD PRESSURE: 108 MMHG | DIASTOLIC BLOOD PRESSURE: 75 MMHG | TEMPERATURE: 98.3 F | OXYGEN SATURATION: 98 % | HEART RATE: 70 BPM

## 2023-03-06 DIAGNOSIS — S99.921A INJURY OF TOE, RIGHT, INITIAL ENCOUNTER: ICD-10-CM

## 2023-03-06 DIAGNOSIS — S99.921A INJURY OF TOE, RIGHT, INITIAL ENCOUNTER: Primary | ICD-10-CM

## 2023-03-06 PROCEDURE — 73660 X-RAY EXAM OF TOE(S): CPT | Mod: TC | Performed by: RADIOLOGY

## 2023-03-06 PROCEDURE — 99213 OFFICE O/P EST LOW 20 MIN: CPT | Performed by: PHYSICIAN ASSISTANT

## 2023-03-06 RX ORDER — IBUPROFEN 200 MG
1 CAPSULE ORAL 2 TIMES DAILY
COMMUNITY

## 2023-03-07 NOTE — PROGRESS NOTES
"  Assessment & Plan     Injury of toe, right, initial encounter    Xray toe Negative for acute findings, read by Deshawn HERMAN at time of visit.    RICE treatment: Rest, Ice, compression, elevation   OTC motrin  Return to activity as tolerated  - XR Toe Right G/E 2 Views; Future      BMI:   Estimated body mass index is 29.26 kg/m  as calculated from the following:    Height as of 1/17/23: 1.575 m (5' 2\").    Weight as of 1/17/23: 72.6 kg (160 lb).       At today's visit with Laila Cueva , we discussed results, diagnosis, medications and formulated a plan.  We also discussed red flags for immediate return to clinic/ER, as well as indications for follow up with PCP if not improved in 3 days. Patient understood and agreed to plan. Laila Cuvea was discharged with stable vitals and has no further questions.       No follow-ups on file.    Deshawn Winters, [facility], PAEPIFANIO  [facility]RGENT [facility]    Yudi Ulloa is a 37 year old, presenting for the following health issues:  Urgent Care (Jammed pinky toe in door jam last night. Bruising, swelling)      HPI   Review of Systems   Constitutional, HEENT, cardiovascular, pulmonary, gi and gu systems are negative, except as otherwise noted.      Objective    /75 (BP Location: Left arm, Patient Position: Sitting, Cuff Size: Adult Regular)   Pulse 70   Temp 98.3  F (36.8  C) (Oral)   SpO2 98%   Breastfeeding No   There is no height or weight on file to calculate BMI.  Physical Exam   GENERAL: healthy, alert and no distress  MS: Positive for right 5th toe tenderness localized swelling and bruising  SKIN: Positive for mild swelling of right 5th toe  NEURO: Normal strength and tone, mentation intact and speech normal  PSYCH: mentation appears normal, affect normal/bright                  "

## 2023-03-13 DIAGNOSIS — E70.1 PHENYLKETONURIA (PKU) (H): ICD-10-CM

## 2023-03-13 RX ORDER — SAPROPTERIN DIHYDROCHLORIDE 100 MG/1
1300 TABLET ORAL
Qty: 390 TABLET | Refills: 11 | Status: SHIPPED | OUTPATIENT
Start: 2023-03-13 | End: 2024-03-18

## 2023-04-09 ENCOUNTER — HEALTH MAINTENANCE LETTER (OUTPATIENT)
Age: 38
End: 2023-04-09

## 2023-04-19 ENCOUNTER — TELEPHONE (OUTPATIENT)
Dept: CONSULT | Facility: CLINIC | Age: 38
End: 2023-04-19
Payer: COMMERCIAL

## 2023-04-19 NOTE — TELEPHONE ENCOUNTER
PA Initiation    Medication: Sapropterin PA Renewal  Insurance Company: [facility] - Phone [phone] Fax [phone]  Pharmacy Filling the Rx:    Filling Pharmacy Phone:    Filling Pharmacy Fax:    Start Date: 4/19/2023    Key: [id number]

## 2023-04-27 NOTE — TELEPHONE ENCOUNTER
I called again to get the denial letter and it says they are having technical difficulties and disconnects me

## 2023-05-08 NOTE — TELEPHONE ENCOUNTER
PRIOR AUTHORIZATION DENIED    Medication: Sapropterin PA Renewal    Denial Date: 4/21/2023    Denial Rational: Need updated labs      Appeal Information:   Fax [phone]

## 2023-05-10 ENCOUNTER — LAB (OUTPATIENT)
Dept: LAB | Facility: CLINIC | Age: 38
End: 2023-05-10
Payer: COMMERCIAL

## 2023-05-10 DIAGNOSIS — E70.1 PHENYLKETONURIA (PKU) (H): ICD-10-CM

## 2023-05-18 LAB
PHE SERPL-MCNC: 5 MG/DL (ref 0.5–1.6)
PHE SERPL-SCNC: 30.3 UMOL/DL (ref 3–10)
TYROSINE SERPL-MCNC: 2 MG/DL (ref 0.6–2.4)
TYROSINE SERPL-SCNC: 11 UMOL/DL (ref 4–13)

## 2023-05-18 NOTE — TELEPHONE ENCOUNTER
Trying to resubmit PA with updated labs    Medication Appeal Initiation    We have initiated an appeal for the requested medication:  Medication:    Appeal Start Date:  5/15/2023  Insurance Company:    Insurance Phone:    Insurance Fax:    Comments:           [id number]

## 2023-05-19 NOTE — TELEPHONE ENCOUNTER
MEDICATION APPEAL APPROVED    Medication:    Authorization Effective Date:  5/18/23  Authorization Expiration Date:  5/18/24  Approved Dose/Quantity: ud  Reference #: Key: [id number]   Appeal Insurance Company:    Expected CoPay:       CoPay Card Available:    Financial Assistance Needed:    Which Pharmacy is filling the prescription:    Patient Notified: yes

## 2023-08-03 ENCOUNTER — TELEPHONE (OUTPATIENT)
Dept: CONSULT | Facility: CLINIC | Age: 38
End: 2023-08-03
Payer: COMMERCIAL

## 2023-08-03 NOTE — TELEPHONE ENCOUNTER
I left Laila a voice message in regards of coordinating a follow-up virtual visit in the PKU Clinic to meet with Dr Monroy.       To schedule an appointment, please contact Dr. Monroy's coordinator, Ana Maria, at [phone]. She will be happy to help coordinate an appointment with you.       Thank you,   Ana Maria Chau  Specialty   [address] [address]  [email or web address]  [email or web address]  Office: [phone]

## 2023-08-24 ENCOUNTER — TELEPHONE (OUTPATIENT)
Dept: CONSULT | Facility: CLINIC | Age: 38
End: 2023-08-24
Payer: COMMERCIAL

## 2023-08-24 NOTE — TELEPHONE ENCOUNTER
This is my second attempt on trying to reach Laila to get her on the schedule for a follow-up virtual visit with Dr Jacobo in the PKU clinic.     To schedule an appointment, please contact the coordinator at [phone].     Thank you,   Ana Maria Chau

## 2023-08-28 ENCOUNTER — OFFICE VISIT (OUTPATIENT)
Dept: URGENT CARE | Facility: URGENT CARE | Age: 38
End: 2023-08-28
Payer: COMMERCIAL

## 2023-08-28 VITALS
HEART RATE: 72 BPM | TEMPERATURE: 97.4 F | WEIGHT: 173 LBS | SYSTOLIC BLOOD PRESSURE: 102 MMHG | OXYGEN SATURATION: 97 % | BODY MASS INDEX: 31.64 KG/M2 | DIASTOLIC BLOOD PRESSURE: 66 MMHG

## 2023-08-28 DIAGNOSIS — R50.9 FEVER, UNSPECIFIED: ICD-10-CM

## 2023-08-28 DIAGNOSIS — J20.9 ACUTE BRONCHITIS WITH COEXISTING CONDITION REQUIRING PROPHYLACTIC TREATMENT: Primary | ICD-10-CM

## 2023-08-28 LAB — DEPRECATED S PYO AG THROAT QL EIA: NEGATIVE

## 2023-08-28 PROCEDURE — 87651 STREP A DNA AMP PROBE: CPT | Performed by: FAMILY MEDICINE

## 2023-08-28 PROCEDURE — 87635 SARS-COV-2 COVID-19 AMP PRB: CPT | Performed by: FAMILY MEDICINE

## 2023-08-28 PROCEDURE — 99213 OFFICE O/P EST LOW 20 MIN: CPT | Performed by: FAMILY MEDICINE

## 2023-08-28 RX ORDER — PREDNISONE 20 MG/1
20 TABLET ORAL 2 TIMES DAILY
Qty: 10 TABLET | Refills: 0 | Status: SHIPPED | OUTPATIENT
Start: 2023-08-28 | End: 2023-09-02

## 2023-08-28 RX ORDER — AZITHROMYCIN 250 MG/1
TABLET, FILM COATED ORAL
Qty: 6 TABLET | Refills: 0 | Status: SHIPPED | OUTPATIENT
Start: 2023-08-28 | End: 2023-09-02

## 2023-08-28 RX ORDER — ALBUTEROL SULFATE 90 UG/1
2 AEROSOL, METERED RESPIRATORY (INHALATION) EVERY 6 HOURS
Qty: 18 G | Refills: 0 | Status: SHIPPED | OUTPATIENT
Start: 2023-08-28 | End: 2023-09-27

## 2023-08-29 LAB — GROUP A STREP BY PCR: NOT DETECTED

## 2023-08-30 LAB — SARS-COV-2 RNA RESP QL NAA+PROBE: NEGATIVE

## 2023-09-03 NOTE — PROGRESS NOTES
"SUBJECTIVE: Laila Cueva is a 38 year old female presenting with a chief complaint of \"cold symptoms\".  Onset of symptoms was day(s) ago.  Predisposing factors include ill contact: Family member .    Past Medical History:   Diagnosis Date    Atypical PKU (H)     since birth, manage with diet     Raynaud phenomenon     affects fingers and toes    Von Willebrand disease (H)     affects mainly menses    Von Willebrand disease (H)      Allergies   Allergen Reactions    Aspartame     Christiansen GI Disturbance    Nuts Other (See Comments)     Social History     Tobacco Use    Smoking status: Never    Smokeless tobacco: Never   Substance Use Topics    Alcohol use: No     Alcohol/week: 1.0 - 3.0 standard drink of alcohol     Types: 1 - 3 Standard drinks or equivalent per week       ROS:  SKIN: no rash  GI: no vomiting    OBJECTIVE:  /66 (BP Location: Left arm, Patient Position: Sitting, Cuff Size: Adult Regular)   Pulse 72   Temp 97.4  F (36.3  C) (Tympanic)   Wt 78.5 kg (173 lb)   SpO2 97%   Breastfeeding No   BMI 31.64 kg/m  GENERAL APPEARANCE: healthy, alert and no distress  EYES: EOMI,  PERRL, conjunctiva clear  HENT: ear canals and TM's normal.  Nose and mouth without ulcers, erythema or lesions  RESP: lungs clear to auscultation - no rales, rhonchi or wheezes  SKIN: no suspicious lesions or rashes      ICD-10-CM    1. Acute bronchitis with coexisting condition requiring prophylactic treatment  J20.9 azithromycin (ZITHROMAX) 250 MG tablet     predniSONE (DELTASONE) 20 MG tablet     albuterol (PROAIR HFA) 108 (90 Base) MCG/ACT inhaler      2. Fever, unspecified  R50.9 Streptococcus A Rapid Screen w/Reflex to PCR - Clinic Collect     Symptomatic COVID-19 Virus (Coronavirus) by PCR Nose     Group A Streptococcus PCR Throat Swab          Fluids/Rest, f/u if worse/not any better    "

## 2023-09-06 ENCOUNTER — PHARMACY VISIT (OUTPATIENT)
Dept: ADMINISTRATIVE | Facility: CLINIC | Age: 38
End: 2023-09-06
Payer: COMMERCIAL

## 2023-09-06 NOTE — PROGRESS NOTES
PKU Follow-Up Assessment   Completed on 2023/09/06 15:26:11 [facility], by Terrie Vo      Activity Date 2023/09/06     Activity Medications    KUVAN        Care Details    What are the patient's goals for this therapy?   ? 5/30/2023: being consistent in taking sapropterin. 5/6/2022: Phe level within goal range; meeting with nutritionist in June to revamp diet for PKU and recently diagnosed high cholesterol.5/18/2021: Keep phe level within goal range      Is the patient meeting treatment goals?   ? Yes      Please select the medication being used by the patient to treat their PKU disease:   ? Kuvan/sapropterin    Has the patient started Kuvan/sapropterin?   ? Yes    What is the start date for Kuvan?   ? 2015-07-11    How was the patient assessed for response to Kuvan/sapropterin?   ? START Program    What is the patient's weight (kg)?   ? 74.8    What is the patient's current Kuvan daily dose (mg)?   ? 1300    What is the patient's current daily Kuvan/sapropterin dose (mg/kg/day)?   ? 17.3    Is this dose appropriate?   ? Yes    What dosage forms of Kuvan/sapropterin does the patient use?   ? 100mg Oral Tablets         Please enter patient's most recent PDC: [QA Metric]   ? 0.96      How many doses have been missed in the past month?   ? none      Based on the patient's report or refill record over the last 6-12 months, does the patient appear to be taking less than 80% of their medication? [QA Metric]   ? No      When was the patient's most recent blood phenylalanine checked?   ? 2023-05-10      What was the patient's most recent blood [Phe] level (mg/dL)? [QA Metric]   ? 5      Is the patient's [Phe] level at goal? (Goal of 2-6mg/dL)   ? Yes          Summary Notes   I had the pleasure of speaking to pt for TM. No side effects. Doses: pt states she is doing good remembering doses. Still uses her pill container. Does not use a phone alarm. No health, allergy or medication changes. No questions or concerns.   - Will  continue quarterly TM.       Tracey CORNELIUS, PharmD, CSP  Therapy Management Pharmacist  [address] [address]   [email or web address]  [email or web address]   Specialty: [phone]  Mail Order: [phone]

## 2023-09-07 ENCOUNTER — OFFICE VISIT (OUTPATIENT)
Dept: URGENT CARE | Facility: URGENT CARE | Age: 38
End: 2023-09-07
Payer: COMMERCIAL

## 2023-09-07 VITALS
HEART RATE: 70 BPM | OXYGEN SATURATION: 100 % | SYSTOLIC BLOOD PRESSURE: 120 MMHG | BODY MASS INDEX: 31.61 KG/M2 | WEIGHT: 172.8 LBS | TEMPERATURE: 97.4 F | DIASTOLIC BLOOD PRESSURE: 78 MMHG

## 2023-09-07 DIAGNOSIS — R51.9 ACUTE INTRACTABLE HEADACHE, UNSPECIFIED HEADACHE TYPE: Primary | ICD-10-CM

## 2023-09-07 DIAGNOSIS — R11.0 NAUSEA: ICD-10-CM

## 2023-09-07 PROCEDURE — 96372 THER/PROPH/DIAG INJ SC/IM: CPT | Performed by: NURSE PRACTITIONER

## 2023-09-07 PROCEDURE — 99213 OFFICE O/P EST LOW 20 MIN: CPT | Mod: 25 | Performed by: NURSE PRACTITIONER

## 2023-09-07 RX ORDER — KETOROLAC TROMETHAMINE 30 MG/ML
30 INJECTION, SOLUTION INTRAMUSCULAR; INTRAVENOUS ONCE
Status: COMPLETED | OUTPATIENT
Start: 2023-09-07 | End: 2023-09-07

## 2023-09-07 RX ORDER — ONDANSETRON HYDROCHLORIDE 4 MG/5ML
4 SOLUTION ORAL ONCE
Status: COMPLETED | OUTPATIENT
Start: 2023-09-07 | End: 2023-09-07

## 2023-09-07 RX ADMIN — ONDANSETRON HYDROCHLORIDE 4 MG: 4 SOLUTION ORAL at 13:18

## 2023-09-07 RX ADMIN — KETOROLAC TROMETHAMINE 30 MG: 30 INJECTION, SOLUTION INTRAMUSCULAR; INTRAVENOUS at 13:23

## 2023-09-07 NOTE — PROGRESS NOTES
Clinic Administered Medication Documentation        Patient was given Ketorolac 30mg. Prior to medication administration, verified patient's identity using patient s name and date of birth. Please see MAR and medication order for additional information. Patient instructed to remain in clinic for 15 minutes and report any adverse reaction to staff immediately.    Vial/Syringe: Single dose vial. Was entire vial of medication used? Yes  Clinic Administered Medication Documentation    Patient was given Zofran 4 mg solution. Prior to medication administration, verified patient's identity using patient's name and date of birth.    Eleanor Watson, CMA

## 2023-09-07 NOTE — PATIENT INSTRUCTIONS
Do not take any nonsteroidal anti-inflammatory medications such as naproxen, ibuprofen, Advil, Aleve or aspirin until at least 8 PM this evening.    May take acetaminophen/Tylenol 1000 mg every 6 hours as needed for pain.

## 2023-09-07 NOTE — PROGRESS NOTES
Chief Complaint   Patient presents with    Headache     Pt reports waking up at 230 this morning with a migraine. Also states pulsating 'whoosing in my bilateral ears'         ICD-10-CM    1. Acute intractable headache, unspecified headache type  R51.9 ketorolac (TORADOL) injection 30 mg      2. Nausea  R11.0 ondansetron (ZOFRAN) solution 4 mg      Toradol injection given with significant reduction in patient's pain.  After 25 minutes she had no allergic reaction.  Ondansetron liquid given and this ondansetron tablets have aspartame in them which she is allergic to.  If she develops other concerning symptoms or these headaches continue to occur she is advised to follow-up with her primary care provider.      Subjective     Laila Cueva is an 38 year old female who presents to clinic today for awoke with severe headache around 3am, took Tylenol with some relief. Headache is focused right between her eyes, photosensitivity, made worse with talking or eye movement. Slight nausea.  She has had similar headaches before although thinks this 1 might be a little worse than usual.  She took Tylenol at home without relief.      ROS: 10 point ROS neg other than the symptoms noted above in the HPI.       Objective    /78   Pulse 70   Temp 97.4  F (36.3  C) (Tympanic)   Wt 78.4 kg (172 lb 12.8 oz)   SpO2 100%   BMI 31.61 kg/m    Nurses notes and VS have been reviewed.    Physical Exam       GENERAL APPEARANCE: alert and moderate distress     EYES: PERRL, EOMI, sclera non-icteric, positive for photosensitivity     HENT: oral exam benign, mucus membranes intact, without ulcers or lesions     NECK: no adenopathy or asymmetry, thyroid normal to palpation     RESP: lungs clear to auscultation - no rales, rhonchi or wheezes     CV: regular rates and rhythm, no murmurs, rubs, or gallop     ABDOMEN:  soft, nontender, no HSM or masses and bowel sounds normal     MS: extremities normal- no gross deformities noted; normal  muscle tone.     SKIN: no suspicious lesions or rashes     NEURO: Normal strength and tone, mentation intact and speech normal, cranial nerves II through XII are intact     PSYCH: normal thought process; no significant mood disturbance      JEREMY Miller, CNP  [facility] Provider    The use of Dragon/[employer] dictation services may have been used to construct the content in this note; any grammatical or spelling errors are non-intentional. Please contact the author of this note directly if you are in need of any clarification.

## 2023-12-21 ENCOUNTER — PHARMACY VISIT (OUTPATIENT)
Dept: ADMINISTRATIVE | Facility: CLINIC | Age: 38
End: 2023-12-21
Payer: COMMERCIAL

## 2023-12-21 NOTE — PROGRESS NOTES
PKU Follow-Up Assessment   Completed on 2023/12/21 18:12:44 [facility], by Terrie Vo      Activity Date 2023/12/21     Activity Medications    KUVAN        Care Details    What are the patient's goals for this therapy?   ? 5/30/2023: being consistent in taking sapropterin. 5/6/2022: Phe level within goal range; meeting with nutritionist in June to revamp diet for PKU and recently diagnosed high cholesterol.5/18/2021: Keep phe level within goal range      Is the patient meeting treatment goals?   ? Progressing towards goals      Please select the medication being used by the patient to treat their PKU disease:   ? Kuvan/sapropterin    Has the patient started Kuvan/sapropterin?   ? Yes    What is the start date for Kuvan?   ? 2015-07-11    How was the patient assessed for response to Kuvan/sapropterin?   ? START Program    What is the patient's weight (kg)?   ? 74.8    What is the patient's current Kuvan daily dose (mg)?   ? 1300    What is the patient's current daily Kuvan/sapropterin dose (mg/kg/day)?   ? 17.3    Is this dose appropriate?   ? Yes    What dosage forms of Kuvan/sapropterin does the patient use?   ? 100mg Oral Tablets         Please enter patient's most recent PDC: [QA Metric]   ? 0.9      How many doses have been missed in the past month?   ? 3-4      Based on the patient's report or refill record over the last 6-12 months, does the patient appear to be taking less than 80% of their medication? [QA Metric]   ? No      When was the patient's most recent blood phenylalanine checked?   ? 2023-05-10      What was the patient's most recent blood [Phe] level (mg/dL)? [QA Metric]   ? 5      Is the patient's [Phe] level at goal? (Goal of 2-6mg/dL)   ? Yes          Summary Notes  I had the pleasure of speaking to pt for TM.   No side effects. Doses: this past month may have missed 3 or 4 doses. Due to this busy time of year, forgot to set up her pill container. Does not use a phone alarm. States pill container  still helps her.   No health, allergy or medication changes. Hk=394 lbs (states she fluctuates between 160-170). No questions or concerns.   - Will continue quarterly TM.       Tracey CORNELIUS, PharmD, CSP  Therapy Management Pharmacist  [address] [address]   [email or web address]  [email or web address]   Specialty: [phone]  Mail Order: [phone]

## 2024-03-18 DIAGNOSIS — E70.1 PHENYLKETONURIA (PKU) (H): ICD-10-CM

## 2024-03-18 RX ORDER — SAPROPTERIN DIHYDROCHLORIDE 100 MG/1
1300 TABLET ORAL
Qty: 390 TABLET | Refills: 2 | Status: SHIPPED | OUTPATIENT
Start: 2024-03-18 | End: 2024-04-16

## 2024-03-21 ENCOUNTER — TELEPHONE (OUTPATIENT)
Dept: CONSULT | Facility: CLINIC | Age: 39
End: 2024-03-21
Payer: COMMERCIAL

## 2024-04-02 DIAGNOSIS — E70.1 PHENYLKETONURIA (PKU) (H): ICD-10-CM

## 2024-04-02 RX ORDER — SAPROPTERIN DIHYDROCHLORIDE 100 MG/1
1300 TABLET ORAL
Qty: 390 TABLET | Refills: 2 | Status: CANCELLED | OUTPATIENT
Start: 2024-04-02

## 2024-04-16 ENCOUNTER — VIRTUAL VISIT (OUTPATIENT)
Dept: PEDIATRICS | Facility: CLINIC | Age: 39
End: 2024-04-16
Attending: PEDIATRICS
Payer: COMMERCIAL

## 2024-04-16 ENCOUNTER — HOME INFUSION (PRE-WILLOW HOME INFUSION) (OUTPATIENT)
Dept: PHARMACY | Facility: CLINIC | Age: 39
End: 2024-04-16
Payer: COMMERCIAL

## 2024-04-16 ENCOUNTER — VIRTUAL VISIT (OUTPATIENT)
Dept: PEDIATRICS | Facility: CLINIC | Age: 39
End: 2024-04-16
Attending: DIETITIAN, REGISTERED
Payer: COMMERCIAL

## 2024-04-16 DIAGNOSIS — E70.1 PHENYLKETONURIA (PKU) (H): Primary | ICD-10-CM

## 2024-04-16 PROCEDURE — 99214 OFFICE O/P EST MOD 30 MIN: CPT | Mod: 95 | Performed by: PEDIATRICS

## 2024-04-16 RX ORDER — SAPROPTERIN DIHYDROCHLORIDE 100 MG/1
1500 TABLET ORAL
Qty: 1350 TABLET | Refills: 3 | Status: SHIPPED | OUTPATIENT
Start: 2024-04-16

## 2024-04-16 NOTE — PROGRESS NOTES
Laila is a 38 year old who is being evaluated via a billable video visit.    How would you like to obtain your AVS? MyChart  If the video visit is dropped, the invitation should be resent by: Text to cell phone: [phone]  Will anyone else be joining your video visit? Dr. Kalpana Chopra, PharmD. (Pharmacist) and JESSY Restrepo. (Research Physician)    Video-Visit Details:    Type of service:  Video Visit    Video Start Time: 10:30 AM  Video End Time (time video stopped): 10:52 AM    Originating Location (pt. Location): Home    Distant Location (provider location): Off-site   Mode of Communication:  Video Conference via [facility]             Phenylketonuria (PKU) Clinic  [facility] [address] [place] [address]  Phone: [phone]  Fax: [phone]  Date: 2024      Patient:  Laila Cueva   :   1985   MRN:     [id number]      Laila Cueva  [address] [place] [address]    Dear Laila Cueva,    CHIEF COMPLAINT:     I had the pleasure of seeing Laila Cueva in the PKU and Maternal PKU Clinic at the [facility] regarding phenylketonuria (PKU). Patient is here for evaluation and treatment.      PAST MEDICAL HISTORY:    These list of past medical problems includes:    Patient Active Problem List   Diagnosis    Acne    CARDIOVASCULAR SCREENING; LDL GOAL LESS THAN 160    Dermatofibroma of left lower leg    Phenylketonuria (PKU) (H24)    Von Willebrand disease (H)    Maternal phenylketonuria in third trimester (H24)    Intramural leiomyoma of uterus    Diastasis recti    Bilateral low back pain without sciatica     Laila Cueva, a 38-year-old female with a primary diagnosis of phenylketonuria (PKU) came today for follow-up evaluation. She is taking Kuvan, 1300 mg every day and she is also on low protein diet. She stopped taking PKU formula for more than a year. Since the last visit on 2022, there have been no hospitalizations, emergency  department visits, or other major changes in medical care.     FAMILY HISTORY: A brief family medical history was reviewed.  MEDICATIONS:   Current Outpatient Medications   Medication Sig Dispense Refill    sapropterin dihydrochloride (KUVAN) 100 MG tablet Take 15 tablets (1,500 mg) by mouth daily with food 1350 tablet 3    albuterol (PROAIR HFA) 108 (90 Base) MCG/ACT inhaler Inhale 2 puffs into the lungs every 6 hours for 30 days 18 g 0    calcium citrate (CITRACAL) 950 (200 Ca) MG tablet Take 1 tablet by mouth 2 times daily (Patient not taking: Reported on 4/16/2024)      levonorgestrel (MIRENA) 20 MCG/24HR IUD 1 each by Intrauterine route once      loratadine (CLARITIN) 10 MG tablet Take 10 mg by mouth daily PRN       No current facility-administered medications for this visit.     REVIEW OF SYSTEMS: The review of systems negative for new eye, ear, heart, lung, liver, spleen, gastrointestinal, bone, muscle, integumentary, endocrinologic, brain or psychiatric issues except as noted above.  PHYSICAL EXAMINATION:  vITAL SIGNS/WEIGHT:   Wt Readings from Last 2 Encounters:   09/07/23 172 lb 12.8 oz (78.4 kg)   08/28/23 173 lb (78.5 kg)        PHYSICAL EXAMINATION:   General: The patient is oriented to person, place and time at an age-appropriate manner.   HEENT: The facial features are normal and symmetric. The ears are of normal position and configuration and hearing is grossly normal.  Neck: The neck appears to have full range of motion  Chest: Does not appear to be tachypneic or in any respiratory distress.  Heart:  Laila Cueva  appears well perfused.  Abdomen: Not examined.  Extremities: The extremities are of normal configuration without visible contractures.  Back: Not examined.   Integument: The visible part of the integument is of normal appearance without significant changes in pigmentation, birthmarks, or lesions.  Neuromuscular:  Mental Status Exam: Alert, awake. Fully oriented. No dysarthria, no  "dysphasia. Speech of normal fluency.  Appears to have normal strength and full range of motion of the extremities.      LABORATORY RESULTS: Previous studies showed pathologically elevated blood phenylalanine levels as well as specific PAH gene mutations and excluded disorders of biopterin recycling. Laboratory studies from the past year were reviewed.    Date Phe (mg/dL)  Ref. range: 0.5 - 1.6 Tyrosine (mg/dL)  Ref. range: 0.6 - 2.4   05/10/2023 5.0 2.0   04/07/2021 7.9 1.2   07/27/2020 2.8 0.9   08/01/2019 2.0 0.7     ASSESSMENT:  Phenylketonuria disease (PKU)  On phenylalanine restricted diet  Not on PKU formula  On Kuvan 1300 mg daily  5.   According to her current weight Kuvan dose needs to be adjusted  6.   Elevated phenylalanine (>15 mgldL) Moderate phenylalanine (6-15 mg/dL) Excellent phenylalanine (<6 mg/dL) blood levels, aiming at optimal levels of 6 mg/dL or lower  7.   Good response to the current treatment    PLAN/RECOMMENDATIONS:  Continue low phenylalanine diet  Continue sapropterin dihydrochloride (Kuvan/generic) at a dose of 1500 mg daily  Send fasting blood \"tyrosine and phenylalanine\" levels monthly  Metabolic PKU Dietician consult today  Clinical Pharmacotherapy consultation with Kalpana Chopra PharmD  Good response to the current treatment  Return to PKU Clinic in 12 months or as needed    FOLLOW-UP PLAN:  If you are returning to clinic to review specific laboratory tests, please call the Genetic Counselor (see phone numbers below) to confirm that we have received all of the results from reference laboratories prior to your appointment. If we have not received all of the test results, please discuss re-scheduling your appointment.      With warmest regards,     Quan Monroy PhD MD  Medical Director, PKU Clinic  Professor of Pediatrics, Medical School, and  Experimental and Clinical Pharmacology, [employer]    Monday mornings: [facility] for Lysosomal Diseases " Clinic  Tuesday mornings : [facility] PKU clinic    Pharmacotherapy Consultant:  Kalpana Chopra, PharmD, Pharmacotherapy for Metabolic Disorders (PIMD): [phone]    Genetic Counselor:  Julianna Arroyo MS, [facility] (Genetic test Results): [phone]    Metabolic Dietician:  Gay Lopez, Registered Dietician: [phone]    [facility] Clinic Scheduler:  Ana Maria Chau, [phone]    Nurse Coordinator, PKU, Metabolism and Genetics:  Janey Weeks, RNCC, [phone]    Research Associate  JESSY Eli, CCRP, [phone]    Copies:    Laila Cueva  [address] [facility] [address]

## 2024-04-16 NOTE — LETTER
4/16/2024      RE: Laila Cueva  [address] [address]     Dear Colleague,    Thank you for the opportunity to participate in the care of your patient, Laila Cueva, at the [facility] at [facility]. Please see a copy of my visit note below.    ..CLINICAL NUTRITION SERVICES - ASSESSMENT NOTE    REASON FOR ASSESSMENT  Laila Cueva is a 38 year old female seen by the dietitian in metabolic clinic for PKU treated with Kuvan and diet therapy.    RECOMMENDATIONS    Send recent blood PHE level  Trial formula samples for possible initiation  Aim for 30-45 gm protein/day (goal per past notes)    To schedule future appointment call [phone].      ANTHROPOMETRICS  Height: 157.5 cm or 62 in  Weight: 78.4 kg or 172 lbs  BMI: 31.5  IBW: 50 kg  ABW: 57 kg    NUTRITION HISTORY  Laila is on a low protein diet (goal per past notes, 30-45 gm/day).    Patient notes that she wants to work on her diet, as this has not been a priority recently and feels her blood levels could possibly use improvement.      Typical oral intakes:  Breakfast:   Toast (2-4 gm pro)  Leftovers from dinner the night before: rice w/sweet and sour chicken, or chicken strips and French fries (~15-20 gm)    Lunch:   Often a salad kit (yesterday was maple bourbon salad kit) (~10 gm pro)    Snacks:  Oreos, chocolate, candy, etc    Dinner: (10-20 gm pro)  Last night: rice with peas, carrots, corn, and canned chicken  Hot dog + mac'n cheese  Pasta  Potato soup    Food frequency:  Patient does not drink milk or generally eat beans/legumes.  She has not been watching her protein intake as diligently in the past being more liberal with meats.    Special considerations:  Nutrition related medical updates:   Patient expresses interest in losing weight   Special diet:   Low protein/PKU  Vitamins/Supplements:   None    Home Regimen: PKU formula  Patient has  not historically taken any in many years.  She recalls trying samples several years ago, thinks she like Phenylade Ultra the best, but is willing to re-try some and see what she thinks.    NUTRITION RELATED PHYSICAL FINDINGS  None    NUTRITION RELATED LABS  Labs reviewed    PHE TYR  May 2023 5 2  April 2021 7.9 1.2    NUTRITION RELATED MEDICATIONS  Medications reviewed  -Kuvan daily    ESTIMATED NUTRITION NEEDS:  Based on Grayson-St. Jeor (1420) x 1.2-1.3  Energy Needs: 22-24 kcal/kg  Protein Needs: RDA = 0.8 g/kg using ABW  Micronutrient Needs: RDA for age     NUTRITION DIAGNOSIS  Impaired nutrient utilization related to diagnosis of PKU as evidenced by elevated blood PHE levels.    INTERVENTIONS  Nutrition Prescription  Laila to meet 100% estimated needs through low protein diet.    Nutrition Education:   Provided education on ongoing plan of nutritional care.    Discussed obtaining recent/current blood PHE level to see where current control on Kuvan is.  This will help determine if current intake is exceeding in protein goals.  We reviewed the role of formula intake (providing more sufficient nutrition if needing to reduce protein intake), as well as how it can be beneficial in weight loss for meal replacement option and role in satiation/preventing hunger.  Requested several formula options for patient to try, as well as referred to [facility] for benefit inquiry of coverage of formula.  Discussed some basics of weight loss: healthy snack options, portion control, and increasing activity levels.    Implementation:  Implementation: Collaboration with other providers - seen with PKU clinic team.    Goals  BMI reduction  Laila will follow a low protien diet  3.   PHE levels 2-6 mg/dL    FOLLOW UP/MONITORING  Food and Beverage intake  Macronutrient intake  Anthropometric measurements     Spent 15 minutes in consult with Laila BRIAN Lopez, MARIAELENA, LD      Please do not hesitate to contact me if you have any  questions/concerns.     Sincerely,       Gay Lopez, MARIAELENA

## 2024-04-16 NOTE — PROGRESS NOTES
Therapy: Glytactin  Insurance: [employer] [place]   Ded: $900  Met: $0    Co-Insurance: 80/20  Max Out of Pocket: $3900  Met: $37    In reference to [facility] for admission date 04/16/2024 to check for Glytactin coverage.    Please contact Intake with any questions, [phone] or In Basket pool, [facility] ([phone]).

## 2024-04-16 NOTE — LETTER
2024      RE: Lail[facility] Cueva  [address] [facility] [address]     Dear Colleague,    Thank you for the opportunity to participate in the care of your patient, Laila Cueva, at the [facility] at [facility]. Please see a copy of my visit note below.                Phenylketonuria (PKU) Clinic  [facility] [address] [place] [address]  Phone: [phone]  Fax: [phone]  Date: 2024      Patient:  Laila Cueva   :   1985   MRN:     [id number]      [facility]  [address] [facility] [address]    Dear Laila Cueva,    CHIEF COMPLAINT:     I had the pleasure of seeing Laila Cueva in the PKU and Maternal PKU Clinic at the [facility] regarding phenylketonuria (PKU). Patient is here for evaluation and treatment.      PAST MEDICAL HISTORY:    These list of past medical problems includes:    Patient Active Problem List   Diagnosis    Acne    CARDIOVASCULAR SCREENING; LDL GOAL LESS THAN 160    Dermatofibroma of left lower leg    Phenylketonuria (PKU) (H24)    Von Willebrand disease (H)    Maternal phenylketonuria in third trimester (H24)    Intramural leiomyoma of uterus    Diastasis recti    Bilateral low back pain without sciatica     Laila Cueva, a 38-year-old female with a primary diagnosis of phenylketonuria (PKU) came today for follow-up evaluation. She is taking Kuvan, 1300 mg every day and she is also on low protein diet. She stopped taking PKU formula for more than a year. Since the last visit on 2022, there have been no hospitalizations, emergency department visits, or other major changes in medical care.     FAMILY HISTORY: A brief family medical history was reviewed.  MEDICATIONS:   Current Outpatient Medications   Medication Sig Dispense Refill    sapropterin dihydrochloride (KUVAN) 100 MG tablet Take 15 tablets (1,500 mg) by mouth  daily with food 1350 tablet 3    albuterol (PROAIR HFA) 108 (90 Base) MCG/ACT inhaler Inhale 2 puffs into the lungs every 6 hours for 30 days 18 g 0    calcium citrate (CITRACAL) 950 (200 Ca) MG tablet Take 1 tablet by mouth 2 times daily (Patient not taking: Reported on 4/16/2024)      levonorgestrel (MIRENA) 20 MCG/24HR IUD 1 each by Intrauterine route once      loratadine (CLARITIN) 10 MG tablet Take 10 mg by mouth daily PRN       No current facility-administered medications for this visit.     REVIEW OF SYSTEMS: The review of systems negative for new eye, ear, heart, lung, liver, spleen, gastrointestinal, bone, muscle, integumentary, endocrinologic, brain or psychiatric issues except as noted above.  PHYSICAL EXAMINATION:  vITAL SIGNS/WEIGHT:   Wt Readings from Last 2 Encounters:   09/07/23 172 lb 12.8 oz (78.4 kg)   08/28/23 173 lb (78.5 kg)        PHYSICAL EXAMINATION:   General: The patient is oriented to person, place and time at an age-appropriate manner.   HEENT: The facial features are normal and symmetric. The ears are of normal position and configuration and hearing is grossly normal.  Neck: The neck appears to have full range of motion  Chest: Does not appear to be tachypneic or in any respiratory distress.  Heart:  Laila Cueva  appears well perfused.  Abdomen: Not examined.  Extremities: The extremities are of normal configuration without visible contractures.  Back: Not examined.   Integument: The visible part of the integument is of normal appearance without significant changes in pigmentation, birthmarks, or lesions.  Neuromuscular:  Mental Status Exam: Alert, awake. Fully oriented. No dysarthria, no dysphasia. Speech of normal fluency.  Appears to have normal strength and full range of motion of the extremities.      LABORATORY RESULTS: Previous studies showed pathologically elevated blood phenylalanine levels as well as specific PAH gene mutations and excluded disorders of biopterin  "recycling. Laboratory studies from the past year were reviewed.    Date Phe (mg/dL)  Ref. range: 0.5 - 1.6 Tyrosine (mg/dL)  Ref. range: 0.6 - 2.4   05/10/2023 5.0 2.0   04/07/2021 7.9 1.2   07/27/2020 2.8 0.9   08/01/2019 2.0 0.7     ASSESSMENT:  Phenylketonuria disease (PKU)  On phenylalanine restricted diet  Not on PKU formula  On Kuvan 1300 mg daily  5.   According to her current weight Kuvan dose needs to be adjusted  6.   Elevated phenylalanine (>15 mgldL) Moderate phenylalanine (6-15 mg/dL) Excellent phenylalanine (<6 mg/dL) blood levels, aiming at optimal levels of 6 mg/dL or lower  7.   Good response to the current treatment    PLAN/RECOMMENDATIONS:  Continue low phenylalanine diet  Continue sapropterin dihydrochloride (Kuvan/generic) at a dose of 1500 mg daily  Send fasting blood \"tyrosine and phenylalanine\" levels monthly  Metabolic PKU Dietician consult today  Clinical Pharmacotherapy consultation with Kalpana Chopra PharmD  Good response to the current treatment  Return to PKU Clinic in 12 months or as needed    FOLLOW-UP PLAN:  If you are returning to clinic to review specific laboratory tests, please call the Genetic Counselor (see phone numbers below) to confirm that we have received all of the results from reference laboratories prior to your appointment. If we have not received all of the test results, please discuss re-scheduling your appointment.      With warmest regards,     Quan Monroy PhD, MD  Medical Director, PKU Clinic  Professor of Pediatrics, Medical School, and  Experimental and Clinical Pharmacology, [employer]    Monday mornings: [facility] for Lysosomal Diseases Clinic  Tuesday mornings : [facility] PKU clinic    Pharmacotherapy Consultant:  Kalpana Chopra, PharmD, Pharmacotherapy for Metabolic Disorders (PIMD): [phone]    Genetic Counselor:  Julianna Arroyo MS, [facility] (Genetic test Results): [phone]    Metabolic Dietician:  Gay Lopez, " Registered Dietician: [phone]    [facility] Clinic Scheduler:  Ana Maria Chau, [phone]    Nurse Coordinator, PKU, Metabolism and Genetics:  Janey Weeks, PARISCC, [phone]    Research Associate  JESSY Eli, CCRP, [phone]    Copies:  Laila Cueva  [address] [facility] [address]

## 2024-04-17 ENCOUNTER — LAB (OUTPATIENT)
Dept: LAB | Facility: CLINIC | Age: 39
End: 2024-04-17
Payer: COMMERCIAL

## 2024-04-17 DIAGNOSIS — E70.1 PHENYLKETONURIA (PKU) (H): ICD-10-CM

## 2024-04-18 ENCOUNTER — PHARMACY VISIT (OUTPATIENT)
Dept: ADMINISTRATIVE | Facility: CLINIC | Age: 39
End: 2024-04-18
Payer: COMMERCIAL

## 2024-04-18 NOTE — PROGRESS NOTES
PKU Follow-Up Assessment   Completed on 2024/04/18 15:07:48 [facility], by Terrie Vo        Activity Date 2024/04/18     Activity Medications    KUVAN        Care Details    What are the patient's goals for this therapy?   ? 5/30/2023: being consistent in taking sapropterin. 5/6/2022: Phe level within goal range; meeting with nutritionist in June to revamp diet for PKU and recently diagnosed high cholesterol.5/18/2021: Keep phe level within goal range      Is the patient meeting treatment goals?   ? Progressing towards goals      Please select the medication being used by the patient to treat their PKU disease:   ? Kuvan/sapropterin    Has the patient started Kuvan/sapropterin?   ? Yes    What is the start date for Kuvan?   ? 2015-07-11    How was the patient assessed for response to Kuvan/sapropterin?   ? START Program    What is the patient's weight (kg)?   ? 78.4    What is the patient's current Kuvan daily dose (mg)?   ? 1500    What is the patient's current daily Kuvan/sapropterin dose (mg/kg/day)?   ? 19.1    Is this dose appropriate?   ? Yes    What dosage forms of Kuvan/sapropterin does the patient use?   ? 100mg Oral Tablets         Please enter patient's most recent PDC: [QA Metric]   ? 0.76      How many doses have been missed in the past month?   ? a few here and there      Based on the patient's report or refill record over the last 6-12 months, does the patient appear to be taking less than 80% of their medication? [QA Metric]   ? Yes      When was the patient's most recent blood phenylalanine checked?   ? 2023-05-10      What was the patient's most recent blood [Phe] level (mg/dL)? [QA Metric]   ? 5      Is the patient's [Phe] level at goal? (Goal of 2-6mg/dL)   ? Yes          Summary Notes   I had the pleasure of speaking to pt for TM.   No side effects. Doses: States life gets busy and she may miss a dose here and there. States she still uses a pill container. I asked if she has started using a phone  alarm as well, but she has not. She stores it where she will see it daily and take it.   No health, allergy or medication changes. No questions or concerns.   - Had dose increase d/t recent OV and weight increase. She had no questions or concerns regarding this.   - Will continue quarterly TM.      Tracey CORNELIUS, PharmD, CSP  Therapy Management Pharmacist  [address] [address]   [email or web address]  [email or web address]   Specialty: [phone]  Mail Order: [phone]

## 2024-04-25 NOTE — PROGRESS NOTES
..CLINICAL NUTRITION SERVICES - ASSESSMENT NOTE    REASON FOR ASSESSMENT  Laila Cueva is a 38 year old female seen by the dietitian in metabolic clinic for PKU treated with Kuvan and diet therapy.    RECOMMENDATIONS    Send recent blood PHE level  Trial formula samples for possible initiation  Aim for 30-45 gm protein/day (goal per past notes)    To schedule future appointment call [phone].      ANTHROPOMETRICS  Height: 157.5 cm or 62 in  Weight: 78.4 kg or 172 lbs  BMI: 31.5  IBW: 50 kg  ABW: 57 kg    NUTRITION HISTORY  Laila is on a low protein diet (goal per past notes, 30-45 gm/day).    Patient notes that she wants to work on her diet, as this has not been a priority recently and feels her blood levels could possibly use improvement.      Typical oral intakes:  Breakfast:   Toast (2-4 gm pro)  Leftovers from dinner the night before: rice w/sweet and sour chicken, or chicken strips and French fries (~15-20 gm)    Lunch:   Often a salad kit (yesterday was maple bourbon salad kit) (~10 gm pro)    Snacks:  Oreos, chocolate, candy, etc    Dinner: (10-20 gm pro)  Last night: rice with peas, carrots, corn, and canned chicken  Hot dog + mac'n cheese  Pasta  Potato soup    Food frequency:  Patient does not drink milk or generally eat beans/legumes.  She has not been watching her protein intake as diligently in the past being more liberal with meats.    Special considerations:  Nutrition related medical updates:   Patient expresses interest in losing weight   Special diet:   Low protein/PKU  Vitamins/Supplements:   None    Home Regimen: PKU formula  Patient has not historically taken any in many years.  She recalls trying samples several years ago, thinks she like Phenylade Ultra the best, but is willing to re-try some and see what she thinks.    NUTRITION RELATED PHYSICAL FINDINGS  None    NUTRITION RELATED LABS  Labs reviewed    PHE TYR  May 2023 5 2  April 2021 7.9 1.2    NUTRITION RELATED  MEDICATIONS  Medications reviewed  -Kuvan daily    ESTIMATED NUTRITION NEEDS:  Based on [place] (1420) x 1.2-1.3  Energy Needs: 22-24 kcal/kg  Protein Needs: RDA = 0.8 g/kg using ABW  Micronutrient Needs: RDA for age     NUTRITION DIAGNOSIS  Impaired nutrient utilization related to diagnosis of PKU as evidenced by elevated blood PHE levels.    INTERVENTIONS  Nutrition Prescription  Laila to meet 100% estimated needs through low protein diet.    Nutrition Education:   Provided education on ongoing plan of nutritional care.    Discussed obtaining recent/current blood PHE level to see where current control on Kuvan is.  This will help determine if current intake is exceeding in protein goals.  We reviewed the role of formula intake (providing more sufficient nutrition if needing to reduce protein intake), as well as how it can be beneficial in weight loss for meal replacement option and role in satiation/preventing hunger.  Requested several formula options for patient to try, as well as referred to [facility] for benefit inquiry of coverage of formula.  Discussed some basics of weight loss: healthy snack options, portion control, and increasing activity levels.    Implementation:  Implementation: Collaboration with other providers - seen with PKU clinic team.    Goals  BMI reduction  Laila will follow a low protien diet  3.   PHE levels 2-6 mg/dL    FOLLOW UP/MONITORING  Food and Beverage intake  Macronutrient intake  Anthropometric measurements     Spent 15 minutes in consult with Laila Lopez, MARIAELENA, LD

## 2024-04-26 LAB
PHE SERPL-MCNC: 4.8 MG/DL (ref 0.5–1.6)
PHE SERPL-SCNC: 29.1 UMOL/DL (ref 3–10)
TYROSINE SERPL-MCNC: 1.6 MG/DL (ref 0.6–2.4)
TYROSINE SERPL-SCNC: 8.8 UMOL/DL (ref 4–13)

## 2024-05-06 ENCOUNTER — TELEPHONE (OUTPATIENT)
Dept: CONSULT | Facility: CLINIC | Age: 39
End: 2024-05-06
Payer: COMMERCIAL

## 2024-05-06 NOTE — TELEPHONE ENCOUNTER
PA Initiation    Medication: SAPROPTERIN DIHYDROCHLORIDE 100 MG PO TABS  Insurance Company: [employer] [place] - Phone [phone] Fax [phone]  Pharmacy Filling the Rx: [facility] - [place], [place] - [address]  Filling Pharmacy Phone:    Filling Pharmacy Fax:    Start Date: 5/6/2024    [id number]

## 2024-05-08 NOTE — TELEPHONE ENCOUNTER
Prior Authorization Approval    Medication: SAPROPTERIN DIHYDROCHLORIDE 100 MG PO TABS  Authorization Effective Date: 4/7/2024  Authorization Expiration Date: 5/7/2025  Approved Dose/Quantity: #450 per 30 days  Reference #: [id number]   Insurance Company: [employer] [place] - Phone [phone] Fax [phone]  Expected CoPay: $ 0  CoPay Card Available:    Already has  Financial Assistance Needed: Already has  Which Pharmacy is filling the prescription: [facility] - [place], [place] - [address]  Pharmacy Notified: No, renewal  Patient Notified: No, renewal

## 2024-06-15 ENCOUNTER — HEALTH MAINTENANCE LETTER (OUTPATIENT)
Age: 39
End: 2024-06-15

## 2024-07-17 ENCOUNTER — PHARMACY VISIT (OUTPATIENT)
Dept: ADMINISTRATIVE | Facility: CLINIC | Age: 39
End: 2024-07-17
Payer: COMMERCIAL

## 2024-07-17 NOTE — PROGRESS NOTES
PKU Follow-Up Assessment   Completed on 2024/07/17 19:46:41 [facility], by Tracey Tavo      Activity Date 2024/07/17     Activity Medications    KUVAN        Care Details    What are the patient's goals for this therapy?   ? 7/17/2024: pt did not respond 5/30/2023: being consistent in taking sapropterin. 5/6/2022: Phe level within goal range; meeting with nutritionist in June to revamp diet for PKU and recently diagnosed high cholesterol.5/18/2021: Keep phe level within goal range      Is the patient meeting treatment goals?   ? Progressing towards goals      Please select the medication being used by the patient to treat their PKU disease:   ? Kuvan/sapropterin    Has the patient started Kuvan/sapropterin?   ? Yes    What is the start date for Kuvan?   ? 2015-07-11    How was the patient assessed for response to Kuvan/sapropterin?   ? START Program    What is the patient's weight (kg)?   ? 78    What is the patient's current Kuvan daily dose (mg)?   ? 1500    What is the patient's current daily Kuvan/sapropterin dose (mg/kg/day)?   ? 19.2    Is this dose appropriate?   ? Yes    What dosage forms of Kuvan/sapropterin does the patient use?   ? 100mg Oral Tablets         Please enter patient's most recent PDC: [QA Metric]   ? 0.75      How many doses have been missed in the past month?   ? none      Based on the patient's report or refill record over the last 6-12 months, does the patient appear to be taking less than 80% of their medication? [QA Metric]   ? Yes      When was the patient's most recent blood phenylalanine checked?   ? 2024-04-17      What was the patient's most recent blood [Phe] level (mg/dL)? [QA Metric]   ? 4.8      Is the patient's [Phe] level at goal? (Goal of 2-6mg/dL)   ? Yes          Summary Notes   I had the pleasure of speaking to pt for TM.   Sapropterin: No side effects. Doses: States she remembers to take her medications by keeping them where she will see it daily and take it.   No health,  allergy or medication changes. No questions or concerns.   Wt=Pt did not directly respond to weight question, but stated no changes.   - Will continue quarterly TM.       Tracey CORNELIUS, PharmD, CSP  Therapy Management Pharmacist  [address] [address]   [email or web address]  [email or web address]   Specialty: [phone]  Mail Order: [phone]

## 2024-08-22 ENCOUNTER — OFFICE VISIT (OUTPATIENT)
Dept: URGENT CARE | Facility: URGENT CARE | Age: 39
End: 2024-08-22
Payer: COMMERCIAL

## 2024-08-22 VITALS
BODY MASS INDEX: 31.2 KG/M2 | DIASTOLIC BLOOD PRESSURE: 64 MMHG | HEART RATE: 64 BPM | SYSTOLIC BLOOD PRESSURE: 100 MMHG | RESPIRATION RATE: 18 BRPM | OXYGEN SATURATION: 99 % | TEMPERATURE: 98.1 F | WEIGHT: 170.6 LBS

## 2024-08-22 DIAGNOSIS — R07.0 THROAT PAIN: Primary | ICD-10-CM

## 2024-08-22 DIAGNOSIS — Z20.818 EXPOSURE TO STREP THROAT: ICD-10-CM

## 2024-08-22 LAB — DEPRECATED S PYO AG THROAT QL EIA: NEGATIVE

## 2024-08-22 PROCEDURE — 87651 STREP A DNA AMP PROBE: CPT | Performed by: FAMILY MEDICINE

## 2024-08-22 PROCEDURE — 99213 OFFICE O/P EST LOW 20 MIN: CPT | Performed by: FAMILY MEDICINE

## 2024-08-22 NOTE — PROGRESS NOTES
Chief Complaint   Patient presents with    Urgent Care     Minor symptoms, throat a little red. Not necessarily sore, but low grade fever.     Laila was seen today for urgent care.    Diagnoses and all orders for this visit:    Throat pain  -     Streptococcus A Rapid Screen w/Reflex to PCR - Clinic Collect  -     Group A Streptococcus PCR Throat Swab    Exposure to strep throat      PLAN:   See orders in epic.   Symptomatic treat with gargles, lozenges, and OTC analgesic as needed. Follow-up with primary clinic if not improving.  Reviewed results   Results for orders placed or performed in visit on 08/22/24   Streptococcus A Rapid Screen w/Reflex to PCR - Clinic Collect     Status: Normal    Specimen: Throat; Swab   Result Value Ref Range    Group A Strep antigen Negative Negative       SUBJECTIVE:  Laila Cueva is a 39 year old female with a chief complaint of sore throat and low grade fever  Onset of symptoms was 2 day(s) ago.  Course of illness: sudden onset.  Severity mild  Current and Associated symptoms: sore throat  Treatment measures tried include Tylenol/Ibuprofen.  Predisposing factors include exposure to strep.    Past Medical History:   Diagnosis Date    Atypical PKU (H24)     since birth, manage with diet     Raynaud phenomenon     affects fingers and toes    Von Willebrand disease (H)     affects mainly menses    Von Willebrand disease (H)      Current Outpatient Medications   Medication Sig Dispense Refill    albuterol (PROAIR HFA) 108 (90 Base) MCG/ACT inhaler Inhale 2 puffs into the lungs every 6 hours for 30 days 18 g 0    calcium citrate (CITRACAL) 950 (200 Ca) MG tablet Take 1 tablet by mouth 2 times daily (Patient not taking: Reported on 4/16/2024)      levonorgestrel (MIRENA) 20 MCG/24HR IUD 1 each by Intrauterine route once      loratadine (CLARITIN) 10 MG tablet Take 10 mg by mouth daily PRN      sapropterin dihydrochloride (KUVAN) 100 MG tablet Take 15 tablets (1,500 mg) by mouth daily  with food 1350 tablet 3     Social History     Tobacco Use    Smoking status: Never    Smokeless tobacco: Never   Substance Use Topics    Alcohol use: No     Alcohol/week: 1.0 - 3.0 standard drink of alcohol     Types: 1 - 3 Standard drinks or equivalent per week       ROS:  Review of systems negative except as stated above.    OBJECTIVE:   /64   Pulse 64   Temp 98.1  F (36.7  C) (Tympanic)   Resp 18   Wt 77.4 kg (170 lb 9.6 oz)   SpO2 99%   Breastfeeding No   BMI 31.20 kg/m    GENERAL APPEARANCE: healthy, alert and no distress  EYES: EOMI,  PERRL, conjunctiva clear  HENT: ear canals and TM's normal.  Nose normal.  Pharynx erythematous with no exudate noted.  NECK: supple, non-tender to palpation, no adenopathy noted  RESP: lungs clear to auscultation - no rales, rhonchi or wheezes  CV: regular rates and rhythm, normal S1 S2, no murmur noted  PSYCH: mentation appears normal    Betzy Pedroza MD

## 2024-08-23 LAB — GROUP A STREP BY PCR: NOT DETECTED

## 2024-10-02 ENCOUNTER — PHARMACY VISIT (OUTPATIENT)
Dept: ADMINISTRATIVE | Facility: CLINIC | Age: 39
End: 2024-10-02
Payer: COMMERCIAL

## 2024-10-02 NOTE — PROGRESS NOTES
PKU Follow-Up Assessment   Completed on 2024/10/02 15:04:08 [facility], by Tracey Tavo        Activity Date 2024/10/02     Activity Medications    KUVAN        Care Details    What are the patient's goals for this therapy?   ? 7/17/2024: pt did not respond 5/30/2023: being consistent in taking sapropterin. 5/6/2022: Phe level within goal range; meeting with nutritionist in June to revamp diet for PKU and recently diagnosed high cholesterol.5/18/2021: Keep phe level within goal range      Is the patient meeting treatment goals?   ? Progressing towards goals      Please select the medication being used by the patient to treat their PKU disease:   ? Kuvan/sapropterin    Has the patient started Kuvan/sapropterin?   ? Yes    What is the start date for Kuvan?   ? 2015-07-11    How was the patient assessed for response to Kuvan/sapropterin?   ? START Program    What is the patient's weight (kg)?   ? 74.8    What is the patient's current Kuvan daily dose (mg)?   ? 1500    What is the patient's current daily Kuvan/sapropterin dose (mg/kg/day)?   ? 20    Is this dose appropriate?   ? Yes    What dosage forms of Kuvan/sapropterin does the patient use?   ? 100mg Oral Tablets         Please enter patient's most recent PDC: [QA Metric]   ? 0.75      How many doses have been missed in the past month?   ? pt admits to missed doses, but not how many      Based on the patient's report or refill record over the last 6-12 months, does the patient appear to be taking less than 80% of their medication? [QA Metric]   ? Yes      When was the patient's most recent blood phenylalanine checked?   ? 2024-04-17      What was the patient's most recent blood [Phe] level (mg/dL)? [QA Metric]   ? 4.8      Is the patient's [Phe] level at goal? (Goal of 2-6mg/dL)   ? Yes          Summary Notes  I had the pleasure of speaking to pt for TM (5th attempt refill outreach).   Sapropterin: States it is going ok. No side effects.   Doses: Pt did note missing some  doses. Pt states that to remember their doses they keep it on the windowsill by kitchen sink where she will see it and take it. I discussed storing medication in a cool, dry location or it could potentially be less effective. We discussed if there were other places they could store it like by their keys, etc, where they will see it in their routine. Pt will think about it there is another location that would work for them. Pt notes they only store their open bottle on the windowsill, and the others are in a cupboard. Pt also notes their medicine case broke so they need to get a new one. Pt also saw a suggestion from someone else to have a glass sit out and if the glass is wet, they took their meds and if the glass is dry, they did not. Pt states that is working for them.   No health, allergy or med changes. Wt= 165 lb.   No recent phe level. Encouraged pt to send one in.   No questions or concerns.   Will f/u in a couple months for adherence.       Tracey CORNELIUS, PharmD, CSP  Therapy Management Pharmacist  [facility]   [address] [place] [id number]   [email or web address]  [email or web address]   Specialty: [phone]  Mail Order: [phone]

## 2025-01-17 ENCOUNTER — ANCILLARY PROCEDURE (OUTPATIENT)
Dept: ULTRASOUND IMAGING | Facility: CLINIC | Age: 40
End: 2025-01-17
Attending: PHYSICIAN ASSISTANT
Payer: COMMERCIAL

## 2025-01-17 DIAGNOSIS — E04.1 NONTOXIC SINGLE THYROID NODULE: ICD-10-CM

## 2025-01-17 PROCEDURE — 76536 US EXAM OF HEAD AND NECK: CPT

## 2025-04-08 ENCOUNTER — VIRTUAL VISIT (OUTPATIENT)
Dept: PEDIATRICS | Facility: CLINIC | Age: 40
End: 2025-04-08
Attending: PEDIATRICS
Payer: COMMERCIAL

## 2025-04-08 DIAGNOSIS — E70.1 PHENYLKETONURIA (PKU): Primary | ICD-10-CM

## 2025-04-08 RX ORDER — SAPROPTERIN DIHYDROCHLORIDE 100 MG/1
1500 TABLET ORAL
Qty: 1350 TABLET | Refills: 3 | Status: SHIPPED | OUTPATIENT
Start: 2025-04-08

## 2025-04-08 NOTE — PROGRESS NOTES
Laila is a 39 year old who is being evaluated via a billable video visit.    How would you like to obtain your AVS? MyChart  If the video visit is dropped, the invitation should be resent by: Send to e-mail at: [email or web address]   Will anyone else be joining your video visit? Yes. JESSY Restrepo. (Research Physician), Dr. Isabel Rios, PharmD. (Pharmacist), and Dr. Neena Stokes, PharmD. (Pharmacist)    {If patient encounters technical issues they should call [phone] :[id number]}    Video-Visit Details:    Type of service:  Video Visit    Video Start Time: 12:00 PM  Video End Time (time video stopped): 12:29 PM    Originating Location (pt. Location): Home    Distant Location (provider location):  Off-site    Mode of Communication:  Video Conference via [facility]             Phenylketonuria (PKU) Clinic  [facility] [address] [place] [address]  Phone: [phone]  Fax: [phone]  Date: 2025      Patient:  Laila Cueva   :   1985   MRN:     [id number]      Laila Cueva  [address] [facility] [address]    Dear  Physician No Ref-Primary and Parents of Laila Cueva,    CHIEF COMPLAINT:     I had the pleasure of seeing Laila Cueva in the PKU and Maternal PKU Clinic at the [facility] regarding phenylketonuria (PKU). Patient is here for evaluation and treatment.      PAST MEDICAL HISTORY:    These list of past medical problems includes:    Patient Active Problem List   Diagnosis    Acne    CARDIOVASCULAR SCREENING; LDL GOAL LESS THAN 160    Dermatofibroma of left lower leg    Phenylketonuria (PKU)    Von Willebrand disease (H)    Maternal phenylketonuria in third trimester    Intramural leiomyoma of uterus    Diastasis recti    Bilateral low back pain without sciatica     Laila Cueva, a 39-year-old female with a primary diagnosis of phenylketonuria (PKU) came today for follow-up evaluation. She is taking Kuvan, 1500 mg every day  and she is also on low protein diet. She stopped taking PKU formula since 2023. Since the last visit on 04/06/2024, there have been no hospitalizations, emergency department visits, or other major changes in medical care.     FAMILY HISTORY: A brief family medical history was reviewed.  MEDICATIONS:   Current Outpatient Medications   Medication Sig Dispense Refill    albuterol (PROAIR HFA) 108 (90 Base) MCG/ACT inhaler Inhale 2 puffs into the lungs every 6 hours for 30 days 18 g 0    ASHWAGANDHA PO Take 1 tablet by mouth daily.      levonorgestrel (MIRENA) 20 MCG/24HR IUD 1 each by Intrauterine route once      sapropterin dihydrochloride (KUVAN) 100 MG tablet Take 15 tablets (1,500 mg) by mouth daily with food 1350 tablet 3    calcium citrate (CITRACAL) 950 (200 Ca) MG tablet Take 1 tablet by mouth 2 times daily (Patient not taking: Reported on 4/8/2025)       No current facility-administered medications for this visit.     REVIEW OF SYSTEMS: The review of systems negative for new eye, ear, heart, lung, liver, spleen, gastrointestinal, bone, muscle, integumentary, endocrinologic, brain or psychiatric issues except as noted above.  PHYSICAL EXAMINATION:  vITAL SIGNS/WEIGHT:   Wt Readings from Last 2 Encounters:   08/22/24 170 lb 9.6 oz (77.4 kg)   09/07/23 172 lb 12.8 oz (78.4 kg)        PHYSICAL EXAMINATION:   General: The patient is oriented to person, place and time at an age-appropriate manner.   HEENT: The facial features are normal and symmetric. The ears are of normal position and configuration and hearing is grossly normal.  Neck: The neck appears to have full range of motion  Chest: Does not appear to be tachypneic or in any respiratory distress.  Heart:  Laila Cueva  appears well perfused.  Abdomen: Not examined.  Extremities: The extremities are of normal configuration without visible contractures.  Back: Not examined.   Integument: The visible part of the integument is of normal appearance without  "significant changes in pigmentation, birthmarks, or lesions.  Neuromuscular:  Mental Status Exam: Alert, awake. Fully oriented. No dysarthria, no dysphasia. Speech of normal fluency.  Appears to have normal strength and full range of motion of the extremities.      LABORATORY RESULTS: Previous studies showed pathologically elevated blood phenylalanine levels as well as specific PAH gene mutations and excluded disorders of biopterin recycling. Laboratory studies from the past year were reviewed.    Date Phe (mg/dL)  Ref. range: 0.5 - 1.6 Tyrosine (mg/dL)  Ref. range: 0.6 - 2.4   01/17/2025 4.1 1.4   04/17/2024 4.8 1.6   05/10/2023 5.0 2.0   04/07/2021 7.9 1.2     ASSESSMENT:  Phenylketonuria disease (PKU)  On phenylalanine restricted diet  Not on PKU formula  On Kuvan 1500 mg daily  Elevated phenylalanine (>15 mgldL) Moderate phenylalanine (6-15 mg/dL) Excellent phenylalanine (<6 mg/dL) blood levels, aiming at optimal levels of 6 mg/dL or lower  Good response to the current treatment    PLAN/RECOMMENDATIONS:  Continue low phenylalanine diet  Continue sapropterin dihydrochloride (Kuvan/generic)   Send fasting blood \"tyrosine and phenylalanine\" levels monthly  Metabolic PKU Dietician consult today  Clinical Pharmacotherapy consultation with Isabel Rios PharmD  Good response to the current treatment  Information provided about the currently available other therapy for PKU including experimental Gene Therapy Clinical Trial for PKU disease  Return to PKU Clinic in 12 months or as needed     {[facility] 2021 Documentation (Optional):808393}  {2021 E&M time (Optional):816628}  {Provider  Link to [facility] Help Grid :178353}     FOLLOW-UP PLAN:  If you are returning to clinic to review specific laboratory tests, please call the Genetic Counselor (see phone numbers below) to confirm that we have received all of the results from reference laboratories prior to your appointment. If we have not received all of the test results, please " discuss re-scheduling your appointment.      With warmest regards,     Quan Monroy PhD MD  Medical Director, PKU Clinic  Professor of Pediatrics, Medical School, and  Experimental and Clinical Pharmacology, [employer]    Monday mornings: [facility] for Lysosomal Diseases Clinic  Tuesday mornings : [facility] PKU clinic    Pharmacotherapy Consultant:  Kalpana hCopra, PharmD, Pharmacotherapy for Metabolic Disorders ([facility]IM[facility]): [phone]    Genetic Counselor:  Julianna Arroyo MS, [facility] (Genetic test Results): [phone]    Metabolic Dietician:  Gay Lopez, Registered Dietician: [phone]    [facility] Clinic Scheduler:  Ana Maria Chau, [phone]    Nurse Coordinator, PKU, Metabolism and Genetics:  Janey Weeks RNCC, [phone]    Research Associate  JESSY Eli, CCRP, [phone]    Copies:    Dr. Physician No Ref-Primary  No address on file    Laila Cueva  [address] [facility] [address]    Dr. Millan referring provider defined for this encounter.

## 2025-04-15 ENCOUNTER — PHARMACY VISIT (OUTPATIENT)
Dept: ADMINISTRATIVE | Facility: CLINIC | Age: 40
End: 2025-04-15
Payer: COMMERCIAL

## 2025-04-15 NOTE — PROGRESS NOTES
PKU Follow-Up Assessment   Completed on 2025/04/15 16:34:23 [facility], by Tracey Shah Medications    KUVAN        Care Details    What are the patient's goals for this therapy?   ? 7/17/2024: pt did not respond5/30/2023: being consistent in taking sapropterin. 5/6/2022: Phe level within goal range; meeting with nutritionist in June to revamp diet for PKU and recently diagnosed high cholesterol.5/18/2021: Keep phe level within goal range      Is the patient meeting treatment goals?   ? Progressing towards goals      Please select the medication being used by the patient to treat their PKU disease:   ? Kuvan/sapropterin    Has the patient started Kuvan/sapropterin?   ? Yes    What is the start date for Kuvan?   ? 2015-07-12    How was the patient assessed for response to Kuvan/sapropterin?   ? START Program    What is the patient's weight (kg)?   ? 77.4    What is the patient's current Kuvan daily dose (mg)?   ? 1500    What is the patient's current daily Kuvan/sapropterin dose (mg/kg/day)?   ? 19.3    Is this dose appropriate?   ? Yes    What dosage forms of Kuvan/sapropterin does the patient use?   ? 100mg Oral Tablets         Please enter patient's most recent PDC: [QA Metric]   ? 0.92      How many doses have been missed in the past month?   ? no issues      Based on the patient's report or refill record over the last 6-12 months, does the patient appear to be taking less than 80% of their medication? [QA Metric]   ? No      When was the patient's most recent blood phenylalanine checked?   ? 2025-01-17      What was the patient's most recent blood [Phe] level (mg/dL)? [QA Metric]   ? 4.1      Is the patient's [Phe] level at goal? (Goal of 2-6mg/dL)   ? Yes          Summary Notes  I had the pleasure of speaking to pt for TM.   Sapropterin 1500mg daily.   Assessment was brief as pt has OV last week with PKU clinic.   Side effects: None.   Doses: No issues.   Health, allergy or med changes: Pt states they  were taking supplements Ashwagandha, holy basil, mushroom blend in a tincture supplement from [employer] and [employer]. States they may go back on it as they felt it helped their sleep. (Per Peggy, no DDI with sapropterin and these supplements. Mirena: Moderate- be cautious with combination, level of evidence D; theoretically, taking ashwagandha with hepatotoxic drugs (Mirena) may incr risk of liver damage.) I discussed the potential DDI with Mirena and ashwagandha- if pt goes back on to get liver enzyme labs and went over s/s of liver damage.   Questions or concerns: None.   Follow up: Quarterly. Liaison will reach out sooner if LTF.       Tracey CORNELIUS, PharmD, CSP  Therapy Management Pharmacist  [facility]   [address] [place] [id number]   [email or web address]  [email or web address]   Specialty: [phone]  Mail Order: [phone]

## 2025-04-23 ENCOUNTER — TELEPHONE (OUTPATIENT)
Dept: CONSULT | Facility: CLINIC | Age: 40
End: 2025-04-23
Payer: COMMERCIAL

## 2025-04-23 NOTE — TELEPHONE ENCOUNTER
PA Initiation    Medication: SAPROPTERIN DIHYDROCHLORIDE 100 MG PO TABS  Insurance Company: [employer] [place] - Phone [phone] Fax [phone]  Pharmacy Filling the Rx: [facility] - [place], [place] - [address]  Filling Pharmacy Phone:    Filling Pharmacy Fax:    Start Date: 4/23/2025     [id number]

## 2025-04-24 NOTE — TELEPHONE ENCOUNTER
Prior Authorization Approval    Medication: SAPROPTERIN DIHYDROCHLORIDE 100 MG PO TABS  Authorization Effective Date: 3/25/2025  Authorization Expiration Date: 4/24/2026  Approved Dose/Quantity: all doses  Reference #: [id number]   Insurance Company: [employer] [place] - Phone [phone] Fax [phone]  Expected CoPay: $    CoPay Card Available:      Financial Assistance Needed:   Which Pharmacy is filling the prescription: [facility] - [place] [address]  Pharmacy Notified: renewal  Patient Notified: renewal

## 2025-07-06 ENCOUNTER — HEALTH MAINTENANCE LETTER (OUTPATIENT)
Age: 40
End: 2025-07-06

## (undated) DEVICE — SOL WATER IRRIG 1000ML BOTTLE 07139-09

## (undated) DEVICE — PACK C-SECTION LF PL15OTA83B

## (undated) DEVICE — ESU GROUND PAD UNIVERSAL W/O CORD

## (undated) DEVICE — BARRIER SEPRAFILM 5X6" SINGLE SHEET 4301-02

## (undated) DEVICE — SU VICRYL 0 CT-1 36" J346H

## (undated) DEVICE — SUCTION CANISTER MEDIVAC LINER 1500ML W/LID 65651-515

## (undated) DEVICE — CATH TRAY FOLEY 16FR SILICONE 907416

## (undated) DEVICE — PREP CHLORAPREP 26ML TINTED ORANGE  260815

## (undated) DEVICE — SOL NACL 0.9% IRRIG 1000ML BOTTLE 07138-09

## (undated) DEVICE — DRAPE IOBAN C-SECTION W/POUCH 30X35" 6657

## (undated) DEVICE — GLOVE ESTEEM POWDER FREE SMT 6.5  2D72PT65

## (undated) DEVICE — SU VICRYL 4-0 PS-2 18" UND J496G

## (undated) DEVICE — SU DERMABOND ADVANCED .7ML DNX12

## (undated) DEVICE — BASIN SET MAJOR

## (undated) DEVICE — DRSG STERI STRIP 1/4X3" R1541

## (undated) DEVICE — BARRIER INTERCEED 5X6" 4350XL

## (undated) DEVICE — STRAP KNEE/BODY 31143004

## (undated) DEVICE — GLOVE PROTEXIS BLUE W/NEU-THERA 6.5  2D73EB65

## (undated) DEVICE — CATH TRAY FOLEY 16FR BARDEX W/DRAIN BAG STATLOCK 300316A

## (undated) DEVICE — SU MONOCRYL 0 CT-1 36" Y346H

## (undated) DEVICE — SOL ADH LIQUID BENZOIN SWAB 0.6ML C1544

## (undated) DEVICE — SU MONOCRYL 0 CTB-1 36" YB946

## (undated) DEVICE — GLOVE ESTEEM POWDER FREE SMT 6.0  2D72PT60

## (undated) DEVICE — STOCKING SLEEVE COMPRESSION CALF LG

## (undated) RX ORDER — FENTANYL CITRATE 50 UG/ML
INJECTION, SOLUTION INTRAMUSCULAR; INTRAVENOUS
Status: DISPENSED
Start: 2019-09-25

## (undated) RX ORDER — MORPHINE SULFATE 1 MG/ML
INJECTION, SOLUTION EPIDURAL; INTRATHECAL; INTRAVENOUS
Status: DISPENSED
Start: 2019-09-25

## (undated) RX ORDER — KETOROLAC TROMETHAMINE 30 MG/ML
INJECTION, SOLUTION INTRAMUSCULAR; INTRAVENOUS
Status: DISPENSED
Start: 2019-09-25